# Patient Record
Sex: FEMALE | Race: WHITE | Employment: UNEMPLOYED | URBAN - METROPOLITAN AREA
[De-identification: names, ages, dates, MRNs, and addresses within clinical notes are randomized per-mention and may not be internally consistent; named-entity substitution may affect disease eponyms.]

---

## 2021-05-22 ENCOUNTER — HOSPITAL ENCOUNTER (OUTPATIENT)
Age: 41
Discharge: HOME | End: 2021-05-22
Payer: OTHER GOVERNMENT

## 2021-05-22 ENCOUNTER — APPOINTMENT (OUTPATIENT)
Dept: GENERAL RADIOLOGY | Age: 41
End: 2021-05-22
Attending: NURSE PRACTITIONER
Payer: OTHER GOVERNMENT

## 2021-05-22 ENCOUNTER — APPOINTMENT (OUTPATIENT)
Dept: URGENT CARE | Age: 41
End: 2021-05-22

## 2021-05-22 VITALS
HEIGHT: 62 IN | SYSTOLIC BLOOD PRESSURE: 110 MMHG | OXYGEN SATURATION: 95 % | WEIGHT: 175 LBS | RESPIRATION RATE: 22 BRPM | TEMPERATURE: 98 F | HEART RATE: 98 BPM | BODY MASS INDEX: 32.2 KG/M2 | DIASTOLIC BLOOD PRESSURE: 76 MMHG

## 2021-05-22 DIAGNOSIS — U07.1 COVID-19 VIRUS INFECTION: Primary | ICD-10-CM

## 2021-05-22 DIAGNOSIS — R11.0 NAUSEA: ICD-10-CM

## 2021-05-22 PROCEDURE — 81025 URINE PREGNANCY TEST: CPT | Performed by: NURSE PRACTITIONER

## 2021-05-22 PROCEDURE — 99203 OFFICE O/P NEW LOW 30 MIN: CPT

## 2021-05-22 PROCEDURE — 99453 REM MNTR PHYSIOL PARAM SETUP: CPT

## 2021-05-22 PROCEDURE — 71046 X-RAY EXAM CHEST 2 VIEWS: CPT | Performed by: NURSE PRACTITIONER

## 2021-05-22 RX ORDER — ONDANSETRON 4 MG/1
4 TABLET, ORALLY DISINTEGRATING ORAL ONCE
Status: COMPLETED | OUTPATIENT
Start: 2021-05-22 | End: 2021-05-22

## 2021-05-22 RX ORDER — ACETAMINOPHEN 160 MG/5ML
650 SOLUTION ORAL ONCE
Status: COMPLETED | OUTPATIENT
Start: 2021-05-22 | End: 2021-05-22

## 2021-05-22 RX ORDER — ONDANSETRON 4 MG/1
4 TABLET, ORALLY DISINTEGRATING ORAL EVERY 4 HOURS PRN
Qty: 10 TABLET | Refills: 0 | Status: SHIPPED | OUTPATIENT
Start: 2021-05-22

## 2021-05-22 NOTE — ED INITIAL ASSESSMENT (HPI)
GRICELDA Diaz at the bedside assessing. Patient here with complaints of a cough and head cold x 7-10 days. She states in the last 5 days she has had nausea, intermittent fevers, and some SOB.  She has had vomiting in the morning and states it is bile,

## 2021-05-24 ENCOUNTER — TELEPHONE (OUTPATIENT)
Dept: FAMILY MEDICINE CLINIC | Facility: CLINIC | Age: 41
End: 2021-05-24

## 2021-05-24 NOTE — TELEPHONE ENCOUNTER
Attempted to call [facility] for condition update, unable to leave . Phone rings then a busy signal. Will try later.

## 2021-12-04 NOTE — ED PROVIDER NOTES
Patient Seen in: [facility] [place]      History   Patient presents with:  Cough/URI    Stated Complaint: VOMITTING/SOB X 1 WK    HPI/Subjective: This is a 29-year-old female with no significant past medical history.   Presents to immediate care Presents with complaints of productive cough, fever, chest wall pain and bodt aches ongoing for the last week.   bruise/bleed easily. Positive for stated complaint: VOMITTING/SOB X 1 WK  Other systems are as noted in HPI. Constitutional and vital signs reviewed. All other systems reviewed and negative except as noted above.     Physical Exam     ED Triage tenderness, left CVA tenderness, guarding or rebound. Hernia: No hernia is present. Musculoskeletal:      Cervical back: Neck supple. Skin:     General: Skin is warm and dry. Capillary Refill: Capillary refill takes less than 2 seconds. [id number]  [phone]  In 1 week        The patient has evidence of COVID-19 pneumonia and low oxygen saturation. There is concern for gradual decline at home.  As a result, a pulse oximetry device was given to the patient/caregiver and clear instruction

## 2024-01-09 NOTE — PATIENT INSTRUCTIONS
Schedule your mammogram.    Schedule your pelvic ultrasound for follow up on the ovarian cysts.    Make an appointment with the gynecologist, Dr. Crenshaw or one of her partners, for follow up on the ovarian cysts.    Use the Proctocort foam twice daily to the hemorrhoid until symptoms improve.    Drink 64 ounces of water daily, increase fiber in your diet, use a stool softener if you develop constipation.    Recommendations for exercise are 3-5 times weekly for 30-60 minutes for a minimum of 150-300 minutes.     For premenopausal women and men, 1000 mg of calcium daily is recommended. For postmenopausal women, 1200 mg of calcium daily is recommended.    To help the body absorb and use calcium, vitamin D 2000 international units daily is recommended.    I will contact you with your test results once available.

## 2024-01-09 NOTE — PROGRESS NOTES
The 21st Century Cures Act makes medical notes like these available to patients in the interest of transparency. Please be advised this is a medical document. Medical documents are intended to carry relevant information, facts as evident, and the clinical opinion of the practitioner. The medical note is intended as peer to peer communication and may appear blunt or direct. It is written in medical language and may contain abbreviations or verbiage that are unfamiliar.     Rubia Ladd is a 43 year old female who is here for   Chief Complaint   Patient presents with    Physical       HPI:     This 43-year-old female who is a new patient to my practice presents to the office for her annual wellness exam.    She has several concerns today.    She has had issues with a hemorrhoid for the last 4 years.  She states intermittently it is bleeding and irritated.  She uses Preparation H as needed.  She is denying any constipation, abdominal pain, nausea, vomiting or diarrhea. She has never had a colonoscopy. There is no family history of colon cancer.  She has a history of ovarian cysts. Years ago, she had one rupture which required surgical intervention because she had bleeding from the fallopian tube.  She still has both her ovaries.  She states that the last time she saw a doctor for this, she was about 25 years old.  She had an episode of pelvic pain 2 years ago which she thinks was another ruptured ovarian cyst but did not seek care.  There is no family history for ovarian cancer.  She has not had a pelvic ultrasound in over 20 years.  The patient has not seen a doctor in over 20 years.  She would like to be referred to a gynecologist.  The patient had history of exercise-induced asthma as a child.  The patient states she only needed to use an inhaler before gym or exercise.  This has not been a problem for her as an adult.  The patient is declining her Tdap, flu and COVID booster.  The patient has a form she  needs completed for her insurance for her wellness exam.    Exercise: occasional.  Diet:  trying to lose weight and watches somewhat  Sleep: 8 hours     Depression/Anxiety:   PHQ-2 SCORE: 0, done 2024   If you checked off any problems, how difficult have these problems made it for you to do your work, take care of things at home, or get along with other people?: Not difficult at all  Last [place] Suicide Screening on 2024 was No Risk.       Fall Risk: No falls last 3 months  Gun in home:yes            Gun safe:yes  Glasses/contacts:Glasses             Recent eye doctor visit:2 years ago   Hearing aids:No    Family History for:  Breast ca- No  Ovarian ca- No  Uterine ca- No  Colon ca- No        FDLMP 12/15/2023  monthly, no contraception  Last pap smear: 20 years ago  Last Mammogram: Never    Colonoscopy: Never      Past Medical History:   Diagnosis Date    Asthma     History of urinary reflux     Ruptured ovarian cyst        Past Surgical History:   Procedure Laterality Date    OTHER SURGICAL HISTORY      bladder surgery for reflux    OTHER SURGICAL HISTORY      rupture ovarian cyst with bleeding from fallopian tube-still has both ovaries       History reviewed. No pertinent family history.    Social History     Socioeconomic History    Marital status:    Tobacco Use    Smoking status: Never    Smokeless tobacco: Never   Vaping Use    Vaping Use: Never used   Substance and Sexual Activity    Alcohol use: Not Currently    Drug use: Not Currently    Sexual activity: Yes     Partners: Male   Other Topics Concern     Service No    Blood Transfusions No    Caffeine Concern Yes    Occupational Exposure No    Hobby Hazards No    Sleep Concern No    Stress Concern No    Weight Concern No    Special Diet No    Back Care No    Exercise Yes    Bike Helmet No    Seat Belt Yes    Self-Exams No       Works as a shift lead at a gas station    Medications:    No current outpatient medications on  file prior to visit.     No current facility-administered medications on file prior to visit.       Allergies:    Allergies   Allergen Reactions    Morphine NAUSEA AND VOMITING       REVIEW OF SYSTEMS:     See HPI for relevant ROS  GENERAL HEALTH: no other complaints  NEURO: no other complaints  VISION: no other complaints  RESPIRATORY: no other complaints  CARDIOVASCULAR: no other complaints  GI: no other complaints  : no other complaints  SKIN: no other complaints  PSYCH: no other complaints  EXT: no other complaints        EXAM:   /68   Pulse 110   Temp 97.3 °F (36.3 °C) (Temporal)   Resp 20   Ht 5' 2\" (1.575 m)   Wt 178 lb (80.7 kg)   SpO2 98%   BMI 32.56 kg/m²     Wt Readings from Last 3 Encounters:   01/09/24 178 lb (80.7 kg)   05/22/21 175 lb (79.4 kg)       BP Readings from Last 3 Encounters:   01/09/24 118/68   05/22/21 110/76        Visual Acuity  Right Eye Visual Acuity: Corrected Right Eye Chart Acuity: 20/30   Left Eye Visual Acuity: Corrected Left Eye Chart Acuity: 20/30   Both Eyes Visual Acuity: Corrected Both Eyes Chart Acuity: 20/30   Able To Tolerate Visual Acuity: Yes      General: WH/Obese/WD, in NAD, A and O times 3  HEAD: Normocephalic, atraumatic  EYES: MITESH, EOMI, conjunctiva normal, sclera anicteric.  EARS: Tympanic membranes normal, EAC's normal.  NOSE: Turbninates normal, no bleeding noted.  PHARYNX:  No eythema or exudates, mucous membrane moist, airway patent.  NECK:  No cervical lymphadenopathy or thyromegaly, no bruits noted.  HEART: Regular rate and rhythm, no S3, S4 or murmur noted.  LUNGS: Clear to ausculation. No retractions or tachypnea noted.  BREASTS: deferred-to see gyne  ABDOMEN: Soft, obese, nontender, no guarding, rigidity or rebound, no masses or hepatosplenomegaly, normal bowel sounds in all four quadrants.  : deferred-to see gyne  Anus: Large inflamed external hemorrhoid, no active bleeding, not thrombosed.  BACK: No tenderness over the thoracic or  lumbar spine. No scoliosis noted.  EXTREMITIES: No clubbing, cyanosis, edema noted. Motor strength +5/5 in all 4 extremities. DTR's +2/4 in all 4 extremities. Able to toe and heel walk.  SKIN: Warm and dry. Multiples tattoos noted.  NEURO: Cr. N. II-XII intact, normal gait  PSYCH: Patient is anxious about her hemorrhoid.      ASSESSMENT AND PLAN:     1. Wellness examination  -We discussed the following:  Healthy diet and exercise, cancer screening, self breast exams and calcium and vitamin D supplementation.    2. Laboratory exam ordered as part of routine general medical examination  3. Screening for diabetes mellitus    - Comp Metabolic Panel (14) [E]; Future  - CBC W Differential W Platelet [E]; Future  - TSH W Reflex To Free T4 [E]; Future  - Lipid Panel [E]; Future  - Hemoglobin A1C [E]; Future  - HCV Antibody [E]; Future  - Comp Metabolic Panel (14) [E]  - CBC W Differential W Platelet [E]  - TSH W Reflex To Free T4 [E]  - Lipid Panel [E]  - Hemoglobin A1C [E]  - HCV Antibody [E]      4. Encounter for screening mammogram for malignant neoplasm of breast    - [facility] 2D+3D SCREENING BILAT (CPT=77067/89886); Future    5. History of ovarian cyst    Will check pelvic US as patient last had pelvic pain 2 years ago when she thinks another cyst ruptured. I will refer the patient to Dr. Crenshaw for further evaluation. Patient prefers her pap be done by the gynecologist.    - OBG Referral - In Network  - US PELVIS (TRANSABDOMINAL AND TRANSVAGINAL) (CPT=76856/03238); Future    6. External hemorrhoid    I discussed treatment and prevention of hemorrhoids with the patient. Will Rx Proctocort. If increasing pain or rectal bleeding, patient informed she should contact the office and I will refer her to the rectal surgeon.    - Hydrocortisone Acetate 10 % External Foam; Place 1 applicator (90 mg total) rectally in the morning and 1 applicator (90 mg total) before bedtime.  Dispense: 15 g; Refill: 1    7. Class 1 obesity due  to excess calories without serious comorbidity with body mass index (BMI) of 32.0 to 32.9 in adult    Patient declined referral to dietician. Handouts about Mediterranean diet and weight loss given to the patient.    8. Immunization declined    Patient declined flu, Tdap and COVID vaccines. I encouraged the patient to consider vaccines for the future.         Health Maintenance:    Health Maintenance   Topic Date Due    Annual Physical  1/9/2025    Mammogram  Never done    DTaP,Tdap,and Td Vaccines (1 - Tdap) 1/9/2025    Pap Smear  Never done    Influenza Vaccine (1) 01/09/2025 (Originally 10/1/2023)    COVID-19 Vaccine (1) 01/09/2025 (Originally 9/29/1980)    Annual Depression Screening  Completed    Pneumococcal Vaccine: Birth to 64yrs  Aged Out         Orders This Visit:  Orders Placed This Encounter   Procedures    Comp Metabolic Panel (14) [E]    CBC W Differential W Platelet [E]    TSH W Reflex To Free T4 [E]    Lipid Panel [E]    Hemoglobin A1C [E]    HCV Antibody [E]       Meds This Visit:  Requested Prescriptions     Signed Prescriptions Disp Refills    Hydrocortisone Acetate 10 % External Foam 15 g 1     Sig: Place 1 applicator (90 mg total) rectally in the morning and 1 applicator (90 mg total) before bedtime.       Imaging & Referrals:  OBG - INTERNAL  [facility] 2D+3D SCREENING BILAT (CPT=77067/26678)  US PELVIS (TRANSABDOMINAL AND TRANSVAGINAL) (CPT=76856/38712)       The patient indicates understanding of these issues and agrees to the plan.  The patient is asked to return pending lab results.  Christina Loredo DO    Total time: 60 minutes  including precharting, H&P, plan of care, form completed for insurance.    This dictation was performed with a verbal recognition program (DRAGON) and it was checked for errors. It is possible that there are still dictated errors within this office note. If so, please bring any errors to my attention for an addendum. All efforts were made to ensure that this office  note is accurate

## 2024-03-07 NOTE — ED QUICK NOTES
Finger spint applied as ordered by Dr. Gillette, held in place with coban, cms intact, pt tolerating well

## 2024-03-07 NOTE — ED PROVIDER NOTES
Patient Seen in: [facility] [place]      History     Chief Complaint   Patient presents with    Arm or Hand Injury     Stated Complaint: lt pinky injury    Subjective:   HPI    43-year-old female who presents to the immediate care complaining of having injured her left fifth digit.  The patient says she was reaching for roll of toilet paper but it was going to fall into her toilet.  As she reached out to grab the falling object she banged the left fifth digit against an object.  She noticed that her distal phalanx has been held in a flexed position ever since the injury occurred.  She can no longer extend the distal phalanx.  No numbness or tingling.  No open wounds.  Reviewing her record she was last seen for an office visit for a wellness examination on 1/9/2024.    Objective:   Past Medical History:   Diagnosis Date    Asthma (HCC)     exercise induced as a child-no issues as an adult    History of urinary reflux     Ruptured ovarian cyst               Past Surgical History:   Procedure Laterality Date    OTHER SURGICAL HISTORY      bladder surgery for reflux    OTHER SURGICAL HISTORY      rupture ovarian cyst with bleeding from fallopian tube-still has both ovaries                Social History     Socioeconomic History    Marital status:    Tobacco Use    Smoking status: Never    Smokeless tobacco: Never   Vaping Use    Vaping Use: Never used   Substance and Sexual Activity    Alcohol use: Not Currently    Drug use: Not Currently    Sexual activity: Yes     Partners: Male   Other Topics Concern     Service No    Blood Transfusions No    Caffeine Concern Yes    Occupational Exposure No    Hobby Hazards No    Sleep Concern No    Stress Concern No    Weight Concern No    Special Diet No    Back Care No    Exercise Yes    Bike Helmet No    Seat Belt Yes    Self-Exams No              Review of Systems    Positive for stated complaint: lt pinky injury  Other systems are as noted in  HPI.  Constitutional and vital signs reviewed.      All other systems reviewed and negative except as noted above.    Physical Exam     ED Triage Vitals [03/07/24 1504]   BP (!) 173/105   Pulse 94   Resp 16   Temp 98.1 °F (36.7 °C)   Temp src Temporal   SpO2 99 %   O2 Device None (Room air)       Current:BP (!) 175/75   Pulse 89   Temp 98.1 °F (36.7 °C) (Temporal)   Resp 16   Ht 160 cm (5' 3\")   Wt 81.6 kg   LMP 03/01/2024   SpO2 99%   BMI 31.89 kg/m²         Physical Exam  General: Nontoxic 43-year-old female in no distress.  She is right-hand dominant.  Focused exam of the patient's left upper extremity: Left fifth digit shows that the distal phalanx is held in a flexed position.  She is unable to extend actively at the distal phalanx.  There is no open wounds.  There are some erythema along the middle phalanx and distal phalanx.  Can passively extend the distal phalanx without discomfort.  Capillary refills less than 2 seconds.  She is able to flex and extend fully at the MCP.       ED Course   Labs Reviewed - No data to display  PROCEDURE:  XR FINGER(S) (MIN 2 VIEWS), LEFT 5TH (CPT=73140)     INDICATIONS:  pain/injury     COMPARISON:  None.     TECHNIQUE:  Three views of the finger were obtained.     PATIENT STATED HISTORY: (As transcribed by Technologist)  Patient states she hit her finger on a hard surface this morning. She has pain in her distal fifth digit.         FINDINGS:  There is no fracture, subluxation or dislocation in the left 5th finger.  There is flexion noted at the DIP joint on the lateral view.  Correlate with physical exam.  There is a well corticated ossific density seen in the soft tissues lateral  to the distal epiphysis of the proximal phalanx of the middle finger.  This could reflect an old chip or avulsion fracture or ligamentous calcification.                   Impression  CONCLUSION:  No acute fracture in the left 5th digit.  Please see above.        LOCATION:  [facility]         Dictated by (CST): Kulwant Morejon MD on 3/07/2024 at 4:19 PM      Finalized by (CST): Kulwant Morejon MD on 3/07/2024 at 4:20 PM                   MDM    Differential diagnose includes was not limited to mallet finger, fracture, dislocation.  Clinically the patient appears to have a mallet finger.  X-rays were performed to assess for fracture or dislocation.  She will be placed in a splint with the DIP held in slight extension.  I personally reviewed the radiographs my individual interpretation shows no fracture but there does appear to be mallet finger slight swan-neck deformity.  I also reviewed the official report  No acute fracture left fifth digit.  The patient will be placed in a finger splint.  CMS intact splint position normal.  Follow-up with orthopedics as an outpatient.    Note to Patient  The 21st Century Cures Act makes medical notes like these available to patients in the interest of transparency. However, be advised this is a medical document and is intended as [id number]-ektf communication; it is written in medical language and may appear blunt, direct, or contain abbreviations or verbiage that are unfamiliar. Medical documents are intended to carry relevant information, facts as evident, and the clinical opinion of the practitioner.  Patient was evaluated and a screening exam was performed.   As a treating physician attending to the patient, I determined, within reasonable clinical confidence and prior to discharge, that an emergency medical condition was not or was no longer present.  There was no indication for further evaluation, treatment or admission on an emergency basis.  Comprehensive verbal and written discharge and follow-up instructions were provided to help prevent relapse or worsening.  Patient was instructed to follow-up with their primary care provider for further evaluation and treatment, but to return immediately to the ER for worsening, concerning, new, changing or persisting  symptoms.  I discussed the case with the patient and they had no questions, complaints, or concerns.  Patient felt comfortable going home.  ^^Please note that this report has been produced using speech recognition software and may contain errors related to that system including, but not limited to, errors in grammar, punctuation, and spelling, as well as words and phrases that possibly may have been recognized inappropriately.  If there are any questions or concerns, contact the dictating provider for clarification.                   Medical Decision Making      Disposition and Plan     Clinical Impression:  1. Mallet finger of left hand         Disposition:  Discharge  3/7/2024  4:31 pm    Follow-up:  Christina Loredo DO  [address] [facility] [address]  [facility] [address] [phone]    Schedule an appointment as soon as possible for a visit in 1 week      Gerry Wolf MD  [address] [facility] [address]  [facility] [id number] [phone]    Schedule an appointment as soon as possible for a visit in 1 week            Medications Prescribed:  Discharge Medication List as of 3/7/2024  4:40 PM

## 2024-03-19 NOTE — PATIENT INSTRUCTIONS
Make an appointment with ortho, Dr. Otero, the hand specialist, for follow up on the mallet finger.    Continue to wear the splint 24 /7.    Keep your appointment with Dr. Olvera as scheduled on 4/16/2024.    Make an appointment for your mammogram.    Make an appointment with your gynecologist.

## 2024-03-19 NOTE — PROGRESS NOTES
The  [employer] Act makes medical notes like these available to patients in the interest of transparency. Please be advised this is a medical document. Medical documents are intended to carry relevant information, facts as evident, and the clinical opinion of the practitioner. The medical note is intended as peer to peer communication and may appear blunt or direct. It is written in medical language and may contain abbreviations or verbiage that are unfamiliar.       Rubia Ladd is a 43 year old female.    HPI:     Chief Complaint   Patient presents with    ER F/U       This 23-year-old female who is right-hand dominant presents the office for follow-up and immediate care visit dated 3/7/2024.  The patient had injured her left fifth finger and was diagnosed with a mallet deformity.  She has been wearing her splint since it has been placed.  She is not taking it off.  She has not made a follow-up appointment with orthopedic physician.  The patient states that it was significantly drooping and she was afraid to take the splint off.  She has not had any new symptoms.    HISTORY:  Past Medical History:   Diagnosis Date    Asthma (HCC)     exercise induced as a child-no issues as an adult    History of urinary reflux     Ruptured ovarian cyst       Past Surgical History:   Procedure Laterality Date      8/3/99    OTHER SURGICAL HISTORY      bladder surgery for reflux    OTHER SURGICAL HISTORY      rupture ovarian cyst with bleeding from fallopian tube-still has both ovaries      History reviewed. No pertinent family history.   Social History:   Social History     Socioeconomic History    Marital status:    Tobacco Use    Smoking status: Never    Smokeless tobacco: Never   Vaping Use    Vaping Use: Never used   Substance and Sexual Activity    Alcohol use: Never    Drug use: Never    Sexual activity: Yes     Partners: Male   Other Topics Concern     Service No    Blood Transfusions No     Caffeine Concern No    Occupational Exposure No    Hobby Hazards No    Sleep Concern No    Stress Concern No    Weight Concern Yes    Special Diet No    Back Care No    Exercise No    Bike Helmet No    Seat Belt No    Self-Exams No        Medications (Active prior to today's visit):  No current outpatient medications on file.       Allergies:  Allergies   Allergen Reactions    Morphine NAUSEA AND VOMITING       ROS:     Pertinent positives and pertinent negatives are as listed in HPI.    All other review of symptoms were reviewed and negative.    PHYSICAL EXAM:   /84 (BP Location: Left arm, Patient Position: Sitting, Cuff Size: adult)   Pulse 78   Temp 98 °F (36.7 °C)   Resp 18   Ht 5' 3\" (1.6 m)   Wt 184 lb (83.5 kg)   LMP 03/01/2024 (Exact Date)   SpO2 99%   BMI 32.59 kg/m²     Wt Readings from Last 3 Encounters:   03/19/24 184 lb (83.5 kg)   03/07/24 180 lb (81.6 kg)   01/09/24 178 lb (80.7 kg)       BP Readings from Last 3 Encounters:   03/19/24 128/84   03/07/24 (!) 175/75   01/09/24 118/68       General: Obese, well hydrated. No acute distress. No pallor.   HEENT: Normocephalic, atraumatic.  Sclera clear and non icteric bilaterally.   Extremities: No edema bilaterally.  The left hand is examined.  The left fifth finger is splinted.  When the splint is removed, the patient is noted to have drooping of the distal phalanx of the left fifth finger.  She has some discomfort at the DIP joint.  She also has some stiffness at the PIP joint but was able to fully flex the joint by the end of the visit.  There is no tenderness over the MCP.  CMS is intact to the left hand.  Skin: Warm and dry.  Neuro: Alert and oriented x 3,normal gait.  Psych: Normal mood and affect.     ASSESSMENT/PLAN:   43 year old female with    XR FINGER(S) (MIN 2 VIEWS), LEFT 5TH (CPT=73140)    Result Date: 3/7/2024  PROCEDURE:  XR FINGER(S) (MIN 2 VIEWS), LEFT 5TH (CPT=73140)  INDICATIONS:  pain/injury  COMPARISON:  None.   TECHNIQUE:  Three views of the finger were obtained.  PATIENT STATED HISTORY: (As transcribed by Technologist)  Patient states she hit her finger on a hard surface this morning. She has pain in her distal fifth digit.    FINDINGS:  There is no fracture, subluxation or dislocation in the left 5th finger.  There is flexion noted at the DIP joint on the lateral view.  Correlate with physical exam.  There is a well corticated ossific density seen in the soft tissues lateral to the distal epiphysis of the proximal phalanx of the middle finger.  This could reflect an old chip or avulsion fracture or ligamentous calcification.             CONCLUSION:  No acute fracture in the left 5th digit.  Please see above.   LOCATION:  [facility]   Dictated by (CST): Kulwant Morejon MD on 3/07/2024 at 4:19 PM     Finalized by (CST): Kulwant Morejon MD on 3/07/2024 at 4:20 PM               1. Mallet finger of left hand    The finger was again splinted in extension.  The patient is referred to Dr. Otero, the hand surgeon for further evaluation of her mallet deformity.  The patient was advised that even with splinting, sometimes a mallet deformity does not heal completely.    - Ortho Referral - In Network    2.  External hemorrhoid    The patient has an appointment with the general surgeon, Dr. Olvera 4/16/2024.     3.  Health maintenance    The patient was reminded to schedule her mammogram and her appointment with her gynecologist for her Pap smear.    Health Maintenance:    Health Maintenance   Topic Date Due    Mammogram  Never done    Pap Smear  Never done    Influenza Vaccine (1) 01/09/2025 (Originally 10/1/2023)    DTaP,Tdap,and Td Vaccines (1 - Tdap) 01/09/2025 (Originally 3/29/2001)    COVID-19 Vaccine (1 - 2023-24 season) 01/09/2025 (Originally 9/1/2023)    Annual Physical  01/09/2025    Annual Depression Screening  Completed    Pneumococcal Vaccine: Birth to 64yrs  Aged Out         Meds This Visit:  Requested Prescriptions       No prescriptions requested or ordered in this encounter       Imaging & Referrals:  ORTHOPEDIC - INTERNAL     Patient understands plan and follow-up.  FU with Dr. Otero, hand surgeon.    3/19/2024  Christina Loredo DO    Total time: 20 minutes including precharting, H&P, plan of care    This dictation was performed with a verbal recognition program (DRAGON) and it was checked for errors. It is possible that there are still dictated errors within this office note. If so, please bring any errors to my attention for an addendum. All efforts were made to ensure that this office note is accurate

## 2024-03-20 NOTE — TELEPHONE ENCOUNTER
Do you need new imaging for appt  3/28/24? If so with or without splint.  Please advise.  Thank you!      DOI 3/7/24 Mallet finger of left hand 5th finger    XR 3/7/24  FINDINGS:  There is no fracture, subluxation or dislocation in the left 5th finger.  There is flexion noted at the DIP joint on the lateral view.  Correlate with physical exam.  There is a well corticated ossific density seen in the soft tissues lateral  to the distal epiphysis of the proximal phalanx of the middle finger.  This could reflect an old chip or avulsion fracture or ligamentous calcification.       Impression   CONCLUSION:  No acute fracture in the left 5th digit.  Please see above

## 2024-03-20 NOTE — TELEPHONE ENCOUNTER
Patient called for left hand pinky mallet finger. Xrays in epic. Please advise if additional needed   Future Appointments   Date Time Provider Department Center   3/28/2024  3:30 PM [id number]   4/16/2024  9:00 AM Lars Olvera MD [id number]

## 2024-03-28 NOTE — PROGRESS NOTES
ORTHOSIS EVALUATION:     Diagnosis:      mallet finger LSF   Referring Provider: Chalra  Date of Service/orthosis Evaluation:    3/28/24    Precautions:   per protocol Next MD visit:   TBS  DOI: 3/7/24  Date of Surgery: n/a   Orders:   Order Date:  3/28/2024  Authorizing Provider:   ANNAMARIA DOMINGUEZ     Procedure:  OP REFERRAL TO [facility] [[id number]]         Order #:   [id number]  Qty:  1     Priority:  Routine                   Class:   IHP - RFL     Standing Interval:          Standing Occurrences:          Expires on:            Expected by:    Associated DX:  Mallet deformity of left little finger (M20.012)     Order summary:  IHP - RFL, Routine, 8 visits  Occupational  Therapy Area of Concentration: Orthopedic  Occupational Therapy Ortho: Splint         Comments:  Recommend extension splinting of DIP joint for 6-8 weeks for 24 hours daily, please fashion a custom splint for the patient if possible. If appropriate at about week 6 recommend an occupational therapy protocol for the patient with primary focus on the protection and gradual mobilization of the affected digit. Initially, fabricate or fit a custom dorsal finger splint ensuring the distal interphalangeal joint remains in slight hyperextension while allowing full flexion of the proximal interphalangeal joint. Instruct the patient to wear the splint continuously for 6-8 weeks, even during bathing, to maintain DIP joint stability. Educate the patient on gentle range of motion exercises for the PIP joint and other unaffected fingers to prevent stiffness. After the splinting period, gradually introduce DIP joint flexion and extension exercises, emphasizing controlled, pain-free movements. Incorporate dexterity and fine motor skills activities, like picking up small objects or buttoning, to enhance functional use of the finger. Stress the importance of adhering to the splinting regimen and exercise program, and schedule regular follow-ups  to monitor progress and adjust the treatment plan as needed.    PATIENT SUMMARY   Rubia Ladd is a 43 year old female who presents to therapy today with complaints of LSF tip drooping  HPI: She reaching for roll of toilet paper but it was going to fall into her toilet.  As she reached out to grab the falling object she banged the left fifth digit against an object.  She noticed that her distal phalanx has been held in a flexed position ever since the injury occurred.   DOI: 3/7/24?    Pt describes pain level: current 0/10, best 0/10, worst 0/10.  Current functional limitations include gripping.   Hand dominance: Right.    Rubia describes prior level of function independent.   Past medical history was reviewed with Rubia . Significant findings include none      OBJECTIVE   Observation: mild edema in LSF DIP    Extremity: left    Today's Treatment:   Orthosis Fabricated: 2 FO mallet finger orthoses  *Wearing Schedule/Instructions:   Wearing   --at all times (including night), but off for sink side hygiene.    *Straps should be secure but not tight.  *When removing orthosis, remove one side of each straps.  *Clean with cool water and mild soap on cloth; wipe soap from splint and let it air dry.  *Keep away from heat source: sunlight, oven/stove, grill, fire pit (even hot car in the warm weather).  *Keep away from dogs, as it is like rawhide to them.  *Do not allow children to play with splint.    Patient instructed on Precautions as follows:  *Do not add, subtract, or modify/adjust orthosis  *If you notice any redness or pressure areas when you remove the splint, contact your therapist and he/she will make necessary adjustments.  *If you develop any numbness, discoloration and/or temperature changes in your hand, you may have applied the straps too tight. If adjusting the straps does not help, contact your therapist.   Purpose of Orthosis: Immobilization          ASSESSMENT  Rubia presents to  occupational therapy evaluation with primary c/o LSF. Patient and OT discussed evaluation findings.  She does have hypermobility in the fingers, so will need to monitor for lateral band slippage. Patient reports orthosis fits comfortably, and reports understanding for wearing schedule. Skilled Occupational Therapy is medically necessary to address the above impairments and reach functional goals.        PLAN OF CARE:    Goals: (to be met in 2 visits)  Pt will demonstrate independence with don/doff orthosis.  Pt will verbalize understanding of orthosis precautions and instructions for its use/wear/care.      Frequency / Duration: return prn    Education or treatment limitation: None  Rehab Potential:good    Patient/Family/Caregiver was advised of these findings, precautions, and treatment options and has agreed to actively participate in planning and for this course of care.      Charges: Orthotic Mgnt and Train x 2  Total Timed Treatment: 30 minutes     Total Treatment Time: 30 minutes    Libertad Cantrell OTR/L NITESHS CHT  Physician's certification required: Yes  I certify the need for these services furnished under this plan of treatment and while under my care. (Electronic signature)    X___________________________________________________ Date____________________    Certification From: 3/28/2024  To:6/26/2024

## 2024-04-01 NOTE — H&P
Sports Medicine Clinic Note     Subjective:    Chief Complaint   Patient presents with    Hand Injury     Lt pinky derik finger doi: 03.07  - pt was going to catch a roll of toilet paper and smashed her finger on the toilet seat  - no previous injury  - dominant hand: rt hand      HPI: Rubia Ladd is a 44 year old RHD female who presents with an injury to the left little finger sustained on 3/7/24. Following this, the patient sought care at , where x-rays were performed showing no bony derangement. Since the injury the patient has been unable to extend the distal interphalangeal joint of the finger, and she has noted the joint to fall into a flexed resting posture. This has caused functional deficit. No prior history of similar injuries. Denies numbness or tingling in the finger.    Objective:    General: Well-nourished, no acute distress, conversant and oriented    Left Hand:    Skin: Mild swelling to the affected distal interphalangeal joint. Surrounding skin is normal in appearance and texture, consistent with age, with no other significant lesions or abnormalities.   Vascular: All fingertips are pink with good capillary refill and of normal temperature. No edema or atrophy. No areas of ischemia or ulcers.  Range of Motion: All fingers demonstrated a full, free and uninhibited range of motion with the exception of the little finger, which lacks active extension at the DIP joint despite preserved extension.   Palpatory: There was focal tenderness to the affected distal interphalangeal joint. No additional areas of pain or discomfort identified  Sensory Exam: Light touch was intact in both forearms and hands.  Motor Exam: There has been loss of active extension at the little finger DIP joint. Active flexion at the DIP joint is preserved. All musculoskeletal units were functioning independently at a 5/5 strength.  Other Provocative Tests and Abnormal Findings: None    Imaging:   Radiographs of the  affected finger were obtained. Personally reviewed in the office and showed no evidence of fracture. Normal joint alignment. Soft tissue swelling noted around the DIP joint.    Assessment:    Mallet injury to the left little finger    Plan:    The patient has been counseled that this is an avulsion of the terminal extensor tendon. Based on radiographic and clinical exam this best managed conservatively.    She understands the treatment to involves:    Immobilization: A mallet finger splint to be applied, keeping the DIP joint in slight hyperextension. Patient instructed to wear the splint continuously for 6-8 weeks. With plan to likely transition at that time to intermittent active range of motion 6x per day with resting extension splinting from weeks 6-10, followed by nightly splinting only from weeks 10-14.  Medications: Pain relief with acetaminophen or NSAIDs as needed for pain.  Activity Recommendations: Advise against using the affected finger for gripping or heavy lifting. Gentle ROM exercises for other joints of the finger encouraged.  Patient Education: Provided education on the importance of continuous splint wear and avoiding wetting the splint. Discussed potential complications if not properly treated, including permanent deformity and loss of function.    Follow-Up: Schedule a follow-up visit in 2-4 weeks to assess the response to treatment and splint adjustment if necessary. Further follow-up every 2-4 weeks to monitor healing.    Santi Dunham DO, AVRILM   Primary Care Sports Medicine    Department of Orthopaedic Surgery  [address] [address]   [address] [place] [id number]    t: [phone]  f: [phone]      [email or web address]

## 2024-04-16 NOTE — H&P
New Patient Visit Note       Active Problems      1. Rectal mass    2. Anal condyloma        Chief Complaint   Chief Complaint   Patient presents with    New Patient     NP- External Hemorrhoids- pt denies rectal pain, reports bleeding        History of Present Illness   This is a very nice 44-year-old female who presents to me with concern for a \"hemorrhoid\".  She has noticed a mass of tissue outside of her anus for the last 5 years.  She calls it a \"super huge hemorrhoid\".  She has a small amount of bleeding with bowel movements and cleaning at times.  It does not cause her any pain.  She does see some mucus discharge at times.  She states it is always outside of her anus and pops right back out if she tries to reduce the tissue.  She denies any itching.  She has had 1 vaginal delivery with episiotomy.  She denies any fecal incontinence.  No prior anorectal surgery.  No blood thinners.  No prior colonoscopy.  No family history of colon or rectal cancer.  She has not sought medical care for many years due to lack of health insurance until recently.  No known history of sexually transmitted infections.  No history of chronic constipation and she occasionally takes fiber Gummies.      Allergies  Rubia is allergic to morphine.    Past Medical / Surgical / Social / Family History    The past medical and past surgical history have been reviewed by me today.    Past Medical History:    Asthma (HCC)    exercise induced as a child-no issues as an adult    History of urinary reflux    Ruptured ovarian cyst     Past Surgical History:   Procedure Laterality Date      8/3/99    Other surgical history      bladder surgery for reflux    Other surgical history      rupture ovarian cyst with bleeding from fallopian tube-still has both ovaries       The family history and social history have been reviewed by me today.    History reviewed. No pertinent family history.  Social History     Socioeconomic History    Marital  status:    Tobacco Use    Smoking status: Never    Smokeless tobacco: Never   Vaping Use    Vaping status: Never Used   Substance and Sexual Activity    Alcohol use: Never    Drug use: Never    Sexual activity: Yes     Partners: Male   Other Topics Concern     Service No    Blood Transfusions No    Caffeine Concern No    Occupational Exposure No    Hobby Hazards No    Sleep Concern No    Stress Concern No    Weight Concern Yes    Special Diet No    Back Care No    Exercise No    Bike Helmet No    Seat Belt No    Self-Exams No        Current Outpatient Medications:     diclofenac 75 MG Oral Tab EC, Take 1 tablet (75 mg total) by mouth 2 (two) times daily., Disp: 28 tablet, Rfl: 1      Review of Systems  A 10 point review of systems was performed and negative unless otherwise documented per HPI.    Physical Findings   Pulse 81   Temp 97.1 °F (36.2 °C) (Temporal)   LMP 03/01/2024 (Exact Date)   Physical Exam  Vitals and nursing note reviewed. Exam conducted with a chaperone present.   Constitutional:       General: She is not in acute distress.  HENT:      Head: Normocephalic and atraumatic.      Mouth/Throat:      Mouth: Mucous membranes are moist.   Cardiovascular:      Rate and Rhythm: Normal rate and regular rhythm.   Pulmonary:      Effort: Pulmonary effort is normal.   Abdominal:      General: There is no distension.      Palpations: Abdomen is soft.      Tenderness: There is no abdominal tenderness.   Genitourinary:     Comments: Patient examined in the prone jackknife position with female medical assistant chaperone present.  External exam of the anus reveals a 1-2 cm prolapsing condylomatous mass in the right anterior anal canal.  This mass is semipedunculated, friable friable and bleeds on contact.  There is a small approximately 5 mm condylomatous lesion just outside the posterior vagina and the perineum.  Digital rectal examination reveals fair tone without any more proximal palpable masses  or lesions.  Anoscopy reveals the presence of the semipedunculated condylomatous lesion in the distal anal canal without any other obvious more proximal masses or lesions.  Musculoskeletal:         General: No deformity.   Skin:     General: Skin is warm and dry.   Neurological:      General: No focal deficit present.      Mental Status: She is alert.   Psychiatric:         Mood and Affect: Mood normal.             Assessment   1. Rectal mass    2. Anal condyloma        This is a very nice 44-year-old female who presents to me with concern for a \"hemorrhoid\".  She has noticed a mass of tissue outside of her anus for the last 5 years.  She calls it a \"super huge hemorrhoid\".  She has a small amount of bleeding with bowel movements and cleaning at times.  It does not cause her any pain.  She does see some mucus discharge at times.  She states it is always outside of her anus and pops right back out if she tries to reduce the tissue.  She denies any itching.  She has had 1 vaginal delivery with episiotomy.  She denies any fecal incontinence.  No prior anorectal surgery.  No blood thinners.  No prior colonoscopy.  No family history of colon or rectal cancer.  She has not sought medical care for many years due to lack of health insurance until recently.  No known history of sexually transmitted infections.  No history of chronic constipation and she occasionally takes fiber Gummies.    External exam of the anus reveals a 1-2 cm prolapsing condylomatous mass in the right anterior anal canal.  This mass is semipedunculated, friable friable and bleeds on contact.  There is a small approximately 5 mm condylomatous lesion just outside the posterior vagina and the perineum.  Digital rectal examination reveals fair tone without any more proximal palpable masses or lesions.  Anoscopy reveals the presence of the semipedunculated condylomatous lesion in the distal anal canal without any other obvious more proximal masses or  lesions.    Plan  I counseled patient that I suspect she has a large, prolapsing anal condyloma and at least 1 additional small perineal condyloma.  It is also possible she could have underlying anal dysplasia or even cancer though I think this is less likely.  I do recommend plan to go to the operating room for anal exam under anesthesia with anoscopy and excision of the rectal mass in addition to excision and fulguration of any additional perianal condyloma.  The details of this procedure were discussed including the expected recovery time, risks, benefits and alternatives.  Specifically, I counseled patient that condyloma is caused by human papilloma virus (HPV).  Unfortunately, surgery does not cure HPV.  Therefore, she is at risk for recurrent condyloma or even anal dysplasia.  She may require further surveillance procedures or work-up depending on pathology of the rectal mass.  I do recommend she establishes care with a gynecologist for vaginal and cervical examination.  I also advised her to use barrier protection with intercourse.      Patient expressed understanding and was agreeable to schedule surgery with me.  This has been scheduled for 5/24/2024.    After the visit, I discussed her case with her PCP, Dr. Loredo.  I do think it would be prudent to rule out HIV.  I discussed my recommendation with the patient.  She was agreeable to HIV testing.  I placed the order and asked her to go to any [facility] lab facility for this testing.      No orders of the defined types were placed in this encounter.      Imaging & Referrals   None    Follow Up  No follow-ups on file.    Lars Olvera MD

## 2024-04-25 NOTE — PROGRESS NOTES
Sports Medicine Clinic Note     Subjective:    Chief Complaint   Patient presents with    Follow - Up     Lt 5th finger pain   - no new or worsening symptoms  - pain has improved when bending but still hurts if she bends it all the way      Interval History: uRbia Ladd is a 44 year old RHD female who presents for follow up mallet injury to the left little finger sustained on 3/7/24. Patient reports compliance with her custom made splint.  She is hyperflexible and has not been changing the tape daily, notices that if she waits too long to change the tape that her DIP joint will slide into flexion slightly but otherwise she is happy with her progress. She is not in any pain but reports swelling mildly persists but is less prominent than performed. Denies numbness or tingling in the finger.    Objective:    General: Well-nourished, no acute distress, conversant and oriented    Left Hand:    Skin: Improved swelling to the affected distal interphalangeal joint. Surrounding skin is normal in appearance and texture, consistent with age, with no other significant lesions or abnormalities.   Vascular: All fingertips are pink with good capillary refill and of normal temperature. No areas of ischemia or ulcers.  Range of Motion: Deferred due to nature of injury.  Sensory Exam: Light touch intact in both hands.    Imaging:     No new imaging.    Previous radiographs of the affected finger showed no evidence of fracture. Normal joint alignment. Soft tissue swelling noted around the DIP joint.    Assessment:    Mallet injury to the left little finger    Plan:    Immobilization: A mallet finger splint to be continued keeping the DIP joint in slight hyperextension. Patient instructed to wear the splint continuously for another 2-4 weeks. With plan to likely transition at that time to intermittent active range of motion 6x per day with resting extension splinting from weeks 6-10, followed by nightly splinting only from  weeks 10-14.  Medications: Pain relief with acetaminophen or NSAIDs as needed for pain.  Activity Recommendations: Advise against using the affected finger for gripping or heavy lifting. Gentle ROM exercises for other joints of the finger encouraged.  Patient Education: Provided education on the importance of continuous splint wear and avoiding wetting the splint. Discussed potential complications if not properly treated, including permanent deformity and loss of function.    Follow-Up: Schedule a follow-up visit in 2-4 weeks to assess the response to treatment and splint adjustment if necessary. Further follow-up every 2-4 weeks to monitor healing.    Santi Dunham DO, AVRILM   Primary Care Sports Medicine    Department of Orthopaedic Surgery  [address] [address]   [address] [place] [address]    t: [phone]  f: [phone]      [facility]

## 2024-05-03 NOTE — IMAGING NOTE
This Breast Care RN assisted Dr. Morejon with recommendation for a bilateral breast (1 site right 1 site left) ultrasound guided biopsy for masses. Procedure reviewed and all questions answered. Emotional and educational support given.   On the day of the biopsy, pt instructed to take Tylenol 1000mg PO, eat a light meal & bring or wear a sports bra. Post biopsy care also reviewed with pt to include NO lifting more than 5lbs, no exercising or housework (limit upper body movement) for 24-48 hrs post biopsy. Patient denies blood thinners, bleeding disorders, liver disease and chemo. Pt verbalized understanding. Our breast center schedulers will be calling to schedule an apt that is convenient for pt.

## 2024-05-08 NOTE — TELEPHONE ENCOUNTER
DWIGHT MONTERO Patient  Member ID  [id number]    Date of Birth  1980-03-29    Gender  Female    Relationship to Subscriber  Spouse    Subscriber Name  VON MONTERO    Transaction Type  Outpatient Authorization    Organization  [facility]    Payer  [facility] logo     Certificate Information  Reference Number  [id number]    Status  NO ACTION REQUIRED    Message  Requested Service does not require preauthorization. We would strongly encourage you to check benefits for this service.    Member Information  Patient Name  DWIGHT MONTERO    Patient Date of Birth  1980-03-29    Patient Gender  Female    Member ID  [id number]    Relationship to Subscriber  Spouse    Subscriber Name  VON MONTERO    Requesting Provider     Name  IRENE CAMACHO    NPI  [id number]    Tax Id  [id number]    Specialty  [id number]  Provider Role  Provider    Address  [address] [place] [address]    Phone  [phone]  Fax  [phone]    Contact Name  GARY RAMSAY    Service Information  Service Type  2 - Surgical    Place of Service  22 - On [facility]    Service From - To Date  2024-05-24 - 2024-08-28    Level of Service  Elective    Diagnosis Code 1   - Other specified diseases of anus and rectum    Diagnosis Code 2  A630 - Anogenital (venereal) warts    Procedure Code 1 (CPT/HCPCS)  67680 - SURG DX EXAM ANORECTAL    Quantity  1 Units    Status  NO ACTION REQUIRED    Procedure Code 2 (CPT/HCPCS)  36110 - EXC RECT MARIA ISABEL TRANSANAL PART    Quantity  1 Units    Status  NO ACTION REQUIRED    Procedure Code 3 (CPT/HCPCS)  06649 - EXC RECT MARIA ISABEL TRANSANAL FULL    Quantity  1 Units    Status  NO ACTION REQUIRED    Procedure Code 4 (CPT/HCPCS)  48870 - EXCISION OF ANAL LESION(S)    Quantity  1 Units    Status  NO ACTION REQUIRED

## 2024-05-24 NOTE — H&P
New Patient Visit Note       Active Problems      No diagnosis found.      Chief Complaint   No chief complaint on file.      History of Present Illness   This is a very nice 44-year-old female who presents to me with concern for a \"hemorrhoid\".  She has noticed a mass of tissue outside of her anus for the last 5 years.  She calls it a \"super huge hemorrhoid\".  She has a small amount of bleeding with bowel movements and cleaning at times.  It does not cause her any pain.  She does see some mucus discharge at times.  She states it is always outside of her anus and pops right back out if she tries to reduce the tissue.  She denies any itching.  She has had 1 vaginal delivery with episiotomy.  She denies any fecal incontinence.  No prior anorectal surgery.  No blood thinners.  No prior colonoscopy.  No family history of colon or rectal cancer.  She has not sought medical care for many years due to lack of health insurance until recently.  No known history of sexually transmitted infections.  No history of chronic constipation and she occasionally takes fiber Gummies.      Allergies  Rubia is allergic to morphine.    Past Medical / Surgical / Social / Family History    The past medical and past surgical history have been reviewed by me today.    Past Medical History:    Asthma (HCC)    exercise induced as a child-no issues as an adult    History of urinary reflux    Problems with swallowing    can't swallow pills    Ruptured ovarian cyst    Visual impairment     Past Surgical History:   Procedure Laterality Date      8/3/99    Other surgical history      bladder surgery for reflux    Other surgical history      rupture ovarian cyst with bleeding from fallopian tube-still has both ovaries       The family history and social history have been reviewed by me today.    History reviewed. No pertinent family history.  Social History     Socioeconomic History    Marital status:    Tobacco Use    Smoking status:  Never    Smokeless tobacco: Never   Vaping Use    Vaping status: Never Used   Substance and Sexual Activity    Alcohol use: Never    Drug use: Never    Sexual activity: Yes     Partners: Male   Other Topics Concern     Service No    Blood Transfusions No    Caffeine Concern No    Occupational Exposure No    Hobby Hazards No    Sleep Concern No    Stress Concern No    Weight Concern Yes    Special Diet No    Back Care No    Exercise No    Bike Helmet No    Seat Belt No    Self-Exams No      No current outpatient medications on file.      Review of Systems  A 10 point review of systems was performed and negative unless otherwise documented per HPI.    Physical Findings   BP (!) 171/93 (BP Location: Right arm)   Pulse 86   Temp 98.4 °F (36.9 °C) (Temporal)   Resp 18   Ht 63\"   Wt 190 lb 1.6 oz (86.2 kg)   LMP 04/20/2024 (Exact Date)   SpO2 99%   BMI 33.67 kg/m²   Physical Exam  Vitals and nursing note reviewed. Exam conducted with a chaperone present.   Constitutional:       General: She is not in acute distress.  HENT:      Head: Normocephalic and atraumatic.      Mouth/Throat:      Mouth: Mucous membranes are moist.   Cardiovascular:      Rate and Rhythm: Normal rate and regular rhythm.   Pulmonary:      Effort: Pulmonary effort is normal.   Abdominal:      General: There is no distension.      Palpations: Abdomen is soft.      Tenderness: There is no abdominal tenderness.   Genitourinary:     Comments: Patient examined in the prone jackknife position with female medical assistant chaperone present.  External exam of the anus reveals a 1-2 cm prolapsing condylomatous mass in the right anterior anal canal.  This mass is semipedunculated, friable friable and bleeds on contact.  There is a small approximately 5 mm condylomatous lesion just outside the posterior vagina and the perineum.  Digital rectal examination reveals fair tone without any more proximal palpable masses or lesions.  Anoscopy reveals  the presence of the semipedunculated condylomatous lesion in the distal anal canal without any other obvious more proximal masses or lesions.  Musculoskeletal:         General: No deformity.   Skin:     General: Skin is warm and dry.   Neurological:      General: No focal deficit present.      Mental Status: She is alert.   Psychiatric:         Mood and Affect: Mood normal.             Assessment   No diagnosis found.      This is a very nice 44-year-old female who presents to me with concern for a \"hemorrhoid\".  She has noticed a mass of tissue outside of her anus for the last 5 years.  She calls it a \"super huge hemorrhoid\".  She has a small amount of bleeding with bowel movements and cleaning at times.  It does not cause her any pain.  She does see some mucus discharge at times.  She states it is always outside of her anus and pops right back out if she tries to reduce the tissue.  She denies any itching.  She has had 1 vaginal delivery with episiotomy.  She denies any fecal incontinence.  No prior anorectal surgery.  No blood thinners.  No prior colonoscopy.  No family history of colon or rectal cancer.  She has not sought medical care for many years due to lack of health insurance until recently.  No known history of sexually transmitted infections.  No history of chronic constipation and she occasionally takes fiber Gummies.    External exam of the anus reveals a 1-2 cm prolapsing condylomatous mass in the right anterior anal canal.  This mass is semipedunculated, friable friable and bleeds on contact.  There is a small approximately 5 mm condylomatous lesion just outside the posterior vagina and the perineum.  Digital rectal examination reveals fair tone without any more proximal palpable masses or lesions.  Anoscopy reveals the presence of the semipedunculated condylomatous lesion in the distal anal canal without any other obvious more proximal masses or lesions.    Plan  I counseled patient that I suspect  she has a large, prolapsing anal condyloma and at least 1 additional small perineal condyloma.  It is also possible she could have underlying anal dysplasia or even cancer though I think this is less likely.  I do recommend plan to go to the operating room for anal exam under anesthesia with anoscopy and excision of the rectal mass in addition to excision and fulguration of any additional perianal condyloma.  The details of this procedure were discussed including the expected recovery time, risks, benefits and alternatives.  Specifically, I counseled patient that condyloma is caused by human papilloma virus (HPV).  Unfortunately, surgery does not cure HPV.  Therefore, she is at risk for recurrent condyloma or even anal dysplasia.  She may require further surveillance procedures or work-up depending on pathology of the rectal mass.  I do recommend she establishes care with a gynecologist for vaginal and cervical examination.  I also advised her to use barrier protection with intercourse.      Patient expressed understanding and was agreeable to schedule surgery with me.  This has been scheduled for 5/24/2024.    After the visit, I discussed her case with her PCP, Dr. Loredo.  I do think it would be prudent to rule out HIV.  I discussed my recommendation with the patient.  She was agreeable to HIV testing.  I placed the order and asked her to go to any [facility] lab facility for this testing.      No orders of the defined types were placed in this encounter.      Imaging & Referrals   VITAL SIGNS  NURSING COMMUNICATION  POCT PREGNANCY URINE  PLACE PIV  ACTIVITY AS TOLERATED  HEIGHT AND WEIGHT  INITIATE ADULT PREOP PROPHYLACTIC ABX PROTOCOL  VERIFY INFORMED CONSENT  NPO  VITAL SIGNS - NOTIFY PHYSICIAN    Follow Up  No follow-ups on file.    Lars Olvera MD

## 2024-05-24 NOTE — ANESTHESIA PREPROCEDURE EVALUATION
PRE-OP EVALUATION    Patient Name: Rubia Ladd    Admit Diagnosis: Rectal mass [K62.89]  Anal condyloma [A63.0]    Pre-op Diagnosis: Rectal mass [K62.89]  Anal condyloma [A63.0]    ANAL EXAMINATION  UNDER ANESTHESIA, EXCISION OF RECTAL MASS, EXCISION AND FULGURATION OF PERIANAL CONDYLOMA    Anesthesia Procedure: ANAL EXAMINATION  UNDER ANESTHESIA, EXCISION OF RECTAL MASS, EXCISION AND FULGURATION OF PERIANAL CONDYLOMA (Anus)    Surgeons and Role:     * Lars Olvera MD - Primary    Pre-op vitals reviewed.  Temp: 98.4 °F (36.9 °C)  Pulse: 86  Resp: 18  BP: 171/93  SpO2: 99 %  Body mass index is 33.67 kg/m².    Current medications reviewed.  Hospital Medications:  • acetaminophen (Tylenol Extra Strength) tab 1,000 mg  1,000 mg Oral Once   • scopolamine (Transderm-Scop) 1 MG/3DAYS patch 1 patch  1 patch Transdermal Once   • lactated ringers infusion   Intravenous Continuous   • [COMPLETED] heparin (Porcine) 5000 UNIT/ML injection 5,000 Units  5,000 Units Subcutaneous Once   • ceFAZolin (Ancef) 2g in 10mL IV syringe premix  2 g Intravenous Once    And   • metRONIDAZOLE in sodium chloride 0.79% (Flagyl) 5 mg/mL IVPB premix 500 mg  500 mg Intravenous Once   • metRONIDAZOLE in sodium chloride 0.79% (Flagyl) 5 mg/mL IVPB premix       • ceFAZolin (Ancef) 2 g/10mL IV syringe premix           Outpatient Medications:     No medications prior to admission.       Allergies: Morphine      Anesthesia Evaluation        Anesthetic Complications           GI/Hepatic/Renal                                 Cardiovascular        Exercise tolerance: good     MET: >4                                           Endo/Other                                  Pulmonary      (+) asthma                     Neuro/Psych                                    Past Surgical History:   Procedure Laterality Date   •   8/3/99   • Other surgical history      bladder surgery for reflux   • Other surgical history      rupture ovarian cyst with  bleeding from fallopian tube-still has both ovaries     Social History     Socioeconomic History   • Marital status:    Tobacco Use   • Smoking status: Never   • Smokeless tobacco: Never   Vaping Use   • Vaping status: Never Used   Substance and Sexual Activity   • Alcohol use: Never   • Drug use: Never   • Sexual activity: Yes     Partners: Male   Other Topics Concern   •  Service No   • Blood Transfusions No   • Caffeine Concern No   • Occupational Exposure No   • Hobby Hazards No   • Sleep Concern No   • Stress Concern No   • Weight Concern Yes   • Special Diet No   • Back Care No   • Exercise No   • Bike Helmet No   • Seat Belt No   • Self-Exams No     History   Drug Use Unknown     Available pre-op labs reviewed.               Airway      Mallampati: II  Mouth opening: 3 FB  TM distance: 4 - 6 cm  Neck ROM: full Cardiovascular    Cardiovascular exam normal.         Dental    Dentition appears grossly intact         Pulmonary    Pulmonary exam normal.                 Other findings          ASA: 2   Plan: spinal and MAC  NPO status verified and           Plan/risks discussed with: patient              Present on Admission:  **None**

## 2024-05-24 NOTE — ANESTHESIA PROCEDURE NOTES
Spinal Block    Performed by: April Waite MD  Authorized by: April Waite MD      General Information and Staff    Start Time:   Anesthesiologist:  April Waite MD  Performed by:  Anesthesiologist  Patient Location:  OR  Site identification: surface landmarks    Reason for Block: at surgeon's request and surgical anesthesia    Preanesthetic Checklist: patient identified, IV checked, risks and benefits discussed, monitors and equipment checked, pre-op evaluation, timeout performed, anesthesia consent and sterile technique used      Procedure Details    Patient Position:  Sitting  Prep: ChloraPrep    Monitoring:  Cardiac monitor, heart rate and continuous pulse ox  Approach:  Midline  Location:  L4-5  Injection Technique:  Single-shot    Needle    Needle Type:  Sprotte  Needle Gauge:  24 G  Needle Length:  3.5 in    Assessment    Sensory Level:  T12  Events: clear CSF, CSF aspirated, well tolerated and blood negative      Additional Comments     First attempt successful.  No resistance, pain or parasthesias on injection. Aspiration of clear CSF both at start of injection and at end.  Saddle block obtained

## 2024-05-24 NOTE — ANESTHESIA POSTPROCEDURE EVALUATION
[facility]    Rubia Ladd Patient Status:  Hospital Outpatient Surgery   Age/Gender 44 year old female MRN [id number]   Location [facility] POST ANESTHESIA CARE UNIT Attending Lars Olvera MD   Hosp Day # 0 PCP Crhistina Loredo DO       Anesthesia Post-op Note    ANAL EXAMINATION  UNDER ANESTHESIA, TRANSEXCISION OF RECTAL MASS    Procedure Summary       Date: 05/24/24 Room / Location:  MAIN OR  /  MAIN OR    Anesthesia Start: 0809 Anesthesia Stop: 0920    Procedure: ANAL EXAMINATION  UNDER ANESTHESIA, TRANSEXCISION OF RECTAL MASS (Anus) Diagnosis:       Rectal mass      Anal condyloma      (Rectal mass [K62.89]Anal condyloma [A63.0])    Surgeons: Lars Olvera MD Anesthesiologist: April Waite MD    Anesthesia Type: spinal ASA Status: 2            Anesthesia Type: spinal    Vitals Value Taken Time   /92 05/24/24 0924   Temp 98.1 °F (36.7 °C) 05/24/24 0908   Pulse 78 05/24/24 0929   Resp 18 05/24/24 0929   SpO2 98 % 05/24/24 0929   Vitals shown include unfiled device data.    Patient Location: PACU    Anesthesia Type: spinal    Airway Patency: patent    Postop Pain Control: adequate    Mental Status: preanesthetic baseline    Nausea/Vomiting: none    Cardiopulmonary/Hydration status: stable euvolemic    Complications: no apparent anesthesia related complications    Postop vital signs: stable    Dental Exam: Unchanged from Preop    Patient to be discharged from PACU when criteria met.

## 2024-05-24 NOTE — OPERATIVE REPORT
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  She has noticed a mass of tissue outside of her anus for the last 5 years.  She calls it a \"super huge hemorrhoid\".  She has a small amount of bleeding with bowel movements and cleaning at times.  It does not cause her any pain.  She does see some mucus discharge at times.  She states it is always outside of her anus and pops right back out if she tries to reduce the tissue.  She denies any itching.  She has had 1 vaginal delivery with episiotomy.  She denies any fecal incontinence.  No prior anorectal surgery.  No blood thinners.  No prior colonoscopy.  No family history of colon or rectal cancer.  She has not sought medical care for many years due to lack of health insurance until recently.  No known history of sexually transmitted infections.  No history of chronic constipation and she occasionally takes fiber Gummies.     External exam of the anus reveals a 1-2 cm prolapsing condylomatous mass in the right anterior anal canal.  This mass is semipedunculated, friable friable and bleeds on contact.  There is a small approximately 5  mm condylomatous lesion just outside the posterior vagina and the perineum.  Digital rectal examination reveals fair tone without any more proximal palpable masses or lesions.  Anoscopy reveals the presence of the semipedunculated condylomatous lesion in the distal anal canal without any other obvious more proximal masses or lesions.     I counseled patient that I suspect she has a large, prolapsing anal condyloma and at least 1 additional small perineal condyloma.  It is also possible she could have underlying anal dysplasia or even cancer though I think this is less likely.  I do recommend plan to go to the operating room for anal exam under anesthesia with anoscopy and excision of the rectal mass in addition to excision and fulguration of any additional perianal condyloma.  The details of this procedure were discussed including the expected recovery time, risks, benefits and alternatives.  Specifically, I counseled patient that condyloma is caused by human papilloma virus (HPV).  Unfortunately, surgery does not cure HPV.  Therefore, she is at risk for recurrent condyloma or even anal dysplasia.  She may require further surveillance procedures or work-up depending on pathology of the rectal mass.  I do recommend she establishes care with a gynecologist for vaginal and cervical examination.  I also advised her to use barrier protection with intercourse.       Patient expressed understanding and was agreeable to schedule surgery with me.  Patient presents for elective surgery today.  Consent was signed.  All questions answered.    Surgical Findings:   Prolapsing, condylomatous-like, pedunculated left anterior rectal mass.  No evidence of internal or external condyloma.    Description of Procedure:   Patient was brought to the operating room on the transport cart.  Bilateral sequential compression devices were placed. Preoperative antibiotics were given.  Patient was induced under spinal anesthesia. Patient was then  carefully flipped prone onto the OR table with all pressure points well-padded.  Patient was positioned in prone jackknife with the buttocks retracted apart with silk tape.  The anus and perianal skin were prepped and draped in the usual sterile fashion.  A timeout was performed.    I began with external exam of the anus, digital rectal exam and anoscopy using a Hill-Torres anoscope.  I encountered the findings as described above.  I decided to proceed with transanal excision of the rectal lesion.  The rectal lesion was excised in a partial-thickness fashion using electrocautery.  The rectal lesion specimen was passed off the field to be sent to pathology.  The wound base was fulgurated.  Hemostasis was achieved using electrocautery and a running, locking 2-0 Vicryl suture.  Additional 2-0 Vicryl suture was used to achieve complete hemostasis.     The anal canal was irrigated with saline solution and suction.  The anal canal was carefully inspected for any additional masses or lesions.  None were noted by direct visualization or repeat digital rectal exam.  The anal canal remained hemostatic.  30 cc of 0.5% Marcaine with epinephrine was circumferentially injected around the anus for local anesthesia.  Skin was cleaned and dried and a dressing of 4 x 4 gauze, ABD pad, paper tape and underwear was applied.    Patient was awakened from anesthesia, flipped back supine onto the transport cart and transferred to the postanesthesia care unit in stable condition.  All sponge, needle instrument counts were correct at the end of the case.  I was present for the entire case.      Lars Olvera MD  5/24/2024  9:25 AM

## 2024-05-24 NOTE — DISCHARGE INSTRUCTIONS
Anal Surgery  Post-Surgical Instructions     Lars Olvera MD         MEDICATIONS  You will be given a prescription for pain.  Take 1 tablet every 6 hours as needed.  Pain medications will ease your pain, but you should expect some incisional pain for about 7-10 days.  You may take acetaminophen (Tylenol) up to 650 mg every 6 hours as needed for mild-moderate pain. You may take ibuprofen (Motrin) up to 600 mg every 6 hours as needed for mild-moderate pain. Take narcotic pain medication as needed for severe pain per your prescription. Do not drive while taking narcotic pain medication. Many patients take a stool softener as narcotics are known to cause constipation. You should walk often, cough and take deep breaths.       DIET  You will begin a high fiber diet.  The easiest way to a high fiber diet is to take a fiber supplement.  An excellent supplement is plain, unflavored Metamucil.  You should take one tablespoon in 8 oz of water twice a day.  Ideally, you should take the supplement before breakfast and dinner.  You may experience some gas bloating for the first 2 weeks. This is normal and will go away as long as you keep taking the supplement.  Other supplements that can be taken include Per Melissa, Benefiber, Konsyl, or Citrucel. Continue to take the fiber supplement for 1 month.     In addition, it is a good idea go to the drugstore and a stool softener, such as docusate, and a gentle laxative such as MiraLAX.  Taking MiraLAX daily and stool softener 1-2 times a day may prevent you from getting overly constipated while on the narcotic drug.  Once you stop taking the prescription pain medication, you may stop taking the stool softener and laxative.        WOUND CARE   You will perform sitz baths two-three times a day for 3-4 weeks.  Sitz baths simply mean soaking your anus in a tub of warm-hot water for about 15 minutes.  Sitz baths will clean the anal wound as well as relax the anal sphincter muscles, which  will help minimize your pain.  Be careful around the anal wound, especially if you have stitches on the outside.  Gently pat your anus dry (never rub) or simply dry your anus with a blow-dryer set to cool/warm.   Do not soak your anus for more than 15 minutes.        ACTIVITY  After surgery, your driving reflexes will be slower, especially if you are taking pain medications.  Therefore, you are restricted from driving until after your follow-up visit and after you have stopped taking your prescription pain meds.  You may walk about the house, go shopping, or eat at a restaurant.  You may also climb stairs and excerise but no weight lifting.  You can sit on your wound, but keep in mind that the less you sit, the less pain you will have.     APPOINTMENT  Please call our office for an appointment in 2-4 weeks after surgery, unless otherwise instructed.  This will allow ample time for the swelling and soreness to resolve before your wound is examined.  There may be some bleeding from your wound.  This is normal.  If you begin bleeding heavily, have fevers, chills, or if you are concerned about your wound, please call us immediately at [phone].     Thank you for entrusting us with your care.

## 2024-05-29 NOTE — PROGRESS NOTES
Sports Medicine Clinic Note     Subjective:    Chief Complaint   Patient presents with    Arm or Hand Injury     LT 5TH FINGER MALLET INJURY;  DOI: 03.07.24;  No new or worsening symptoms;   Pain Score: occasional quick sharp 5      Date of Injury: 3/7/24    Interval History: Rubia Ladd is a 44-year-old right-hand dominant female who presents for a follow-up on her mallet injury to the left little finger sustained on 3/7/24. The patient reports significant improvement since the last visit. She has been compliant with wearing her custom-made splint and changing the tape more frequently. The DIP joint no longer slides into flexion. She reports minimal pain with occasional sharp sensations. Swelling has continued to decrease and she denies any numbness or tingling in the finger.    Objective:    Left Hand Examination:    Inspection:  Improved swelling at the distal interphalangeal joint.  Surrounding skin appears normal with no significant lesions or abnormalities.    Palpation:  No tenderness over the DIP joint.  No palpable defects noted.    Range of Motion:  Deferred due to the nature of the injury but patient reports improved mobility without splint.    Neurovascular:  Fingertips are pink with good capillary refill and normal temperature.  No areas of ischemia or ulcers.    Imaging:    Follow-up ultrasound imaging of the left fifth finger was personally reviewed. The ultrasound shows improved soft tissue appearance with evidence of pseudotendon formation without rupture. There is mild residual swelling but overall alignment and structure are satisfactory.    Assessment:    Mallet injury to the left little finger with routine healing.    Plan:    Additional Imaging/Workup: No further imaging required at this time. Consider further evaluation only if significant changes or setbacks occur.    Therapy: Continue with the occupational therapy protocol focusing on protection and gradual mobilization of the  affected digit. Intermittent active range of motion exercises 6 times per day with resting extension splinting for another 3-4 weeks, followed by nightly splinting for an additional 3-4 weeks. Educate the patient on gentle range of motion exercises for the PIP joint and other unaffected fingers to prevent stiffness. After the splinting period, gradually introduce DIP joint flexion and extension exercises, emphasizing controlled, pain-free movements. Incorporate dexterity and fine motor skills activities, such as picking up small objects, to enhance functional use of the finger.    Medications: Continue to use acetaminophen and/or NSAIDs as needed for pain management.    Bracing/Casting: Continue with the current splinting protocol, ensuring proper wear and frequent tape changes.    Activity Recommendations: Advise against using the affected finger for gripping or heavy lifting. Encourage gentle range of motion exercises for the other joints of the finger.    Follow-Up: Schedule a follow-up visit in 3-4 weeks to assess further progress and determine the next steps in the treatment plan. Instruct the patient to return earlier if symptoms worsen or new symptoms develop.        Santi Dunham DO, CAQSM   Primary Care Sports Medicine    Department of Orthopaedic Surgery  [facility]    [address] [place] [id number]  [address] [place] [id number]    t: [phone]  f: [phone]      [facility]

## 2024-05-30 NOTE — TELEPHONE ENCOUNTER
Spoke with patient after reviewing with REBEKAH Cbaezas PA-C and Dr Olvera.  Advised patient that the symptoms need to be evaluated by her gynecologist or if she continues to be light headed or develops shortness of breath or requires frequent pad changes to go to the ER for evaluation.  Verbalized understanding.

## 2024-05-30 NOTE — TELEPHONE ENCOUNTER
Patient was told to call if she has any issues, had a procedure on Friday 5/324. She is having no issues with her butt or the surgery itself. She's been bleeding a lot from her vagina, and she doesn't have her period since it finished two days before her surgery. She says that at first she thought it was something from the surgery. She went back to work for the first time yesterday, the bleeding had tapered off a lot. When she pees maybe once or twice a day she feels a blood clot. And yesterday around 9 she felt the little rush that one gets after standing up of blood leaving her. She passed a big blood clot. And later on she felt a big gush and felt three blood clots come out by the time she got to the bathroom. By the end of the day she feels fine, the bleeding subsides. When she sits down and stands up and walks around she feels the bleeding more. She is getting nervous about the blood clots. She left work early yesterday and today. She is passing more blood than she thinks she should. The surgery was for a hemorrhoid, but she's getting the bleeding from her vagina. Says no pain or cramps or any other issues, just a lot of blood. Says her lips have paled, and she tends to push her self but she doesn't want to do that.    Please advise  Best callback number is [phone]

## 2024-05-31 NOTE — ED INITIAL ASSESSMENT (HPI)
PT TO THE ED WITH C/O HEAVY VAGINAL BLEEDING AND LARGE CLOTS X6 DAYS. PT STATES HER MENSTRUAL CYCLE ENDED ON 5/20/24 AND SHE HAD HEMORRHOIDECTOMY ON 5/24. PT WENT TO THE IC THIS AFTERNOON AND CONFIRMED IT WAS VAGINAL BLEEDING WITH AN EXAM. PT STATES SHE IS GOING THROUGH LARGE PADS EVERY 2 HOURS. PTS HGB IS 6.3. HX OF FIBROIDS

## 2024-05-31 NOTE — ED QUICK NOTES
Pt ambulated from lobby to d4 - reports heavy vaginal bleeding. Iv in place from IC at left hand 22g.

## 2024-05-31 NOTE — ED PROVIDER NOTES
Patient Seen in: [facility] Emergency Department      History     Chief Complaint   Patient presents with    Abnormal Labs     Stated Complaint: irregular vaginal bleeding, sent from IC due to hemoglobin 6.3    Subjective:   HPI    This is a 44year-old female that was sent from urgent care for vaginal bleeding and hemoglobin of 6.3.  Patient had a negative pregnancy test at the facility  Patient states she had a normal menstrual cycle from  through .  On  she underwent hemorrhoidectomy as an outpatient.  When she returned home on the  she started having vaginal bleeding.  She states normally her cycles are regular.  Since then she has been bleeding daily and passing large clots intermittently.  She is not on birth control.  G1, P1.  It did sound transiently but today was heavy again.  She went to urgent care.  She did speculum exam and they confirmed it was vaginal bleeding.  She has some cramping.  She is in no acute distress.  Reports to me that she had ultrasound at the end of April.  I did review it in the computer.  It showed stable uterine fibroids.  The checked her hemoglobin and told her to come here for further evaluation.      Objective:   Past Medical History:    Asthma (HCC)    exercise induced as a child-no issues as an adult    History of urinary reflux    Problems with swallowing    can't swallow pills    Ruptured ovarian cyst    Visual impairment              Past Surgical History:   Procedure Laterality Date    Hemorrhoidectomy N/A 2024      8/3/99    Other surgical history      bladder surgery for reflux    Other surgical history      rupture ovarian cyst with bleeding from fallopian tube-still has both ovaries                Social History     Socioeconomic History    Marital status:    Tobacco Use    Smoking status: Never     Passive exposure: Never    Smokeless tobacco: Never   Vaping Use    Vaping status: Never Used   Substance and Sexual Activity     Alcohol use: Never    Drug use: Never    Sexual activity: Yes     Partners: Male   Other Topics Concern     Service No    Blood Transfusions No    Caffeine Concern No    Occupational Exposure No    Hobby Hazards No    Sleep Concern No    Stress Concern No    Weight Concern Yes    Special Diet No    Back Care No    Exercise No    Bike Helmet No    Seat Belt No    Self-Exams No              Review of Systems    Positive for stated complaint: irregular vaginal bleeding, sent from IC due to hemoglobin 6.3  Other systems are as noted in HPI.  Constitutional and vital signs reviewed.      All other systems reviewed and negative except as noted above.    Physical Exam     ED Triage Vitals [05/31/24 1734]   BP (!) 163/87   Pulse 120   Resp 20   Temp 98.3 °F (36.8 °C)   Temp src Temporal   SpO2 98 %   O2 Device None (Room air)       Current Vitals:   Vital Signs  BP: 147/79  Pulse: 102  Resp: 22  Temp: 98.6 °F (37 °C)  Temp src: Temporal  MAP (mmHg): 97    Oxygen Therapy  SpO2: 100 %  O2 Device: None (Room air)            Physical Exam    GENERAL: Awake, alert oriented x3, nontoxic appearing.   SKIN: Normal, warm, and dry.  HEENT:  Pupils equally round and reactive to light. Conjuctiva clear.  Oropharynx is clear and moist.   Lungs: Clear to auscultation bilaterally with no rales, no retractions, and no wheezing.  HEART:  Regular rate and rhythm. S1 and S2. No murmurs, no rubs or gallops.   ABDOMEN: Soft, nontender and nondistended. Normoactive bowel sounds. No rebound. No guarding.   EXTREMITIES: Warm with brisk capillary refill.         ED Course     Labs Reviewed   COMP METABOLIC PANEL (14) - Abnormal; Notable for the following components:       Result Value    Glucose 108 (*)     Calcium, Total 8.0 (*)     Albumin 3.2 (*)     All other components within normal limits   CBC W/ DIFFERENTIAL - Abnormal; Notable for the following components:    RBC 2.54 (*)     HGB 6.0 (*)     HCT 19.8 (*)     MCV 78.0 (*)     MCH  23.6 (*)     MCHC 30.3 (*)     All other components within normal limits   CBC WITH DIFFERENTIAL WITH PLATELET    Narrative:     The following orders were created for panel order CBC With Differential With Platelet.  Procedure                               Abnormality         Status                     ---------                               -----------         ------                     CBC W/ DIFFERENTIAL[[id number]]          Abnormal            Final result                 Please view results for these tests on the individual orders.   TYPE AND SCREEN    Narrative:     The following orders were created for panel order Type and screen.  Procedure                               Abnormality         Status                     ---------                               -----------         ------                     ABORH (Blood Type)[[id number]]                               Final result               Antibody Screen[[id number]]                                  Final result                 Please view results for these tests on the individual orders.   ABORH (BLOOD TYPE)   ANTIBODY SCREEN   ABORH CONFIRMATION   PREPARE RBC   RAINBOW DRAW LAVENDER   RAINBOW DRAW LIGHT GREEN   RAINBOW DRAW BLUE                      MDM        This is a 44year-old female that was sent from urgent care for vaginal bleeding and hemoglobin of 6.3.  Patient had a negative pregnancy test at the facility.  Differential includes dysfunctional uterine bleeding, uterine fibroids.    IV line was established of normal saline.  Basic labs were obtained.  CBC: CMP: BUN 13.  Creatinine 0.9.  Glucose 108.  Bicarb 23.  CMP: BUN 13.  Creatinine 0.9.  Glucose 108.  Bicarb 23.      I discussed case with Dr. Zee from OB.  I informed her that patient just had an ultrasound and does not want a repeat 1.  She was given 1 g of IV TXA.  She is very averse to swallowing pills.  We crushed up the Provera and she took it in applesauce.  She was transfused 1 unit of  packed RBCs.      Plan to DC on Provera with close outpatient follow-up this week.  Return if worse.  Patient's bleeding did slow she underwent through 1 pad in the ER.    Discharged home in good condition.              Previous pelvic ultrasound as below    Impression   CONCLUSION:  Stable uterine fibroids, sizable, the largest of which measures up to 7.3 cm.  Measurements given above.  Right ovarian cyst appears simple by ultrasound 5.4 cm maximum dimension.  No pelvic free fluid.        LOCATION:  [facility]        Dictated by (CST): Edwin Ferguson MD on 4/21/2024 at 10:25 AM      Finalized by (CST): Edwin Ferguson MD on 4/21/2024 at 10:27 AM       Disposition and Plan     Clinical Impression:  1. Dysfunctional uterine bleeding         Disposition:  Discharge  6/1/2024  3:51 pm    Follow-up:  Tiny Zee DO  [address] [place] [address]  [phone]    Follow up on 6/3/2024            Medications Prescribed:  Discharge Medication List as of 6/1/2024 12:05 AM        START taking these medications    Details   medroxyPROGESTERone Acetate (PROVERA) 10 MG Oral Tab Take 1 tablet (10 mg total) by mouth in the morning, at noon, and at bedtime for 7 days., Normal, Disp-21 tablet, R-0

## 2024-05-31 NOTE — ED PROVIDER NOTES
Patient Seen in: [facility]      History     Chief Complaint   Patient presents with    Eval-G     Stated Complaint: vaginal bleeding    Subjective:   HPI  45 yo with asthma, ovarian cyst, and knee on May 24 presents complaining of vaginal bleeding with large clots that started on May 24.  Patient had a pelvic ultrasound on 2024 which showed multiple uterine fibroids and a right ovarian cyst.  She has a follow-up appointment with Dr. Hess in July for these results.  She reports shortness of breath with exertion.    Objective:   Past Medical History:    Asthma (HCC)    exercise induced as a child-no issues as an adult    History of urinary reflux    Problems with swallowing    can't swallow pills    Ruptured ovarian cyst    Visual impairment              Past Surgical History:   Procedure Laterality Date    Hemorrhoidectomy N/A 2024      8/3/99    Other surgical history      bladder surgery for reflux    Other surgical history      rupture ovarian cyst with bleeding from fallopian tube-still has both ovaries                Social History     Socioeconomic History    Marital status:    Tobacco Use    Smoking status: Never     Passive exposure: Never    Smokeless tobacco: Never   Vaping Use    Vaping status: Never Used   Substance and Sexual Activity    Alcohol use: Never    Drug use: Never    Sexual activity: Yes     Partners: Male   Other Topics Concern     Service No    Blood Transfusions No    Caffeine Concern No    Occupational Exposure No    Hobby Hazards No    Sleep Concern No    Stress Concern No    Weight Concern Yes    Special Diet No    Back Care No    Exercise No    Bike Helmet No    Seat Belt No    Self-Exams No              Review of Systems   All other systems reviewed and are negative.      Positive for stated complaint: vaginal bleeding  Other systems are as noted in HPI.  Constitutional and vital signs reviewed.      All other systems reviewed and  negative except as noted above.    Physical Exam     ED Triage Vitals [05/31/24 1503]   /71   Pulse 109   Resp (!) 30   Temp 98.5 °F (36.9 °C)   Temp src Temporal   SpO2 98 %   O2 Device        Current Vitals:   Vital Signs  BP: 129/71  Pulse: 109  Resp: (!) 30  Temp: 98.5 °F (36.9 °C)  Temp src: Temporal    Oxygen Therapy  SpO2: 98 %            Physical Exam  Vitals and nursing note reviewed. Exam conducted with a chaperone present (Vivian COATS).   Constitutional:       Appearance: She is well-developed.   Cardiovascular:      Rate and Rhythm: Regular rhythm. Tachycardia present.      Heart sounds: Normal heart sounds.   Pulmonary:      Effort: Pulmonary effort is normal.      Breath sounds: Normal breath sounds.   Genitourinary:     Comments: Moderate to large amount of vaginal bleeding  Skin:     General: Skin is warm and dry.      Coloration: Skin is pale.   Neurological:      Mental Status: She is alert and oriented to person, place, and time.               ED Course     Labs Reviewed   [facility] POCT URINALYSIS DIPSTICK - Abnormal; Notable for the following components:       Result Value    Urine Clarity Cloudy (*)     Protein urine 100 (*)     Ketone, Urine Trace (*)     Bilirubin, Urine Small (*)     Blood, Urine Large (*)     Leukocyte esterase urine Small (*)     All other components within normal limits   POCT ISTAT CHEM8 CARTRIDGE - Abnormal; Notable for the following components:    ISTAT Hematocrit 19 (*)     ISTAT Glucose 117 (*)     All other components within normal limits   POCT PREGNANCY URINE - Normal   POCT CBC                      MDM     Medical Decision Making  43 yo with asthma, ovarian cyst, and knee on May 24 presents complaining of vaginal bleeding with large clots that started on May 24.  Patient had a pelvic ultrasound on April 20, 2024 which showed multiple uterine fibroids and a right ovarian cyst.  She has a follow-up appointment with Dr. Hess in July for these results.  She reports  shortness of breath with exertion.    Hemoglobin 6.2.  Patient with tachycardia.  On exam patient with vaginal bleeding with no rectal bleeding.  Previous ultrasound report reviewed that shows multiple uterine fibroids.  Patient will be sent to the emergency room due to anemia.  Patient declined ambulance transfer and will sign out AGAINST MEDICAL ADVICE to ambulance transfer.        Disposition and Plan     Clinical Impression:  1. Anemia, unspecified type    2. Vaginal bleeding    3. History of uterine leiomyoma         Disposition:  Ic to ed  5/31/2024  4:37 pm    Follow-up:  No follow-up provider specified.        Medications Prescribed:  Current Discharge Medication List

## 2024-05-31 NOTE — ED INITIAL ASSESSMENT (HPI)
Last Friday had a hemorrhoid removed. Friday night, pt started bleeding from her vagina. Saturday bleeding with large clots was worse and was changing  pads every 2 hours. Bleeding improved but still passing large clots. Yesterday and today feels tired and mild sob. History of ovarian cysts/fibroids. Difficult to stand up straight.

## 2024-06-01 NOTE — DISCHARGE INSTRUCTIONS
Provera as prescribed.  Please crush up and put in applesauce and drink some water after taking it  Return if worse  Follow-up with OB on Monday  Pelvic rest: No sex, tampons, douching or heavy lifting

## 2024-06-01 NOTE — ED QUICK NOTES
Pt states she is unable to swallow pills. Per pt she has tried alternatives such as dissolving or crushing medicine. States it taste bad this way and she ends up throwing up the medicine. Pt refuses Provera and any other  PO medication due to this.

## 2024-06-01 NOTE — ED QUICK NOTES
Patient sitting upright in bed, blood infusing RAC iv. Denies any fever. Pt a&ox4, easy nl breathing. No distress.

## 2024-06-04 NOTE — TELEPHONE ENCOUNTER
Patient saw Dr. Zee 06/04/2024, states she was supposed to receive another dose of Provera for a week, please advise and call patient when able.

## 2024-06-04 NOTE — TELEPHONE ENCOUNTER
Spoke with patient. She saw Dr. Zee yesterday for abnormal uterine bleeding. She was given a prescription for Provera in the ER. To take 1 tablet TID for 7 days. Discussed continuing Provera BID x 7 days then daily x 7 days. No prescription at the pharmacy. Aware I will get a message to Dr. Zee and call with recommendations. Provera order pended. Verbalized understanding.

## 2024-06-04 NOTE — TELEPHONE ENCOUNTER
Patient saw Dr. Zee 06/04/2024 and discussed surgery options, calling office back to schedule Hysterectomy. Please call patient when able

## 2024-06-06 NOTE — TELEPHONE ENCOUNTER
Spoke with Dr. Zee about patients doctors note request. She recommends we order a CBC for the patient to help determine the plan of care.    I called and spoke to the patient. Instructed her of Dr. Zee's recommendation of ordering a CBC. Understanding verbalized.     Will route order to Dr. Zee to sign.

## 2024-06-06 NOTE — TELEPHONE ENCOUNTER
From: Rubia Ladd  To: Tiny Zee  Sent: 6/6/2024 12:11 PM CDT  Subject: dr note for work    I need a dr note to go back to work but at this point I still can't stand or walk for more than 10 mins without being out of breath so I really don't feel like I can go back personally...im not sure if they will require a note stating I cannot come back because of restrictions in order for me to not get fired and just take a leave of absence or what...kathie never had to deal with an extended amt of time off of work...also im having surgery on July 5th which will keep me out for another 6 to 8 weeks so I'd rather just use this time before surgery to rest and try and feel better from all this blood loss I had....so if theres a way to get a note saying I'm not able to come back because of restrictions that would be great...if not I will just let them fire me or whatever they have to do....Thank you

## 2024-06-12 NOTE — TELEPHONE ENCOUNTER
Patient calling to check on the blood work order and states she no longer needs a note for work because she got her [place] paperwork.   Please call once order for CBC is signed.

## 2024-06-14 NOTE — PROGRESS NOTES
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 Patient notes that she was issued digit extension splint and has been wearing it consistently over the past 10-11 weeks. Complications with other unexpected medical conditions have delayed beginning therapy over the past few months. OT services received physician's orders for evaluation and treatment as indicated.  Pt describes pain level current 0/10, at best 0/10, at worst 5/10. Patient notes dull aching pain with movement of LUE small finger.  Current functional limitations include functional gripping, lifting, carrying, and overall functional use of LUE.    Rubia describes prior level of function as functionally (I) w/ all ADL/IADLs.   Employment: Temporary leave; [employer]  Hand Dominance: right  Living Situation: Family  Imaging/Tests:   3/28/2024: XR FINGER(S) (MIN 2 VIEWS), LEFT 5TH (CPT=73140)   CONCLUSION:  No acute visible fracture.  See above description.    Pt goals include improving function in LUE overall.  Past medical history was reviewed with Rubia. Significant findings include mallet deformity of left little finger.    ASSESSMENT  Rubia presents to occupational therapy evaluation with primary c/o LUE small finger joint stiffness, pain, and swelling s/p recent mallet deformity of left little finger in March 2024. Patient notes that she was issued digit extension splint and has been wearing it consistently over the past 10-11 weeks. Complications with other unexpected medical conditions have  delayed beginning therapy over the past few months. OT services received physician's orders for evaluation and treatment as indicated. The results of the objective tests and measures show decreased ROM, swelling, and function to L small finger. Functional deficits include but are not limited to functional gripping, lifting, carrying, and overall functional use of LUE. Signs and symptoms are consistent with diagnosis of mallet deformity of left small finger. Pt and OT discussed evaluation findings, pathology, POC and HEP.  Pt voiced understanding and performs HEP correctly without reported pain. Skilled Occupational Therapy is medically necessary to address the above impairments and reach functional goals.     OBJECTIVE    OBSERVATION: Patient noted with full digit extension of L small finger. Stiffness noted specifically to DIP joint and PIP joint respectively most likely due to prolonged immobilization.    ORTHOTICS: Patient DIP extension splint instructed to be worn only at nighttime at this time. Patient may wear splint when active to avoid painful activities but encouraged to complete ROM exercises for LUE routinely each day.    SCAR: none    SENSORY: Patient denies any numbness/tingling.    CIRCUMFERENTIAL EDEMA (cm):  D5 (Small Finger): DIP: R=4.1cm; L=3.9cm    AROM (degrees): Patient BUE AROM for wrist and digits WFL except noted below    LEFT HAND:    Thumb IF MF RF SF   MP 0-50 0-85 0-85 0-85 0-75   PIP IP=0-60 0-90 0-90 0-90 0-65   DIP  0-65 0-65 0-65 0-5   RAMON 110 240 240 240 145       STRENGTH: MMT, , and pinch measurements deferred at this visit. To be assessed further with future treatment sessions.    Today’s Treatment and Response:   Pt education was provided on exam findings, treatment diagnosis, treatment plan, expectations, and prognosis. Pt was also provided recommendations for use of heat/cold, contrast bath, bracing, and HEP exercises.   Patient was instructed in and issued a HEP for:    -Tendon Gliding Exercises  -Flexion Joint Blocking Exercises DIP/PIP Exercises  -Digit Extension Exercises    Charges: OT Eval: Low Complexity, TEx1      Total Timed Treatment: 45 min     Total Treatment Time: 45 min     Based on clinical rationale and outcome measures, this evaluation involved Low Complexity decision making due to 1-2 personal factors/comorbidities, 3 body structures involved/activity limitations, and evolving symptoms including changing pain levels.  PLAN OF CARE   Goals: (to be met in 8 visits)   1. Patient will demonstrate full AROM RAMON for L small finger indicating increased ability to manage functional ADL/IADLs.  2. Patient will demonstrate independence with swelling management techniques indicating increased ability to manage functional ADL/IADLs.  3. Patient will demonstrate at least 30.0 lbs or greater functional LUE  strength indicating increased ability to manage functional ADL./IADLs.  4. Patient will be independent and compliant with comprehensive HEP to maintain progress achieved in OT.    Frequency / Duration: Patient will be seen for 2x/week or a total of 8 visits over a 90 day period.  Treatment will include: Manual Therapy, Neuromuscular Re-education, Self-Care Home Management, Therapeutic Activities, Therapeutic Exercise, and Home Exercise Program instruction, Splinting, Modalities PRN    Education or treatment limitation: None  Rehab Potential:good    QuickDASH Outcome Score  Score: 4.55 % (6/7/2024  9:04 AM)      Patient/Family/Caregiver was advised of these findings, precautions, and treatment options and has agreed to actively participate in planning and for this course of care.    Thank you for your referral. Please co-sign or sign and return this letter via fax as soon as possible to [phone]. If you have any questions, please contact me at Dept: [phone]    Sincerely,  Electronically signed by therapist: Brad Bella OT  Physician's certification required:  Yes  I certify the need for these services furnished under this plan of treatment and while under my care.    X___________________________________________________ Date____________________    Certification From: 6/14/2024  To:9/12/2024

## 2024-06-17 NOTE — PROGRESS NOTES
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 Some improvement in DIP flexion although some increased swelling to dorsal L small finger DIP joint causing slight extension lag that improves with ROM stretching. Patient notes dull, aching pain with maximal movement about 5/10 at most rated.      Objective:     CIRCUMFERENTIAL EDEMA (cm):  D5 (Small Finger): DIP: R=4.1cm; L=3.9cm     AROM (degrees): Patient BUE AROM for wrist and digits WFL except noted below     LEFT HAND:     Thumb IF MF RF SF   MP 0-50 0-85 0-85 0-85 0-85   PIP IP=0-60 0-90 0-90 0-90 0-75   DIP   0-65 0-65 0-65 10-35   RAMON 110 240 240 240 185      Assessment: Patient demonstrating improvements in L small finger DIP and PIP flexion comparative to a few days ago on evaluation. Increased swelling/edema to DIP joint is causing slight extension lag that improves after stretching. Patient provided with treatment including pulsed ultrasound, AROM/AAROM/PROM stretching, kinesiotape support today. Patient encouraged to continue with consistent ROM exercise completion and protection of DIP joint during activity until stiffness improves.      Goals:  (to be met in 8 visits)   1. Patient will demonstrate full AROM RAMON for L small finger indicating increased ability to manage functional ADL/IADLs.  2. Patient will demonstrate independence with swelling management techniques indicating increased ability to manage functional ADL/IADLs.  3. Patient will demonstrate at least 30.0 lbs or greater functional LUE  strength indicating increased ability to manage functional ADL./IADLs.  4. Patient will be independent and  compliant with comprehensive HEP to maintain progress achieved in OT.     Plan: Patient will continue to be seen for 2x/week or a total of 8 visits over a 90 day period.  Treatment will include: Manual Therapy, Neuromuscular Re-education, Self-Care Home Management, Therapeutic Activities, Therapeutic Exercise, and Home Exercise Program instruction, Splinting, Modalities PRN    Date: 6/17/2024  TX#: 2/8 Date:                 TX#: 3/8 Date:                 TX#: 4/8 Date:                 TX#: 5/8   -5 min hot pack to L hand  -Ultrasound treatment to dorsal L small finger DIP area w/ settings 3.3 MHz, 1.2 W/cm2, 50% duty for 8 min  -AROM/AAROM/PROM  stretching for L small finger DIP and PIP flexion and extension  -Composite hook fist and full fist stretching  -I-Strip Kinesiotape Support to L small finger EDP 50% stretch  -I-Strip Kinesiotape Support to L small finger FDP 50% stretch  -Review of HEP        HEP:   -Tendon Gliding Exercises  -Flexion Joint Blocking Exercises DIP/PIP Exercises  -Digit Extension Exercises    Charges: USx1, TEx2       Total Timed Treatment: 45 min  Total Treatment Time: 45 min

## 2024-06-18 NOTE — TELEPHONE ENCOUNTER
Telephoned Rubia Ladd and name,  verified with patient. Notified Rubia Ladd of left breast 1 site and right breast 1 site negative for malignancy biopsy result. Concordance pending. Rubia Ladd reports biopsy site is healing well. Radiologist recommends 6 month follow up mammogram. Pt verbalized understanding and had no further questions at this time.

## 2024-06-18 NOTE — PROGRESS NOTES
Patient aware of results & recommendations for IV iron infusions prior to surgery. Will call her once the order has been faxed to the cancer center to give her the scheduling number. Verbalized understanding.    On hold x 30 minutes with [employer]. Will try again later.

## 2024-06-20 NOTE — TELEPHONE ENCOUNTER
Received fax from [facility] for alternate tx for Venofer shortage: POORNIMA Perdomo recommends - Ferrlecit 125mg IV x4 doses every other day order faxed to [facility].

## 2024-06-20 NOTE — PROGRESS NOTES
Spoke with Rod CORDON From [facility]. No PA required for CPT codes 49625 & . Ref #[id number]. Will fax order to the [facility] once signed.     Patient aware no PA required. Phone number given to call on Monday if she hasn't heard from the cancer center. Verbalized understanding.

## 2024-06-20 NOTE — PROGRESS NOTES
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 5-6/10 aching pain with movement of L small finger DIP joint.      Objective:     CIRCUMFERENTIAL EDEMA (cm):  D5 (Small Finger): DIP: R=4.1cm; L=3.9cm     AROM (degrees): Patient BUE AROM for wrist and digits WFL except noted below     LEFT HAND:     Thumb IF MF RF SF   MP 0-50 0-85 0-85 0-85 0-85   PIP IP=0-60 0-90 0-90 0-90 0-75   DIP   0-65 0-65 0-65 10-40   RAMON 110 240 240 240 190      Assessment: Patient demonstrating improvements in L small finger DIP and PIP flexion comparative to a few days ago on evaluation. Increased swelling/edema to DIP joint is causing slight extension lag that improves after stretching. Patient provided with treatment including pulsed ultrasound, AROM/AAROM/PROM stretching, kinesiotape support today. Patient encouraged to continue with consistent ROM exercise completion and protection of DIP joint during activity until stiffness improves.      Goals:  (to be met in 8 visits)   1. Patient will demonstrate full AROM RAMON for L small finger indicating increased ability to manage functional ADL/IADLs.  2. Patient will demonstrate independence with swelling management techniques indicating increased ability to manage functional ADL/IADLs.  3. Patient will demonstrate at least 30.0 lbs or greater functional LUE  strength indicating increased ability to manage functional ADL./IADLs.  4. Patient will be independent and compliant with comprehensive HEP to maintain progress achieved in OT.     Plan: Patient will continue to be seen for 2x/week or a total of 8 visits over a 90 day period.  Treatment will  include: Manual Therapy, Neuromuscular Re-education, Self-Care Home Management, Therapeutic Activities, Therapeutic Exercise, and Home Exercise Program instruction, Splinting, Modalities PRN    Date: 6/17/2024  TX#: 2/8 Date: 6/20/2024            TX#: 3/8 Date:                 TX#: 4/8 Date:                 TX#: 5/8   -5 min hot pack to L hand  -Ultrasound treatment to dorsal L small finger DIP area w/ settings 3.3 MHz, 1.2 W/cm2, 50% duty for 8 min  -AROM/AAROM/PROM  stretching for L small finger DIP and PIP flexion and extension  -Composite hook fist and full fist stretching  -I-Strip Kinesiotape Support to L small finger EDP 50% stretch  -I-Strip Kinesiotape Support to L small finger FDP 50% stretch  -Review of HEP -5 min hot pack to L hand  -Ultrasound treatment to dorsal L small finger DIP area w/ settings 3.3 MHz, 1.2 W/cm2, 50% duty for 8 min  -AROM/AAROM/PROM  stretching for L small finger DIP and PIP flexion and extension  -Composite hook fist and full fist stretching  -I-Strip Kinesiotape Support to L small finger EDP 50% stretch  -I-Strip Kinesiotape Support to L small finger FDP 50% stretch       HEP:   -Tendon Gliding Exercises  -Flexion Joint Blocking Exercises DIP/PIP Exercises  -Digit Extension Exercises    Charges: USx1, TEx2       Total Timed Treatment: 45 min  Total Treatment Time: 45 min

## 2024-06-24 NOTE — PROGRESS NOTES
Education Record    Learner:  Patient    Disease / Diagnosis: pt here for ferrlecit    Barriers / Limitations:  None    Method:  Brief focused, printed material and  reinforcement    General Topics:  Plan of care reviewed    Outcome: pt tolerated infusion with no c/co

## 2024-06-24 NOTE — PROGRESS NOTES
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 Small finger still achy painful with movement but less so than last week rated 4/10 today.      Objective:     CIRCUMFERENTIAL EDEMA (cm):  D5 (Small Finger): DIP: R=4.1cm; L=3.9cm     AROM (degrees): Patient BUE AROM for wrist and digits WFL except noted below     LEFT HAND:     Thumb IF MF RF SF   MP 0-50 0-85 0-85 0-85 0-85   PIP IP=0-60 0-90 0-90 0-90 0-75   DIP   0-65 0-65 0-65 10-50   RAMON 110 240 240 240 200      Assessment: Patient demonstrating improvements in L small finger DIP and PIP flexion comparative to a few days ago on evaluation. Increased swelling/edema to DIP joint is causing slight extension lag that improves after stretching. Patient provided with treatment including pulsed ultrasound, AROM/AAROM/PROM stretching. Patient encouraged to continue with consistent ROM exercise completion and protection of DIP joint during activity until stiffness improves.      Goals:  (to be met in 8 visits)   1. Patient will demonstrate full AROM RAMON for L small finger indicating increased ability to manage functional ADL/IADLs.  2. Patient will demonstrate independence with swelling management techniques indicating increased ability to manage functional ADL/IADLs.  3. Patient will demonstrate at least 30.0 lbs or greater functional LUE  strength indicating increased ability to manage functional ADL./IADLs.  4. Patient will be independent and compliant with comprehensive HEP to maintain progress achieved in OT.     Plan: Patient will continue to be seen for 2x/week or a total of 8 visits over a 90 day period.  Treatment will include:  Manual Therapy, Neuromuscular Re-education, Self-Care Home Management, Therapeutic Activities, Therapeutic Exercise, and Home Exercise Program instruction, Splinting, Modalities PRN    Date: 6/17/2024  TX#: 2/8 Date: 6/20/2024            TX#: 3/8 Date: 6/24/2024            TX#: 4/8 Date:                 TX#: 5/8   -5 min hot pack to L hand  -Ultrasound treatment to dorsal L small finger DIP area w/ settings 3.3 MHz, 1.2 W/cm2, 50% duty for 8 min  -AROM/AAROM/PROM  stretching for L small finger DIP and PIP flexion and extension  -Composite hook fist and full fist stretching  -I-Strip Kinesiotape Support to L small finger EDP 50% stretch  -I-Strip Kinesiotape Support to L small finger FDP 50% stretch  -Review of HEP -5 min hot pack to L hand  -Ultrasound treatment to dorsal L small finger DIP area w/ settings 3.3 MHz, 1.2 W/cm2, 50% duty for 8 min  -AROM/AAROM/PROM  stretching for L small finger DIP and PIP flexion and extension  -Composite hook fist and full fist stretching  -I-Strip Kinesiotape Support to L small finger EDP 50% stretch  -I-Strip Kinesiotape Support to L small finger FDP 50% stretch -5 min hot pack to L hand  -Ultrasound treatment to dorsal L small finger DIP area w/ settings 3.3 MHz, 1.2 W/cm2, 50% duty for 8 min  -AROM/AAROM/PROM  stretching for L small finger DIP and PIP flexion and extension  -Composite hook fist and full fist stretching  -Yellow Theraputty Exercises  -Pink Sponge Squeeze Exercises      HEP:   -Tendon Gliding Exercises  -Flexion Joint Blocking Exercises DIP/PIP Exercises  -Digit Extension Exercises  -Yellow Theraputty Exercises    Charges: USx1, TEx2       Total Timed Treatment: 45 min  Total Treatment Time: 45 min

## 2024-06-24 NOTE — PROGRESS NOTES
Rubia Ladd is a 44 year old female  Patient's last menstrual period was 2024 (exact date).   Chief Complaint   Patient presents with    Gyn Problem     ER f/u from 24 was given Provera and blood transfusion was done.  - Pt had sx on 2024: ANAL EXAM UNDER ANESTHESIA, EXCISION OF RECTAL MASS, EXCISION AND FULGURATION OF PERIANAL CONDYLOMA  - when she got home she noticed she was having vaginal bleeding along with blood clots  since  after taking provera bleeding has gotten better. But still spotting  -Ultrasound done on 2024   .  Patient has h/o long heavy menses. She had her period and then started heavy bleeding again 1 week later. Patient went to ED 24 and her Hb was 6.3, she received 1 unit of PRBCs and started on provera, bleeding is lighter   OBSTETRICS HISTORY:  OB History    Para Term  AB Living   1 1 1     1   SAB IAB Ectopic Multiple Live Births           1      # Outcome Date GA Lbr Shyam/2nd Weight Sex Type Anes PTL Lv   1 Term 99 40w0d  7 lb 3 oz (3.26 kg) F NORMAL SPONT   BEHZAD       GYNE HISTORY:  Periods irregular heavy    History   Sexual Activity    Sexual activity: Yes    Partners: Male                 MEDICAL HISTORY:  Past Medical History:    Anal condyloma    nausea post operatively    Asthma (HCC)    exercise induced as a child-no issues as an adult    H/O pelvic ultrasound    CONCLUSION:  Stable uterine fibroids, sizable, the largest of which measures up to 7.3 cm.  Measurements given above.  Right ovarian cyst appears simple by ultrasound 5.4 cm maximum dimension.  No pelvic free fluid.    History of urinary reflux    PONV (postoperative nausea and vomiting)    Problems with swallowing    can't swallow pills    Ruptured ovarian cyst    Visual impairment       SURGICAL HISTORY:  Past Surgical History:   Procedure Laterality Date      8/3/99    Other surgical history      bladder surgery for reflux    Other surgical history       rupture ovarian cyst with bleeding from fallopian tube-still has both ovaries    Other surgical history  05/24/2024    ANAL EXAM UNDER ANESTHESIA, EXCISION OF RECTAL MASS, EXCISION AND FULGURATION OF PERIANAL CONDYLOMA       SOCIAL HISTORY:  Social History     Socioeconomic History    Marital status:      Spouse name: Not on file    Number of children: Not on file    Years of education: Not on file    Highest education level: Not on file   Occupational History    Not on file   Tobacco Use    Smoking status: Never     Passive exposure: Never    Smokeless tobacco: Never   Vaping Use    Vaping status: Never Used   Substance and Sexual Activity    Alcohol use: Never    Drug use: Never    Sexual activity: Yes     Partners: Male   Other Topics Concern     Service No    Blood Transfusions No    Caffeine Concern No    Occupational Exposure No    Hobby Hazards No    Sleep Concern No    Stress Concern No    Weight Concern No    Special Diet No    Back Care No    Exercise No    Bike Helmet No    Seat Belt No    Self-Exams No   Social History Narrative    Not on file     Social Determinants of Health     Financial Resource Strain: Not on file   Food Insecurity: Not on file   Transportation Needs: Not on file   Physical Activity: Not on file   Stress: Not on file   Social Connections: Not on file   Housing Stability: Not on file       FAMILY HISTORY:  Family History   Problem Relation Age of Onset    No Known Problems Father     Other (ovarian cysts) Mother     Alcohol abuse Mother     No Known Problems Daughter     Hypertension Maternal Grandmother     No Known Problems Maternal Grandfather     No Known Problems Paternal Grandmother     No Known Problems Paternal Grandfather     Uterine Cancer Sister         dx age 30s    Pancreatic Cancer Neg     Colon Cancer Neg     Prostate Cancer Neg     Ovarian Cancer Neg     Breast Cancer Neg     Cancer Neg     Endometriosis Neg     Infertility Neg         MEDICATIONS:    Current Outpatient Medications:     medroxyPROGESTERone Acetate (PROVERA) 10 MG Oral Tab, Take 1 tablet (10 mg total) by mouth As Directed. Take 1 tablet by mouth in the morning and at bedtime for 7 days then take 1 tablet daily for 7 days., Disp: 21 tablet, Rfl: 0    acetaminophen-codeine 120-12 MG/5ML Oral Solution, Take 5 mL by mouth every 6 (six) hours as needed for Pain. (Patient not taking: Reported on 5/29/2024), Disp: 90 mL, Rfl: 0    ondansetron 4 MG Oral Tablet Dispersible, Take 1 tablet (4 mg total) by mouth every 4 (four) hours as needed for Nausea., Disp: 10 tablet, Rfl: 0    ALLERGIES:    Allergies   Allergen Reactions    Morphine NAUSEA AND VOMITING         Review of Systems:  Constitutional:  Denies fatigue, night sweats, hot flashes  Eyes:  denies blurred or double vision  Cardiovascular:  denies chest pain or palpitations  Respiratory:  denies shortness of breath  Gastrointestinal:  denies heartburn, abdominal pain, diarrhea or constipation  Genitourinary:  denies dysuria, incontinence, abnormal vaginal discharge, vaginal itching  Musculoskeletal:  denies back pain.  Skin/Breast:  Denies any breast pain, lumps, or discharge.   Neurological:  denies headaches, extremity weakness or numbness.  Psychiatric: denies depression or anxiety.  Endocrine:   denies excessive thirst or urination.  Heme/Lymph:  denies history of anemia, easy bruising or bleeding.      PHYSICAL EXAM:   Constitutional: well developed, well nourished  Head/Face: normocephalic  Abdomen:  soft, nontender, nondistended, no masses  Skin/Hair: no unusual rashes or bruises  Extremities: no edema, no cyanosis  Psychiatric:  Oriented to time, place, person and situation. Appropriate mood and affect    Pelvic Exam:  External Genitalia: normal appearance, hair distribution, and no lesions  Urethral Meatus:  normal in size, location, without lesions and prolapse  Bladder:  No fullness, masses or tenderness  Vagina:   Normal appearance without lesions, no abnormal discharge  Cervix:  Normal without tenderness on motion  Uterus: enlarged in size, irregular contour,   Adnexa: normal without masses or tenderness  Perineum: normal  Anus: no hemorroids       4/20/24        FINDINGS:                UTERUS:  11.09 cm x 5.48 cm x 12.04 cm    Endometrium Thickness: 1.30 cm    Multiple uterine fibroids.  Right fundal fibroid 7.3 x 6.2 cm.  Left fundal fibroid 5.5 x 4.3 cm.  Left body fibroid 4.7 x 4.3 cm.  Right body fibroid 3.0 x 3.0 cm.  Fibroid in the cervical region 4.7 x 5.6 cm.  RIGHT OVARY:  5.45 cm x 2.59 cm x 3.08 cm    5.4 x 1.8 x 2.6 cm cyst has simple appearance by ultrasound  LEFT OVARY:  Not visualized because of bowel gas  CUL-DE-SAC:  Normal.  No fluid or mass.    OTHER:  Negative.                        Impression   CONCLUSION:  Stable uterine fibroids, sizable, the largest of which measures up to 7.3 cm.  Measurements given above.  Right ovarian cyst appears simple by ultrasound 5.4 cm maximum dimension.  No pelvic free fluid.      Discussed medications options to control bleeding, patient desires definitive treatment, no seeking fertility, will schedule CHANTEL, BS. Risk of bleeding, injury to internal organs, infection    Assessment & Plan:  Diagnoses and all orders for this visit:    Abnormal uterine bleeding (AUB)    Uterine leiomyoma, unspecified location    - will schedule CHANTEL BS

## 2024-06-26 NOTE — PROGRESS NOTES
Pt here for iron infusion.  Arrives Ambulating independently         Modifications in dose or schedule: No     Frequency of blood return and site check throughout administration: Prior to administration   Discharged to Home, Ambulating independently in stable condition.     Outpatient Oncology Care Plan  Problem list:  anemia  Problems related to:    disease/disease progression  Interventions:  administered iron  Expected outcomes:  optimal lab values  Progress towards outcome:  making progress    Education Record    Learner:  Patient  Barriers / Limitations:  None  Method:  Brief focused  Outcome:  Shows understanding  Comments: Pt tolerated infusion. No c/o. VSS. See flowsheet

## 2024-06-26 NOTE — PROGRESS NOTES
Sports Medicine Clinic Note     Subjective:    Chief Complaint: Follow-up left 5th finger mallet deformity.    Date of Injury: 3/7/2024    Interval History: Rubia Ladd is a 44-year-old right-hand dominant female who presents for a follow-up on her mallet injury to the left little finger sustained on 3/7/24. The patient reports continued improvement since the last visit. She has been compliant with her occupational therapy and home exercise program. She notes less pain. She reports completing her exercises consistently and protecting the DIP joint during activities.    Objective:    Left Hand Examination:    Inspection:  - Improved swelling at the distal interphalangeal joint.  - Surrounding skin appears normal with no significant lesions or abnormalities.    Palpation:  - No tenderness over the DIP joint.  - No palpable defects noted.    Range of Motion:  - Active range of motion (AROM) for the left small finger:  - Metacarpophalangeal (MP) joint: 0-85 degrees  - Proximal interphalangeal (PIP) joint: 0-75 degrees  - Distal interphalangeal (DIP) joint: 10-50 degrees    Neurovascular:  - Fingertips are pink with good capillary refill and normal temperature.  - No areas of ischemia or ulcers.    Diagnostic Tests:    No new imaging.    Assessment:    Mallet deformity of the left little finger with continued healing and improvement in range of motion and pain.    Plan:    Additional Imaging/Workup: No further imaging required at this time unless significant changes or setbacks occur.    Therapy: Continue with occupational therapy, focusing on protection and gradual mobilization of the affected digit. Maintain the home exercise program with AROM/AAROM/PROM stretching for the DIP and PIP joints. Use pulsed ultrasound and manual therapy as needed.     Medications: Continue acetaminophen and/or NSAIDs as needed for pain management.    Bracing/Casting: Continue with the current splinting protocol, wearing the  extension splint at night for another 3-4 weeks.    Activity Recommendations: Advise against using the affected finger for gripping or heavy lifting. Encourage gentle range of motion exercises for other joints of the finger. Avoid activities that exacerbate pain.    Follow-Up: Schedule a follow-up visit in 3-4 weeks to assess further progress and adjust the treatment plan as necessary. Instruct the patient to return earlier if symptoms worsen or new symptoms develop.       Santi Dunham DO, CAQSM   Primary Care Sports Medicine    Department of Orthopaedic Surgery  [facility]    [address] [place] [id number]  [address] [place] [id number]    t: [phone]  f: [phone]      [facility]

## 2024-06-27 NOTE — PROGRESS NOTES
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 Some achiness after stretching but 4/10 rated achiness at times. Saw physician yesterday and completing infusions this week.      Objective:     CIRCUMFERENTIAL EDEMA (cm):  D5 (Small Finger): DIP: R=4.1cm; L=3.9cm     AROM (degrees): Patient BUE AROM for wrist and digits WFL except noted below     LEFT HAND:     Thumb IF MF RF SF   MP 0-50 0-85 0-85 0-85 0-85   PIP IP=0-60 0-90 0-90 0-90 0-90   DIP   0-65 0-65 0-65 10-50   RAMON 110 240 240 240 200      Assessment: Patient demonstrating improvements in L small finger DIP and PIP flexion comparative to a few days ago on evaluation. Increased swelling/edema to DIP joint is causing slight extension lag that improves after stretching. Patient provided with treatment including pulsed ultrasound, AROM/AAROM/PROM stretching. Patient encouraged to continue with consistent ROM exercise completion and protection of DIP joint during activity until stiffness improves.      Goals:  (to be met in 8 visits)   1. Patient will demonstrate full AROM RAMON for L small finger indicating increased ability to manage functional ADL/IADLs.  2. Patient will demonstrate independence with swelling management techniques indicating increased ability to manage functional ADL/IADLs.  3. Patient will demonstrate at least 30.0 lbs or greater functional LUE  strength indicating increased ability to manage functional ADL./IADLs.  4. Patient will be independent and compliant with comprehensive HEP to maintain progress achieved in OT.     Plan: Patient will continue to be seen for 2x/week or a total of 8 visits over a 90 day  period.  Treatment will include: Manual Therapy, Neuromuscular Re-education, Self-Care Home Management, Therapeutic Activities, Therapeutic Exercise, and Home Exercise Program instruction, Splinting, Modalities PRN    Date: 6/17/2024  TX#: 2/8 Date: 6/20/2024            TX#: 3/8 Date: 6/24/2024            TX#: 4/8 Date: 6/27/2024             TX#: 5/8   -5 min hot pack to L hand  -Ultrasound treatment to dorsal L small finger DIP area w/ settings 3.3 MHz, 1.2 W/cm2, 50% duty for 8 min  -AROM/AAROM/PROM  stretching for L small finger DIP and PIP flexion and extension  -Composite hook fist and full fist stretching  -I-Strip Kinesiotape Support to L small finger EDP 50% stretch  -I-Strip Kinesiotape Support to L small finger FDP 50% stretch  -Review of HEP -5 min hot pack to L hand  -Ultrasound treatment to dorsal L small finger DIP area w/ settings 3.3 MHz, 1.2 W/cm2, 50% duty for 8 min  -AROM/AAROM/PROM  stretching for L small finger DIP and PIP flexion and extension  -Composite hook fist and full fist stretching  -I-Strip Kinesiotape Support to L small finger EDP 50% stretch  -I-Strip Kinesiotape Support to L small finger FDP 50% stretch -5 min hot pack to L hand  -Ultrasound treatment to dorsal L small finger DIP area w/ settings 3.3 MHz, 1.2 W/cm2, 50% duty for 8 min  -AROM/AAROM/PROM  stretching for L small finger DIP and PIP flexion and extension  -Composite hook fist and full fist stretching  -Yellow Theraputty Exercises  -Pink Sponge Squeeze Exercises -5 min hot pack to L hand  -Ultrasound treatment to dorsal L small finger DIP area w/ settings 3.3 MHz, 1.2 W/cm2, 50% duty for 8 min  -AROM/AAROM/PROM  stretching for L small finger DIP and PIP flexion and extension  -Composite hook fist and full fist stretching  -Manhajaa Enrrique In-Hand Manipulation sorting and D5 opposition exercise     HEP:   -Tendon Gliding Exercises  -Flexion Joint Blocking Exercises DIP/PIP Exercises  -Digit Extension Exercises  -Yellow  Theraputty Exercises    Charges: USx1, TEx2       Total Timed Treatment: 45 min  Total Treatment Time: 45 min

## 2024-06-28 NOTE — PROGRESS NOTES
Education Record    Learner:  Patient    Disease / Diagnosis:pt here for ferrlecit    Barriers / Limitations:  None    Method:  Brief focused, printed material and  reinforcement    General Topics:  Plan of care reviewed    Outcome:pt tolerated infusion with no c/o

## 2024-07-01 NOTE — PROGRESS NOTES
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 Some improvements in L small finger overall. Tried anti-inflammatory cream to varying success to L small finger. Pain only with stretching rated 5/10 at times.      Objective:     CIRCUMFERENTIAL EDEMA (cm):  D5 (Small Finger): DIP: R=4.1cm; L=3.9cm     AROM (degrees): Patient BUE AROM for wrist and digits WFL except noted below     LEFT HAND:     Thumb IF MF RF SF   MP 0-50 0-85 0-85 0-85 0-85   PIP IP=0-60 0-90 0-90 0-90 0-90   DIP   0-65 0-65 0-65 10-55   RAMON 110 240 240 240 220       Strength: R=62.4 lbs; L=39.8 lbs    Assessment: Patient demonstrating improvements in L small finger DIP and PIP flexion comparative to a few days ago on evaluation. Increased swelling/edema to DIP joint is causing slight extension lag that improves after stretching. Patient provided with treatment including pulsed ultrasound, AROM/AAROM/PROM stretching. Patient encouraged to continue with consistent ROM exercise completion and protection of DIP joint during activity until stiffness improves.      Goals:  (to be met in 8 visits)   1. Patient will demonstrate full AROM RAMON for L small finger indicating increased ability to manage functional ADL/IADLs.  2. Patient will demonstrate independence with swelling management techniques indicating increased ability to manage functional ADL/IADLs.  3. Patient will demonstrate at least 30.0 lbs or greater functional LUE  strength indicating increased ability to manage functional ADL./IADLs.  4. Patient will be independent and compliant with comprehensive HEP to maintain progress achieved in OT.     Plan:  Patient will continue to be seen for 2x/week or a total of 8 visits over a 90 day period.  Treatment will include: Manual Therapy, Neuromuscular Re-education, Self-Care Home Management, Therapeutic Activities, Therapeutic Exercise, and Home Exercise Program instruction, Splinting, Modalities PRN    Date: 6/17/2024  TX#: 2/8 Date: 6/20/2024            TX#: 3/8 Date: 6/24/2024            TX#: 4/8 Date: 6/27/2024             TX#: 5/8 Date: 7/1/2024  TX#: 6/8   -5 min hot pack to L hand  -Ultrasound treatment to dorsal L small finger DIP area w/ settings 3.3 MHz, 1.2 W/cm2, 50% duty for 8 min  -AROM/AAROM/PROM  stretching for L small finger DIP and PIP flexion and extension  -Composite hook fist and full fist stretching  -I-Strip Kinesiotape Support to L small finger EDP 50% stretch  -I-Strip Kinesiotape Support to L small finger FDP 50% stretch  -Review of HEP -5 min hot pack to L hand  -Ultrasound treatment to dorsal L small finger DIP area w/ settings 3.3 MHz, 1.2 W/cm2, 50% duty for 8 min  -AROM/AAROM/PROM  stretching for L small finger DIP and PIP flexion and extension  -Composite hook fist and full fist stretching  -I-Strip Kinesiotape Support to L small finger EDP 50% stretch  -I-Strip Kinesiotape Support to L small finger FDP 50% stretch -5 min hot pack to L hand  -Ultrasound treatment to dorsal L small finger DIP area w/ settings 3.3 MHz, 1.2 W/cm2, 50% duty for 8 min  -AROM/AAROM/PROM  stretching for L small finger DIP and PIP flexion and extension  -Composite hook fist and full fist stretching  -Yellow Theraputty Exercises  -Pink Sponge Squeeze Exercises -5 min hot pack to L hand  -Ultrasound treatment to dorsal L small finger DIP area w/ settings 3.3 MHz, 1.2 W/cm2, 50% duty for 8 min  -AROM/AAROM/PROM  stretching for L small finger DIP and PIP flexion and extension  -Composite hook fist and full fist stretching  -Manjose maria Osuna In-Hand Manipulation sorting and D5 opposition exercise -5 min hot pack to L  hand  -Ultrasound treatment to dorsal L small finger DIP area w/ settings 3.3 MHz, 1.2 W/cm2, 50% duty for 8 min  -AROM/AAROM/PROM  stretching for L small finger DIP and PIP flexion and extension  -Composite hook fist and full fist stretching  -Re-Assessment of Objective Measures  -I-Strip Kinesiotape Support to L small finger EDP 50% stretch  -I-Strip Kinesiotape Support to L small finger FDP 50% stretch     HEP:   -Tendon Gliding Exercises  -Flexion Joint Blocking Exercises DIP/PIP Exercises  -Digit Extension Exercises  -Yellow Theraputty Exercises    Charges: USx1, TEx2       Total Timed Treatment: 45 min  Total Treatment Time: 45 min

## 2024-07-01 NOTE — PROGRESS NOTES
Education Record    Learner:  Patient    Disease / Diagnosis: patient  here for ferrlecit infusion 4 of 4    Barriers / Limitations:  None    Method:  Brief focused, printed material and  reinforcement    General Topics:  Plan of care reviewed    Outcome:  Shows understanding   Patient  tolerated infusion, no c/o. Dc'd home in stable condition.

## 2024-07-05 NOTE — ANESTHESIA POSTPROCEDURE EVALUATION
[facility]    Rubia Ladd Patient Status:  Outpatient in a Bed   Age/Gender 44 year old female MRN [id number]   Location [facility] POST ANESTHESIA CARE UNIT Attending Tiny Zee DO   Hosp Day # 0 PCP Christina Loredo DO       Anesthesia Post-op Note    TOTAL ABDOMINAL HYSTERECTOMY, BILATERAL SALPINGECTOMY, INTRAOPERATIVE SURGICAL CONSULT, SIGMOIDOSCOPY    Procedure Summary       Date: 07/05/24 Room / Location:  MAIN OR 10 / [facility] MAIN OR    Anesthesia Start: 1319 Anesthesia Stop: 1652    Procedure: TOTAL ABDOMINAL HYSTERECTOMY, BILATERAL SALPINGECTOMY, INTRAOPERATIVE SURGICAL CONSULT, SIGMOIDOSCOPY (Bilateral: Abdomen) Diagnosis:       Abnormal uterine bleeding (AUB)      Uterine leiomyoma, unspecified location      (Abnormal uterine bleeding (AUB) [N93.9]Uterine leiomyoma, unspecified location [D25.9])    Surgeons: Tiny Zee DO Anesthesiologist: Tammy Garcia MD    Anesthesia Type: general ASA Status: 2            Anesthesia Type: general    Vitals Value Taken Time   /81 07/05/24 1652   Temp 98.7 07/05/24 1654   Pulse 82 07/05/24 1654   Resp 22 07/05/24 1654   SpO2 100 % 07/05/24 1654   Vitals shown include unfiled device data.    Patient Location: PACU    Anesthesia Type: general    Airway Patency: patent    Postop Pain Control: adequate    Mental Status: preanesthetic baseline    Nausea/Vomiting: none    Cardiopulmonary/Hydration status: stable euvolemic    Complications: no apparent anesthesia related complications    Postop vital signs: stable    Dental Exam: Unchanged from Preop    Patient to be discharged from PACU when criteria met.

## 2024-07-05 NOTE — ANESTHESIA PROCEDURE NOTES
Regional Block    Date/Time: 7/5/2024 4:35 PM    Performed by: Tammy Garcia MD  Authorized by: Tammy Garcia MD      General Information and Staff    Start Time:  7/5/2024 4:35 PM  End Time:  7/5/2024 4:40 PM  Anesthesiologist:  Tammy Garcia MD  Performed by:  Anesthesiologist  Patient Location:  OR      Site Identification: real time ultrasound guided and image stored and retrievable    Block site/laterality marked before start: site marked  Reason for Block: at surgeon's request and post-op pain management    Preanesthetic Checklist: 2 patient identifers, IV checked, risks and benefits discussed, monitors and equipment checked, pre-op evaluation, timeout performed, anesthesia consent, sterile technique used, no prohibitive neurological deficits and no local skin infection at insertion site      Procedure Details    Patient Position:  Supine  Prep: ChloraPrep    Monitoring:  Cardiac monitor, continuous pulse ox and blood pressure cuff  Block Type:  TAP  Laterality:  Bilateral  Injection Technique:  Single-shot    Needle    Needle Type:  Short-bevel and echogenic  Needle Localization:  Ultrasound guidance  Reason for Ultrasound Use: appropriate spread of the medication was noted in real time and no ultrasound evidence of intravascular and/or intraneural injection            Assessment    Injection Assessment:  Good spread noted, negative resistance, negative aspiration for heme, incremental injection and low pressure  Heart Rate Change: No    - Patient tolerated block procedure well without evidence of immediate block related complications.     Medications  7/5/2024 4:35 PM      Additional Comments    Medication:  Bupivacaine 0.25% 30mL each side

## 2024-07-05 NOTE — BRIEF OP NOTE
Pre-Operative Diagnosis: Abnormal uterine bleeding (AUB) [N93.9]  Uterine leiomyoma, unspecified location [D25.9]     Post-Operative Diagnosis: Abnormal uterine bleeding (AUB) [N93.9]Uterine leiomyoma, unspecified location [D25.9]      Procedure Performed:   TOTAL ABDOMINAL HYSTERECTOMY, BILATERAL SALPINGECTOMY, INTRAOPERATIVE SURGICAL CONSULT, SIGMOIDOSCOPY    Surgeons and Role:     * Tiny Zee DO - Primary     * Emerald Avilez MD - Assisting Surgeon     * Alden Abraham MD - Assisting Surgeon     * Lars Olvera MD - Assisting Surgeon    Assistant(s):        Surgical Findings: enlarged fibroid uterus, right hydrosalpinx, left adnexa adhesion to rectum     Specimen: uterus,cervix, bilateral      Estimated Blood Loss: Blood Output: 500 mL (7/5/2024  4:41 PM)      Dictation Number:      Tiny Zee DO  7/5/2024  4:48 PM

## 2024-07-05 NOTE — ANESTHESIA PROCEDURE NOTES
Airway  Date/Time: 7/5/2024 1:25 PM  Urgency: elective      General Information and Staff    Patient location during procedure: OR  Anesthesiologist: Tammy Garcia MD  Performed: anesthesiologist   Performed by: Tammy Garcia MD  Authorized by: Tammy Garcia MD      Indications and Patient Condition  Indications for airway management: anesthesia  Sedation level: deep  Preoxygenated: yes  Patient position: sniffing  Mask difficulty assessment: 1 - vent by mask    Final Airway Details  Final airway type: endotracheal airway      Successful airway: ETT  Cuffed: yes   Successful intubation technique: direct laryngoscopy  Endotracheal tube insertion site: oral  Blade: Cheryl  Blade size: #3  ETT size (mm): 7.0    Cormack-Lehane Classification: grade I - full view of glottis  Placement verified by: capnometry   Measured from: lips  ETT to lips (cm): 21  Number of attempts at approach: 1

## 2024-07-05 NOTE — H&P
Rubia Ladd is a 44 year old female  Patient's last menstrual period was 2024 (exact date). No chief complaint on file.  .  Patient has h/o long heavy menses. She had her period and then started heavy bleeding again 1 week later. Patient went to ED 24 and her Hb was 6.3, she received 1 unit of PRBCs and started on provera,  OBSTETRICS HISTORY:  OB History    Para Term  AB Living   1 1 1     1   SAB IAB Ectopic Multiple Live Births           1      # Outcome Date GA Lbr Shyam/2nd Weight Sex Type Anes PTL Lv   1 Term 99 40w0d  7 lb 3 oz (3.26 kg) F NORMAL SPONT   BEHZAD       GYNE HISTORY:  Periods irregular heavy    History   Sexual Activity    Sexual activity: Yes    Partners: Male                 MEDICAL HISTORY:  Past Medical History:    Anal condyloma    nausea post operatively    Asthma (HCC)    exercise induced as a child-no issues as an adult    H/O pelvic ultrasound    CONCLUSION:  Stable uterine fibroids, sizable, the largest of which measures up to 7.3 cm.  Measurements given above.  Right ovarian cyst appears simple by ultrasound 5.4 cm maximum dimension.  No pelvic free fluid.    History of urinary reflux    PONV (postoperative nausea and vomiting)    Problems with swallowing    can't swallow pills    Ruptured ovarian cyst    Visual impairment       SURGICAL HISTORY:  Past Surgical History:   Procedure Laterality Date      8/3/99    Other surgical history      bladder surgery for reflux    Other surgical history      rupture ovarian cyst with bleeding from fallopian tube-still has both ovaries    Other surgical history  2024    ANAL EXAM UNDER ANESTHESIA, EXCISION OF RECTAL MASS, EXCISION AND FULGURATION OF PERIANAL CONDYLOMA       SOCIAL HISTORY:  Social History     Socioeconomic History    Marital status:      Spouse name: Not on file    Number of children: Not on file    Years of education: Not on file    Highest education level: Not on file    Occupational History    Not on file   Tobacco Use    Smoking status: Never     Passive exposure: Never    Smokeless tobacco: Never   Vaping Use    Vaping status: Never Used   Substance and Sexual Activity    Alcohol use: Never    Drug use: Never    Sexual activity: Yes     Partners: Male   Other Topics Concern     Service No    Blood Transfusions No    Caffeine Concern No    Occupational Exposure No    Hobby Hazards No    Sleep Concern No    Stress Concern No    Weight Concern No    Special Diet No    Back Care No    Exercise No    Bike Helmet No    Seat Belt No    Self-Exams No   Social History Narrative    Not on file     Social Determinants of Health     Financial Resource Strain: Not on file   Food Insecurity: Not on file   Transportation Needs: Not on file   Physical Activity: Not on file   Stress: Not on file   Social Connections: Not on file   Housing Stability: Not on file       FAMILY HISTORY:  Family History   Problem Relation Age of Onset    No Known Problems Father     Other (ovarian cysts) Mother     Alcohol abuse Mother     No Known Problems Daughter     Hypertension Maternal Grandmother     No Known Problems Maternal Grandfather     No Known Problems Paternal Grandmother     No Known Problems Paternal Grandfather     Uterine Cancer Sister         dx age 30s    Pancreatic Cancer Neg     Colon Cancer Neg     Prostate Cancer Neg     Ovarian Cancer Neg     Breast Cancer Neg     Cancer Neg     Endometriosis Neg     Infertility Neg        MEDICATIONS:  No current outpatient medications on file.    ALLERGIES:    Allergies   Allergen Reactions    Morphine NAUSEA AND VOMITING         Review of Systems:  Constitutional:  Denies fatigue, night sweats, hot flashes  Eyes:  denies blurred or double vision  Cardiovascular:  denies chest pain or palpitations  Respiratory:  denies shortness of breath  Gastrointestinal:  denies heartburn, abdominal pain, diarrhea or constipation  Genitourinary:  denies  dysuria, incontinence, abnormal vaginal discharge, vaginal itching  Musculoskeletal:  denies back pain.  Skin/Breast:  Denies any breast pain, lumps, or discharge.   Neurological:  denies headaches, extremity weakness or numbness.  Psychiatric: denies depression or anxiety.  Endocrine:   denies excessive thirst or urination.  Heme/Lymph:  denies history of anemia, easy bruising or bleeding.      PHYSICAL EXAM:   Constitutional: well developed, well nourished  Head/Face: normocephalic  Neck/Thyroid: thyroid symmetric, no thyromegaly, no nodules, no adenopathy  Lymphatic:no abnormal supraclavicular or axillary adenopathy is noted  Breast: normal without palpable masses, tenderness, asymmetry, nipple discharge, nipple retraction or skin changes  Abdomen:  soft, nontender, nondistended, no masses  Skin/Hair: no unusual rashes or bruises  Extremities: no edema, no cyanosis  Psychiatric:  Oriented to time, place, person and situation. Appropriate mood and affect    Pelvic Exam:  External Genitalia: normal appearance, hair distribution, and no lesions  Urethral Meatus:  normal in size, location, without lesions and prolapse  Bladder:  No fullness, masses or tenderness  Vagina:  Normal appearance without lesions, no abnormal discharge  Cervix:  Normal without tenderness on motion  Uterus: enlarged   Adnexa: normal without masses or tenderness  Perineum: normal  Anus: no hemorroids     FINDINGS:                UTERUS:  11.09 cm x 5.48 cm x 12.04 cm    Endometrium Thickness: 1.30 cm    Multiple uterine fibroids.  Right fundal fibroid 7.3 x 6.2 cm.  Left fundal fibroid 5.5 x 4.3 cm.  Left body fibroid 4.7 x 4.3 cm.  Right body fibroid 3.0 x 3.0 cm.  Fibroid in the cervical region 4.7 x 5.6 cm.  RIGHT OVARY:  5.45 cm x 2.59 cm x 3.08 cm    5.4 x 1.8 x 2.6 cm cyst has simple appearance by ultrasound  LEFT OVARY:  Not visualized because of bowel gas  CUL-DE-SAC:  Normal.  No fluid or mass.    OTHER:  Negative.                         Impression   CONCLUSION:  Stable uterine fibroids, sizable, the largest of which measures up to 7.3 cm.  Measurements given above.  Right ovarian cyst appears simple by ultrasound 5.4 cm maximum dimension.  No pelvic free fluid.       Assessment & Plan:  Uterine fibroids   Anemia  - CHANTEL BS

## 2024-07-05 NOTE — OPERATIVE REPORT
[facility]  Operative Note    Rubia Ladd Location: OR   CSN [id number] MRN [id number]    3/29/1980 Age 44 year old   Admission Date 2024 Operation Date 2024   Attending Physician Tiny Zee DO Operating Physician Lars Olvera MD   PCP Christina Loredo DO          Patient Name: Rubia Ladd    Preoperative Diagnosis: Rule out rectal injury    Postoperative Diagnosis: No evidence of any significant rectal injury    Primary Surgeon: Lars Olvera MD    Assistant: Alden Abraham MD, Tiny Zee DO    Anesthesia: General    Procedures: Exploratory laparotomy, rectal mobilization and control of pelvic bleeding    Implants: None    Specimen: None    Drains: None    Estimated Blood Loss: 10 cc for my portion of the procedure    Complications: None immediate    Condition: Stable    Indications for Surgery:   Rubia Ladd is a 44 year old female who is undergoing elective total abdominal hysterectomy for uterine fibroids, abnormal vaginal bleeding and anemia by Dr. Zee this afternoon.  After completion of the hysterectomy, Dr. Zee had concern for a possible rectal injury and requested general surgery consultation.  Patient was positioned supine, in Trendelenburg and under general endotracheal anesthesia upon my arrival to the operating room.  She had a lower midline incision with retractor in place.  I was unable to obtain consent for my portion of the procedure as this was an emergent intraoperative consultation.    Surgical Findings:   No evidence of any significant or full-thickness rectal injury.    Description of Procedure:   I began by exploring the distal sigmoid colon and rectum.  There was a tear of the soft tissue along the left anterior mid-rectum.  It was somewhat difficult to assess if this was peritoneum with pericolonic fat or a true full-thickness rectal injury.  I began by mobilizing the rectum along the avascular TME plane along the right side  of the pelvic peritoneum using electrocautery while a flexible sigmoidoscope was being set up.  The patient was positioned frog-leg.  My partner, Dr. Abraham, then performed a digital rectal exam and flexible sigmoidoscopy.  Please see his operative report for details of the flexible sigmoidoscopy.    I clamped the rectosigmoid colon with my fingers while Dr. Abraham performed the flexible sigmoidoscopy.  There was no evidence of mucosal injury or bleeding on flexible sigmoidoscopy.  The rectum appropriately insufflated.  I filled the pelvis with warm saline solution and confirmed there was no leak or bubbling of air.  It appeared that the area of possible injury was the peritoneum and pericolonic fat adjacent to the left anterior rectum.  The rectum itself appeared pink and healthy without any evidence of significant injury.  I was able to control any small vessel bleeding within the pelvis using electrocautery.  The pelvis was irrigated with warm saline solution and appeared hemostatic aside from some minor oozing from the vaginal cuff suture line.  The operation was then turned back over to Dr. Zee.  Please see her operative report for any details of the surgery before or after my involvement.  This concluded my portion of the procedure.    Lars Olvera MD  7/5/2024  4:24 PM

## 2024-07-05 NOTE — ANESTHESIA PREPROCEDURE EVALUATION
PRE-OP EVALUATION    Patient Name: Rubia Ladd    Admit Diagnosis: Abnormal uterine bleeding (AUB) [N93.9]  Uterine leiomyoma, unspecified location [D25.9]    Pre-op Diagnosis: Abnormal uterine bleeding (AUB) [N93.9]  Uterine leiomyoma, unspecified location [D25.9]    TOTAL ABDOMINAL HYSTERECTOMY, BILATERAL SALPINGECTOMY    Anesthesia Procedure: TOTAL ABDOMINAL HYSTERECTOMY, BILATERAL SALPINGECTOMY (Bilateral)    Surgeons and Role:     * Tiny Zee DO - Primary     * Emerald Avilez MD - Assisting Surgeon    Pre-op vitals reviewed.  Temp: 98.5 °F (36.9 °C)  Pulse: 95  Resp: 16  BP: 164/96  SpO2: 99 %  Body mass index is 35.12 kg/m².    Current medications reviewed.  Hospital Medications:   [Transfer Hold] acetaminophen (Tylenol Extra Strength) tab 1,000 mg  1,000 mg Oral Once    lactated ringers infusion   Intravenous Continuous    [COMPLETED] heparin (Porcine) 5000 UNIT/ML injection 5,000 Units  5,000 Units Subcutaneous Once    ceFAZolin (Ancef) 2g in 10mL IV syringe premix  2 g Intravenous Once       Outpatient Medications:     Medications Prior to Admission   Medication Sig Dispense Refill Last Dose    medroxyPROGESTERone Acetate (PROVERA) 10 MG Oral Tab Take 1 tablet (10 mg total) by mouth As Directed. Take 1 tablet by mouth in the morning and at bedtime for 7 days then take 1 tablet daily for 7 days. 21 tablet 0     ondansetron 4 MG Oral Tablet Dispersible Take 1 tablet (4 mg total) by mouth every 4 (four) hours as needed for Nausea. 10 tablet 0 Past Month    diclofenac (VOLTAREN) 1 % External Gel Apply 4 g topically every 6 (six) hours as needed. 1 each 2     acetaminophen-codeine 120-12 MG/5ML Oral Solution Take 5 mL by mouth every 6 (six) hours as needed for Pain. (Patient not taking: Reported on 5/29/2024) 90 mL 0        Allergies: Morphine      Anesthesia Evaluation    Patient summary reviewed.    Anesthetic Complications  (+) history of anesthetic complications  History of: PONV        GI/Hepatic/Renal    Negative GI/hepatic/renal ROS.                             Cardiovascular            MET: >4    (+) obesity                         (-) angina     (-) TRIANA         Endo/Other    Negative endo/other ROS.                              Pulmonary      (+) asthma                     Neuro/Psych    Negative neuro/psych ROS.                                  Past Surgical History:   Procedure Laterality Date      8/3/99    Other surgical history      bladder surgery for reflux    Other surgical history      rupture ovarian cyst with bleeding from fallopian tube-still has both ovaries    Other surgical history  2024    ANAL EXAM UNDER ANESTHESIA, EXCISION OF RECTAL MASS, EXCISION AND FULGURATION OF PERIANAL CONDYLOMA     Social History     Socioeconomic History    Marital status:    Tobacco Use    Smoking status: Never     Passive exposure: Never    Smokeless tobacco: Never   Vaping Use    Vaping status: Never Used   Substance and Sexual Activity    Alcohol use: Never    Drug use: Never    Sexual activity: Yes     Partners: Male   Other Topics Concern     Service No    Blood Transfusions No    Caffeine Concern No    Occupational Exposure No    Hobby Hazards No    Sleep Concern No    Stress Concern No    Weight Concern No    Special Diet No    Back Care No    Exercise No    Bike Helmet No    Seat Belt No    Self-Exams No     History   Drug Use Unknown     Available pre-op labs reviewed.  Lab Results   Component Value Date    WBC 5.6 2024    RBC 3.17 (L) 2024    HGB 9.3 (L) 2024    HCT 24.2 (L) 2024    MCV 76.3 (L) 2024    MCH 23.0 (L) 2024    MCHC 30.2 (L) 2024    RDW 15.3 (H) 2024    .0 2024     Lab Results   Component Value Date     2024    K 4.0 2024     2024    CO2 23.0 2024    BUN 13 2024    CREATSERUM 0.90 2024     (H) 2024    CA 8.0 (L) 2024             Airway      Mallampati: II  Mouth opening: >3 FB  TM distance: > 6 cm  Neck ROM: full Cardiovascular    Cardiovascular exam normal.         Dental    Dentition appears grossly intact         Pulmonary    Pulmonary exam normal.                 Other findings              ASA: 2   Plan: general  NPO status verified and patient meets guidelines.    Post-procedure pain management plan discussed with surgeon and patient.    Comment: Discussed risks including PONV, sore throat, dental damage, cardiac and respiratory complications. Discussed TAP blocks. All questions answered.  Plan/risks discussed with: patient  Use of blood product(s) discussed with: patient              Present on Admission:  **None**

## 2024-07-06 NOTE — CONSULTS
[facility] HOSPITALIST  CONSULT     Rubia Ladd Patient Status:  Outpatient in a Bed    3/29/1980 MRN [id number]   Location [facility] 3NW-A Attending Tiny Zee,    Hosp Day # 0 PCP Christina Loredo DO     Reason for consult: Medical management    Requested by: Dr. Zee     Subjective:   History of Present Illness:     Rubia Ladd is a 44 year old female with a PMH of asthma, abnormal uterine bleeding who presents for hysterectomy.  We are consulted for medical management of elevated BP.  Patient doing well today, pain is controlled, denies n/v.  No history of HTN.  She has averse reactions to pills and does not want to try any oral medications.  She denies any other acute complaints .     History/Other:    Past Medical History:  Past Medical History:    Anal condyloma    nausea post operatively    Asthma (HCC)    exercise induced as a child-no issues as an adult    H/O pelvic ultrasound    CONCLUSION:  Stable uterine fibroids, sizable, the largest of which measures up to 7.3 cm.  Measurements given above.  Right ovarian cyst appears simple by ultrasound 5.4 cm maximum dimension.  No pelvic free fluid.    History of urinary reflux    PONV (postoperative nausea and vomiting)    Problems with swallowing    can't swallow pills    Ruptured ovarian cyst    Visual impairment     Past Surgical History:   Past Surgical History:   Procedure Laterality Date      8/3/99    Other surgical history      bladder surgery for reflux    Other surgical history      rupture ovarian cyst with bleeding from fallopian tube-still has both ovaries    Other surgical history  2024    ANAL EXAM UNDER ANESTHESIA, EXCISION OF RECTAL MASS, EXCISION AND FULGURATION OF PERIANAL CONDYLOMA      Family History:   Family History   Problem Relation Age of Onset    No Known Problems Father     Other (ovarian cysts) Mother     Alcohol abuse Mother     No Known Problems Daughter     Hypertension Maternal  Grandmother     No Known Problems Maternal Grandfather     No Known Problems Paternal Grandmother     No Known Problems Paternal Grandfather     Uterine Cancer Sister         dx age 30s    Pancreatic Cancer Neg     Colon Cancer Neg     Prostate Cancer Neg     Ovarian Cancer Neg     Breast Cancer Neg     Cancer Neg     Endometriosis Neg     Infertility Neg      Social History:    reports that she has never smoked. She has never been exposed to tobacco smoke. She has never used smokeless tobacco. She reports that she does not drink alcohol and does not use drugs.     Allergies:   Allergies   Allergen Reactions    Morphine NAUSEA AND VOMITING       Medications:    Current Facility-Administered Medications on File Prior to Encounter   Medication Dose Route Frequency Provider Last Rate Last Admin    [COMPLETED] sodium chloride 0.9% infusion 1,000 mL  1,000 mL Intravenous Once Kavitha Guerra APRN 1,000 mL/hr at 05/31/24 1621 1,000 mL at 05/31/24 1621    [COMPLETED] medroxyPROGESTERone Acetate (Provera) tab 20 mg  20 mg Oral Once Arlene Aragon DO   20 mg at 05/31/24 2121    [COMPLETED] tranexamic acid in sodium chloride 0.7% (Cyklokapron) 1000 mg/100mL infusion premix 1,000 mg  1,000 mg Intravenous Once Arlene Aragon DO   Stopped at 05/31/24 2013    [COMPLETED] heparin (Porcine) 5000 UNIT/ML injection 5,000 Units  5,000 Units Subcutaneous Once Lars Olvera MD   5,000 Units at 05/24/24 0607    [COMPLETED] ceFAZolin (Ancef) 2g in 10mL IV syringe premix  2 g Intravenous Once Lars Olvera MD   2 g at 05/24/24 0826    And    [COMPLETED] metRONIDAZOLE in sodium chloride 0.79% (Flagyl) 5 mg/mL IVPB premix 500 mg  500 mg Intravenous Once Lars Olvera MD   500 mg at 05/24/24 0826     Current Outpatient Medications on File Prior to Encounter   Medication Sig Dispense Refill    medroxyPROGESTERone Acetate (PROVERA) 10 MG Oral Tab Take 1 tablet (10 mg total) by mouth As Directed. Take 1 tablet by mouth in the morning  and at bedtime for 7 days then take 1 tablet daily for 7 days. 21 tablet 0    ondansetron 4 MG Oral Tablet Dispersible Take 1 tablet (4 mg total) by mouth every 4 (four) hours as needed for Nausea. 10 tablet 0    acetaminophen-codeine 120-12 MG/5ML Oral Solution Take 5 mL by mouth every 6 (six) hours as needed for Pain. (Patient not taking: Reported on 5/29/2024) 90 mL 0       Review of Systems:   A comprehensive review of systems was completed.    Pertinent positives and negatives noted in the HPI.    Objective:   Physical Exam:    /85 (BP Location: Left arm)   Pulse 98   Temp 98.8 °F (37.1 °C) (Oral)   Resp 18   Ht 5' 2\" (1.575 m)   Wt 192 lb (87.1 kg)   LMP 05/16/2024 (Exact Date)   SpO2 100%   BMI 35.12 kg/m²   General: No acute distress, Alert  Respiratory: No rhonchi, no wheezes  Cardiovascular: S1, S2. Regular rate and rhythm  Abdomen: Soft, NT/ND, +BS  Neuro: No new focal deficits  Extremities: No edema    Results:    Labs:      Labs Last 24 Hours:  Recent Labs   Lab 07/05/24  1208 07/06/24  1012   WBC  --  14.3*   HGB 9.3* 7.4*   MCV  --  78.2*   PLT  --  273.0       No results for input(s): \"GLU\", \"BUN\", \"CREATSERUM\", \"GFRAA\", \"GFRNAA\", \"CA\", \"ALB\", \"NA\", \"K\", \"CL\", \"CO2\", \"ALKPHO\", \"AST\", \"ALT\", \"BILT\", \"TP\" in the last 168 hours.    No results for input(s): \"PTP\", \"INR\" in the last 168 hours.    No results for input(s): \"TROP\", \"CK\" in the last 168 hours.      Imaging: Imaging data reviewed in Epic.    Assessment & Plan:      #Abnormal uterine bleeding  #Fibroid Uterus   Post op care per OB/Gyn  Pain control, anti-emetics, CLD, IVF's    #Anemia - iron def  Likely 2/2 above  Hg 7.4, transfuse if < 7    #Rectal injury ruled out  Gen surgery s/p ex lap with flexible sigmoidoscopy    #Post-op HTN  Likely 2/2 fluids, pain, stress from hospital setting  Patient does not want to take pills  Her BP is normal usually per her, follows regularly with PCP  IV hydralazine 5mg q6hrs prn, goal systolic <  160    #Leukocytosis  Afebrile, no cough/dysuria  Likely reactive, monitor     #Obesity  BMI 35.12        Plan of care discussed with patient, family, nurse     Randy Gómez MD  7/6/2024    The 21st Century Cures Act makes medical notes like these available to patients in the interest of transparency. Please be advised this is a medical document. Medical documents are intended to carry relevant information, facts as evident, and the clinical opinion of the practitioner. The medical note is intended as peer to peer communication and may appear blunt or direct. It is written in medical language and may contain abbreviations or verbiage that are unfamiliar.

## 2024-07-06 NOTE — PLAN OF CARE
Pt A&Ox4, resting in bed with  at bedside.  Resp: RA  GI/: now voiding normally after Lau removed this morning  Activity/Safety: up ad chantel  Skin: midline incision to abd with ABDs & tape  Pain: simethicone given for gas pain  Pt updated on and agreeable to POC; gas pain mgmt, monitor BP (hydralazine SBP>160), advance diet.

## 2024-07-06 NOTE — PROGRESS NOTES
POD#1  Patient c/o b/l upper abdomen discomfort, otherwise pain is well controlled, has orange juice this morning.    BP (!) 166/91 (BP Location: Left arm)   Pulse 97   Temp 99 °F (37.2 °C) (Oral)   Resp 18   Ht 62\"   Wt 192 lb (87.1 kg)   LMP 05/16/2024 (Exact Date)   SpO2 96%   BMI 35.12 kg/m²     Recent Labs   Lab 07/05/24  1208   HGB 9.3*       Constitutional: well developed, well nourished  Head/Face: normocephalic  Abdomen:  soft, nontender, mildly distended  Incision: clean dressing in place   Skin/Hair: no unusual rashes or bruises  Extremities: no edema, no cyanosis  Psychiatric:  Oriented to time, place, person and situation. Appropriate mood and affect     A/P: s/p CHANTEL BS  - doing well  - ambulate, remove guadarrama  - advance diet  - discontinue PCA

## 2024-07-07 NOTE — PLAN OF CARE
Written and verbal discharge instructions given to patient ans she verbalize understanding.   No prescription given.  Patient left floor in stable condition via wc, accompanied by staff.

## 2024-07-07 NOTE — PLAN OF CARE
Received pt at 1930. Pt is A&O x 4; follows commands, s/p Hysterectomy, now off PCA pump and voiding since Lau removed earlier today.. Pt is on RA, VSS, low fiber/soft diet. Pt is continent of bowel and bladder. Pt's pain is controlled and ambulates independently. IV access is patent. Shift assessment performed, HS meds given. NOC safety plan in place; bed in low position, call light and personal items within reach, pt encouraged to call staff for assistance.

## 2024-07-07 NOTE — DISCHARGE INSTRUCTIONS
Home Care Instructions Following Your Hysterectomy      Urbia-  We hope you were pleased with your care at [facility].  We wish you the best outcome and overall experience with your operation.  These instructions will help to minimize pain, limit the risk for infection, and improve the likelihood of a successful result.    What to Expect:  Expect some slight vaginal bleeding for 1-2 weeks after your procedure.  Use pads only, No tampons  You may have mild abdominal pain at the incision site for a couple of weeks    Bandages (Dressing)  Keep dressings clean and dry for 2 days  Do not remove your bandages until the third day after your procedure  Do not get your bandages wet for 2 days    Bathing/Showers  You may resume showers in 1 day  No baths, swimming, or hot tubs for 2 weeks    Wound Care  The following instructions will promote proper healing and help to prevent infection  Leave your Steri-Strips (butterfly tapes) in place as long as possible  Do not remove the Steri-Strips  Do not replace the Steri-Strips, if they come off.  If the tapes come off, leave them off and keep incisions clean.  Cover incisions with a clean Bandaid  Itching, bruising, a pulling sensation and or numbness around the incision are all normal    Pain Medication if Prescribed  Norco: Take one or two tablets every four hours as needed for pain if prescribed by your provider  Do not exceed 8 (eight) tablets of Norco each day  Do not take Norco if you do not have pain    Over-The-Counter Medication  Non-prescription anti-inflammatory medications can also help to ease the pain.  You may take Aleve, Tylenol or Ibuprofen   Take as directed on the bottle  Drink a full glass of water with the medication    Home Medication  Resume your home medications as instructed    Diet  Resume your normal diet    Activity  No strenuous activity or heavy lifting for 6 weeks  You may go up and down the stairs as tolerated  No physical exercise, sports,  or gym workouts for 6 weeks  No sexual activity for 6 weeks    Return to Work or School  You may not return to work or school until you are released by your gynecologist  You may return to work or school in 6 weeks if you are released by your gynecologist  No gym class or sports for 6 weeks  Contact your gynecologist's office if you need a medical note    Driving  Do not drive if you are taking prescribed pain medication      Follow-up Appointment with Your Gynecologist  Call your gynecologist's office as soon as possible to schedule a follow up appointment in two weeks    Verify your appointment date, day, time, and location  At your postoperative appointment, your wounds will be evaluated, healing assessed, and any additional concerns and instructions will be discussed    Questions or Concerns  Call the office if you experience severe pain not controlled by pain medication, swelling, heavy bleeding, foul smelling vaginal discharge, shortness of breath, chest pain, leg pain, fever (100.4 or greater), or other concerns  The number is: [phone]  If your call is made after office hours, the physician on call will be available to help you.  There is always a doctor from the group covering our gynecology patients, when your provider is unavailable      Rubia  Thank you for coming to [facility] for your operation.  The nurses and the anesthesiologist try very hard to make sure you receive the best care possible.  Your trust in them as well as us is greatly appreciated.    Thanks so much,  [facility]

## 2024-07-07 NOTE — PROGRESS NOTES
[facility]   part of [facility]     Hospitalist Progress Note     Rubia Ladd Patient Status:  Outpatient in a Bed    3/29/1980 MRN [id number]   Location [facility] 3NW-A Attending Tiny Zee DO   Hosp Day # 0 PCP Christina Loredo DO     Chief Complaint: Pain    Subjective:     Patient is feeling well today.  Denies fever, chills, pain better, had BM, no n/v.      Objective:    Review of Systems:   A comprehensive review of systems was completed; pertinent positive and negatives stated in subjective.    Vital signs:  Temp:  [98.3 °F (36.8 °C)-100.4 °F (38 °C)] 98.3 °F (36.8 °C)  Pulse:  [] 95  Resp:  [16-18] 18  BP: (130-168)/(68-85) 134/81  SpO2:  [99 %-100 %] 100 %    Physical Exam:    General: No acute distress, Alert  Respiratory: No rhonchi, no wheezes  Cardiovascular: S1, S2. Regular rate and rhythm  Abdomen: Soft, NT/ND, +BS  Neuro: No new focal deficits  Extremities: No edema    Diagnostic Data:    Labs:  Recent Labs   Lab 24  1208 24  1012 24  0811   WBC  --  14.3* 11.4*   HGB 9.3* 7.4* 7.1*   MCV  --  78.2* 80.3   PLT  --  273.0 269.0       No results for input(s): \"GLU\", \"BUN\", \"CREATSERUM\", \"GFRAA\", \"GFRNAA\", \"CA\", \"ALB\", \"NA\", \"K\", \"CL\", \"CO2\", \"ALKPHO\", \"AST\", \"ALT\", \"BILT\", \"TP\" in the last 168 hours.    CrCl cannot be calculated (Patient's most recent lab result is older than the maximum 7 days allowed.).    No results for input(s): \"TROP\", \"TROPHS\", \"CK\" in the last 168 hours.    No results for input(s): \"PTP\", \"INR\" in the last 168 hours.               Microbiology    No results found for this visit on 24.      Imaging: Reviewed in Epic.    Medications:     Assessment & Plan:      #Abnormal uterine bleeding  #Fibroid Uterus   Post op care per OB/Gyn  Pain control, anti-emetics, CLD, IVF's     #Anemia - iron def  Likely 2/2 above  Hg 7.4 -> 7.1, transfuse if < 7     #Rectal injury ruled out  Gen surgery s/p ex lap with flexible  sigmoidoscopy     #Post-op HTN  Discussed f/u with PCP  No HTN meds due to no hx, likely due to pain/fluids  She will see her PCP in 2 weeks for BP check      #Leukocytosis  Afebrile, no cough/dysuria  Likely reactive, monitor   Improved, 11.4     #Obesity  BMI 35.12      Randy Gómez MD    Supplementary Documentation:     Quality:  DVT Mechanical Prophylaxis:   SCDs, Early ambuation  DVT Pharmacologic Prophylaxis   Medication   None                Code Status: Not on file  Lau: No urinary catheter in place  Lau Duration (in days):   Central line:    AUGUST: 7/7/2024    Discharge is dependent on: pain  At this point Ms. Ladd is expected to be discharge to: Home    The 21st Century Cures Act makes medical notes like these available to patients in the interest of transparency. Please be advised this is a medical document. Medical documents are intended to carry relevant information, facts as evident, and the clinical opinion of the practitioner. The medical note is intended as peer to peer communication and may appear blunt or direct. It is written in medical language and may contain abbreviations or verbiage that are unfamiliar.

## 2024-07-07 NOTE — DISCHARGE SUMMARY
[facility]   part of [facility]    Discharge Summary    Rubia Ladd Patient Status:  Outpatient in a Bed    3/29/1980 MRN [id number]   Location [facility] 3NW-A Attending Tiny Zee DO   Hosp Day # 0 PCP Christina Loredo DO     Admit Date: 2024    Discharge Date : 2024    Attending Physician:  * Tiny Zee DO - Primary     * Emerald Avilez MD - Assisting Surgeon     * Alden Abraham MD - Assisting Surgeon     * Lars Olvera MD - Assisting Surgeon    Reason for Admission:  Scheduled procedure    Procedures:  TOTAL ABDOMINAL HYSTERECTOMY, BILATERAL SALPINGECTOMY, INTRAOPERATIVE SURGICAL CONSULT, SIGMOIDOSCOPY     Hospital Course: Patient was admitted for planned surgical procedure. Please see op notes for details. Postoperatively, she did well. Due to elevated blood pressures, IM consult was obtained with plans for blood pressure follow up outpatient. On POD#2, pain was well controlled and she was ambulating, voiding, tolerating a general diet and passing gas. She was discharged to home with instructions to follow up for suture removal and postop check.    Vitals:    24 0915   BP: 153/75   Pulse: 104   Resp: 17   Temp: 98.7 °F (37.1 °C)         Constitutional: well developed, well nourished  Abdomen:  soft, nontender, mildly distended  Incision: incision c/d/I with sutures  Extremities: no edema, no cyanosis  Psychiatric:  Oriented to time, place, person and situation. Appropriate mood and affect    Discharge Diagnoses: Diagnoses of Abnormal uterine bleeding (AUB) and Uterine leiomyoma, unspecified location were pertinent to this visit.    Discharged Condition: good    Disposition: home      Patient Instructions:  Follow-up appointment with Dr. Saadia Armendariz MD  2024  1:29 PM

## 2024-07-12 NOTE — PROGRESS NOTES
Rubia Ladd is a 44 year old female  Patient's last menstrual period was 2024 (exact date).   Chief Complaint   Patient presents with    Post-Op     OTAL ABDOMINAL HYSTERECTOMY, BILATERAL SALPINGECTOMY, INTRAOPERATIVE SURGICAL CONSULT, SIGMOIDOSCOPY.     Other     Stables removal.   .  Patient has no compliants, pain is controlled, drove today herself  OBSTETRICS HISTORY:  OB History    Para Term  AB Living   1 1 1     1   SAB IAB Ectopic Multiple Live Births           1      # Outcome Date GA Lbr Shyam/2nd Weight Sex Type Anes PTL Lv   1 Term 99 40w0d  7 lb 3 oz (3.26 kg) F NORMAL SPONT   BEHZAD       GYNE HISTORY:  Periods none due to hysterectomy    History   Sexual Activity    Sexual activity: Not Currently    Partners: Male    Birth control/ protection: Hysterectomy        Pap Result Notes: no pap smear on file        MEDICAL HISTORY:  Past Medical History:    Anal condyloma    nausea post operatively    Asthma (HCC)    exercise induced as a child-no issues as an adult    H/O pelvic ultrasound    CONCLUSION:  Stable uterine fibroids, sizable, the largest of which measures up to 7.3 cm.  Measurements given above.  Right ovarian cyst appears simple by ultrasound 5.4 cm maximum dimension.  No pelvic free fluid.    History of urinary reflux    PONV (postoperative nausea and vomiting)    Problems with swallowing    can't swallow pills    Ruptured ovarian cyst    Visual impairment       SURGICAL HISTORY:  Past Surgical History:   Procedure Laterality Date      8/3/99    Other surgical history      bladder surgery for reflux    Other surgical history      rupture ovarian cyst with bleeding from fallopian tube-still has both ovaries    Other surgical history  2024    ANAL EXAM UNDER ANESTHESIA, EXCISION OF RECTAL MASS, EXCISION AND FULGURATION OF PERIANAL CONDYLOMA       SOCIAL HISTORY:  Social History     Socioeconomic History    Marital status:      Spouse name:  Not on file    Number of children: Not on file    Years of education: Not on file    Highest education level: Not on file   Occupational History    Not on file   Tobacco Use    Smoking status: Never     Passive exposure: Never    Smokeless tobacco: Never   Vaping Use    Vaping status: Never Used   Substance and Sexual Activity    Alcohol use: Never    Drug use: Never    Sexual activity: Not Currently     Partners: Male     Birth control/protection: Hysterectomy   Other Topics Concern     Service No    Blood Transfusions No    Caffeine Concern No    Occupational Exposure No    Hobby Hazards No    Sleep Concern No    Stress Concern No    Weight Concern No    Special Diet No    Back Care No    Exercise No    Bike Helmet No    Seat Belt No    Self-Exams No   Social History Narrative    Not on file     Social Determinants of Health     Financial Resource Strain: Not on file   Food Insecurity: No Food Insecurity (7/5/2024)    Food Insecurity     Food Insecurity: Never true   Transportation Needs: No Transportation Needs (7/5/2024)    Transportation Needs     Lack of Transportation: No     Car Seat: Not on file   Physical Activity: Not on file   Stress: Not on file   Social Connections: Not on file   Housing Stability: Low Risk  (7/5/2024)    Housing Stability     Housing Instability: No     Housing Instability Emergency: Not on file     Crib or Bassinette: Not on file       FAMILY HISTORY:  Family History   Problem Relation Age of Onset    No Known Problems Father     Other (ovarian cysts) Mother     Alcohol abuse Mother     No Known Problems Daughter     Hypertension Maternal Grandmother     No Known Problems Maternal Grandfather     No Known Problems Paternal Grandmother     No Known Problems Paternal Grandfather     Uterine Cancer Sister         dx age 30s    Pancreatic Cancer Neg     Colon Cancer Neg     Prostate Cancer Neg     Ovarian Cancer Neg     Breast Cancer Neg     Cancer Neg     Endometriosis Neg      Infertility Neg        MEDICATIONS:    Current Outpatient Medications:     ibuprofen 100 MG/5ML Oral Suspension, Take 20 mL (400 mg total) by mouth every 4 (four) hours as needed for Pain. (Patient not taking: Reported on 7/12/2024), Disp: , Rfl:     diclofenac (VOLTAREN) 1 % External Gel, Apply 4 g topically every 6 (six) hours as needed. (Patient not taking: Reported on 7/12/2024), Disp: 1 each, Rfl: 2    ondansetron 4 MG Oral Tablet Dispersible, Take 1 tablet (4 mg total) by mouth every 4 (four) hours as needed for Nausea. (Patient not taking: Reported on 7/12/2024), Disp: 10 tablet, Rfl: 0    ALLERGIES:    Allergies   Allergen Reactions    Morphine NAUSEA AND VOMITING         PHYSICAL EXAM:     Abdomen: soft, not tender, not distended  Incision: healing, staples removed, except 2 left at the bottom end of incision - remove in 1 week, steri strips placed     Assessment & Plan:  1. Postop check  - doing well  - return 7/20/24

## 2024-07-17 NOTE — OPERATIVE REPORT
[facility]  Operative Note    Rubia Ladd Location: OR   CSN [id number] MRN [id number]    3/29/1980 Age 44 year old   Admission Date 2024 Operation Date 2024   Attending Physician No att. providers found Operating Physician Alden Abraham MD   PCP Christina Loredo DO          Patient Name: Rubia Ladd    Preoperative Diagnosis: Rectal injury     Postoperative Diagnosis: Normal rectum     Primary Surgeon: Alden Abraham MD    Anesthesia: General    Anesthesiologist: Anesthesiologist.: Tammy Garcia MD    Procedures: Flexible sigmoidoscopy     Specimen: none     Estimated Blood Loss: None     Complications: None immediate    Condition: Good    Indications for Surgery:   Rubia Ladd is a 44 year old female who I am consulted on intraoperatively by dr. Zee for concern of rectal injury during total abdominal hysterectomy. I performed the flexible sigmoidoscopy portion of the procedure to evaluate for any transmural rectal injury.     Surgical Findings:   The quality of the bowel prep was fair.  No signs of rectal injury on endoscopic exam     Description of Procedure:   The Patient was taken to the endoscopy suite and positioned in the left lateral decubitus position with knees flexed. Monitored anesthesia care was administered. A time-out was performed.    The perineum and perianal skin were examined. A digital rectal examination was performed. No masses or injuries noted in the distal rectum. A well-lubricated adult colonoscope was then inserted and carefully navigated to the proximal sigmoid colon. The scope was then withdrawn as the mucosa was circumferentially examined. No signs of sigmoid colon or rectal injury. No blood or perforation.  The scope was straightened and excess gas was suctioned from the colon and the scope removed, terminating the procedure. Please see. Dr. Zee and dr. Oconnell operative reports for details on the other portions of the procedure.            Alden Abraham MD

## 2024-07-17 NOTE — OPERATIVE REPORT
[facility]    PATIENT'S NAME: DWIGHT MONTERO   ATTENDING PHYSICIAN: Tiny Zee D.O.   OPERATING PHYSICIAN: Tiny Zee D.O.   PATIENT ACCOUNT#:   [id number]    LOCATION:  [address] [place] [address]  MEDICAL RECORD #:   [id number]       YOB: 1980  ADMISSION DATE:       07/05/2024      OPERATION DATE:  07/05/2024    OPERATIVE REPORT    PREOPERATIVE DIAGNOSIS:  Abnormal uterine bleeding, uterine fibroids, anemia.  POSTOPERATIVE DIAGNOSIS:  Abnormal uterine bleeding, uterine fibroids, anemia.  PROCEDURE:  Total abdominal hysterectomy, bilateral salpingectomy, intraoperative surgical consult, and sigmoidoscopy.    ASSISTANT SURGEON:  Emerald Avilez MD.    CONSULTING SURGEONS:  Lars Olvera MD and Alden Abraham MD.    SPECIMENS:  Uterus, cervix, and bilateral fallopian tubes.    ESTIMATED BLOOD LOSS:  500 mL.    FINDINGS:  Enlarged fibroid uterus, right hydrosalpinx, dense adhesions of the left adnexa to rectum.    OPERATIVE TECHNIQUE:  Patient was taken to the operating room where general anesthesia was obtained without difficulty.  She was then prepared and draped in normal sterile fashion in dorsal supine position.  A midline skin incision was then made from symphysis pubis up to the umbilicus and incision extended down to the fascia.  Fascia incised in the midline, and incision extended superiorly and inferiorly.  Rectus muscles were then  in the midline.  The peritoneum identified and entered.  The peritoneal incision was then extended superiorly and inferiorly with good visualization of the bladder.  O'Tremayne-O'Wolf retractor placed intra-abdominally, and the bowel packed away with moist laparotomy sponges.  The uterus was then brought out through the incision and noted to be having multiple fibroids.  A single-tooth tenaculum applied to the uterine fundus to provide the means to manipulate the uterus.  The round ligaments were then doubly clamped, transected, and  suture ligated with 0 Vicryl with good hemostasis.  The bladder flap was then created using Metzenbaum, DeBakey, and a sponge stick, and bladder was dissected off the lower uterine segment.  The right fallopian tube was then clamped with a Marie clamps and transected with good hemostasis, and the pedicle suture ligated with 0 Vicryl.  The utero-ovarian ligament was then clamped with a Marie clamps, transected, and suture ligated with great hemostasis.  The left ovary noted to be adhesed to the posterior cul-de-sac and was peeled off bluntly from the cul-de-sac walls and rectum.  The utero-ovarian ligament was then identified, doubly clamped with a Marie clamps, transected and suture ligated with good hemostasis.  The left fallopian tube was then excised with the pedicle suture ligated again with good hemostasis.  Uterine arteries were then skeletonized bilaterally, doubly clamped, transected, and suture ligated.  Uterosacral and cardinal ligaments were doubly clamped bilaterally, transected, and suture ligated with good hemostasis.  The uterus and cervix were then amputated using Rob scissors.  Vaginal cuff closed with a 1-0 Vicryl in a running fashion with good hemostasis.  General surgery consult was then requested intraoperatively to evaluate the distal colon and rectum where the left adnexa was dissected from and will be dictated separately.  The pelvis was then irrigated copiously, and Surgicel placed on the vaginal cuff and bilateral ovaries.  Good hemostasis assured.  All instruments were then removed from the abdomen, and the peritoneum closed with 2-0 Vicryl in a running fashion, plain gut used to close subcuticular tissue and staples used to close the skin.  Patient tolerated the procedure well, was taken to the recovery room in stable condition.    Dictated By Tiny Zee D.O.  d: 07/16/2024 13:45:46  t: 07/16/2024 20:09:38  Job [id number]  EP/

## 2024-07-19 NOTE — CONSULTS
[facility]        Patient Name:  Rubia Ladd   YOB: 1980   Gender:  Female   Appt Date:  7/22/2024   Provider:  Gibran Headley MD     PATIENT PROVIDERS  Referring Provider: Tiny Zee MD    Primary Care Provider:Christina Loredo DO   Address: [address] [address]   Phone #: [phone]       CHIEF COMPLAINT  New Consult: SCC of Cervix     PROBLEMS  Reviewed   Patient Active Problem List   Diagnosis    History of ovarian cyst    External hemorrhoid    Class 1 obesity due to excess calories without serious comorbidity with body mass index (BMI) of 32.0 to 32.9 in adult    Prediabetes    Elevated LDL cholesterol level    Iron deficiency anemia secondary to blood loss (chronic)    Fibroid, uterine    Uterine bleeding    Iron deficiency anemia due to chronic blood loss    Squamous cell carcinoma of cervix (HCC)        History of Present Illness:  Patient is a 44 year old female who is currently being referred for consideration of surgical oncology management of recently diagnosed SCC of cervix. History of uterine fibroids, hemroidectomy 05/24, anemia. Patient has history of sister with uterine cancer.     05/24/2024:Trans excision of rectal mass- Dr. Olvera suspected this as condyloma. Transanal excision, rectal \"mass\"--> No evidence of dysplasia or malignancy.     05/30/2024: Patient had mammography- BIRADS category 4a, breast biopsy performed and resulted as fibroadenoma.     05/31/2024: ED visit patient has long standing history of heavy menses, Hgb was 6.3, she received 1 unit of PRBCs and started on provera.     06/03/2024: Established care with Dr. Zee post ED visit.     07/05/2024: Patient underwent a TAB with bilateral salpingectomy.Dr. Olvera and Dr. Abraham consulted intraoperatively for possible rectal injury- no signs of rectal injury.   Uterus, cervix and bilateral fallopian tubes sent for pathology --> Invasive moderately  differentiated squamous cell carcinoma of the cervix.    Patient had noticed an increase of vaginal bleeding post hemorrhoidectomy on 05/24. She was passing large clots and presented to the ED on 05/31. She has no history of pap smears, she has not been receiving routine care for the past 20 years due to no insurance issues.      Vital Signs:  /87 (BP Location: Left arm, Patient Position: Sitting, Cuff Size: adult)   Pulse 108   Temp 99.2 °F (37.3 °C) (Temporal)   Resp 20   Ht 1.6 m (5' 3\")   Wt 86.4 kg (190 lb 6.4 oz)   LMP 06/26/2024 (Exact Date)   BMI 33.73 kg/m²      Medications Reviewed:    Current Outpatient Medications:     metroNIDAZOLE 0.75 % Vaginal Gel, Place 1 Applicatorful vaginally nightly., Disp: 70 g, Rfl: 0    diclofenac (VOLTAREN) 1 % External Gel, Apply 4 g topically every 6 (six) hours as needed., Disp: 1 each, Rfl: 2    ondansetron 4 MG Oral Tablet Dispersible, Take 1 tablet (4 mg total) by mouth every 4 (four) hours as needed for Nausea., Disp: 10 tablet, Rfl: 0    ibuprofen 100 MG/5ML Oral Suspension, Take 20 mL (400 mg total) by mouth every 4 (four) hours as needed for Pain. (Patient not taking: Reported on 7/12/2024), Disp: , Rfl:      Allergies Reviewed:  Allergies   Allergen Reactions    Morphine NAUSEA AND VOMITING        History:  Reviewed:  Past Medical History:    Anal condyloma    nausea post operatively    Asthma (HCC)    exercise induced as a child-no issues as an adult    H/O pelvic ultrasound    CONCLUSION:  Stable uterine fibroids, sizable, the largest of which measures up to 7.3 cm.  Measurements given above.  Right ovarian cyst appears simple by ultrasound 5.4 cm maximum dimension.  No pelvic free fluid.    History of urinary reflux    PONV (postoperative nausea and vomiting)    Problems with swallowing    can't swallow pills    Ruptured ovarian cyst    Visual impairment      Reviewed:  Past Surgical History:   Procedure Laterality Date    Hysterectomy  07/05/2024     TOTAL ABDOMINAL HYSTERECTOMY, BILATERAL SALPINGECTOMY, INTRAOPERATIVE SURGICAL CONSULT, SIGMOIDOSCOPY      8/3/99    Other surgical history      bladder surgery for reflux    Other surgical history      rupture ovarian cyst with bleeding from fallopian tube-still has both ovaries    Other surgical history  2024    ANAL EXAM UNDER ANESTHESIA, EXCISION OF RECTAL MASS, EXCISION AND FULGURATION OF PERIANAL CONDYLOMA      Reviewed Social History:  Social History     Socioeconomic History    Marital status:    Tobacco Use    Smoking status: Never     Passive exposure: Never    Smokeless tobacco: Never   Vaping Use    Vaping status: Never Used   Substance and Sexual Activity    Alcohol use: Never    Drug use: Never    Sexual activity: Not Currently     Partners: Male     Birth control/protection: Hysterectomy   Other Topics Concern     Service No    Blood Transfusions No    Caffeine Concern No    Occupational Exposure No    Hobby Hazards No    Sleep Concern No    Stress Concern No    Weight Concern No    Special Diet No    Back Care No    Exercise No    Bike Helmet No    Seat Belt No    Self-Exams No     Social Determinants of Health     Food Insecurity: No Food Insecurity (2024)    Food Insecurity     Food Insecurity: Never true   Transportation Needs: No Transportation Needs (2024)    Transportation Needs     Lack of Transportation: No   Housing Stability: Low Risk  (2024)    Housing Stability     Housing Instability: No      Reviewed:  Family History   Problem Relation Age of Onset    No Known Problems Father     Other (ovarian cysts) Mother     Alcohol abuse Mother     No Known Problems Daughter     Hypertension Maternal Grandmother     No Known Problems Maternal Grandfather     No Known Problems Paternal Grandmother     No Known Problems Paternal Grandfather     Uterine Cancer Sister         dx age 30s    Pancreatic Cancer Neg     Colon Cancer Neg     Prostate Cancer Neg      Ovarian Cancer Neg     Breast Cancer Neg     Cancer Neg     Endometriosis Neg     Infertility Neg       Review of Systems:  GENERAL HEALTH: feels well, no fatigue.   RESPIRATORY: denies shortness of breath,  CARDIOVASCULAR: denies chest pain  GI: denies nausea, vomiting, constipation, diarrhea; no rectal bleeding  GENITAL/: no blood in urine  MUSCULOSKELETAL: no joint complaints, no back pain  NEURO: no tingling, numbness, weakness  ENDOCRINE: denies weight loss/gain  PSYCH: no mood changes       Physical Examination:  Constitutional: NAD.   Eyes: Sclera: non-icteric.   Lymph Nodes: Lymph Nodes no cervical LAD, supraclavicular LAD, axillary LAD, or inguinal LAD.   Lungs: Auscultation: breath sounds normal.   Cardiovascular: Heart Auscultation: RRR.   Abdomen: soft, no masses.  Incision healing well without drainage or erythema.  Musculoskeletal: Extremities: no edema.   Skin: Inspection and palpation: no jaundice.      Document Review:  04/20/2024: US Pelvis (Transabdominal and Transvaginal)   UTERUS:  11.09 cm x 5.48 cm x 12.04 cm     Endometrium Thickness: 1.30 cm     Multiple uterine fibroids.  Right fundal fibroid 7.3 x 6.2 cm.  Left fundal fibroid 5.5 x 4.3 cm.  Left body fibroid 4.7 x 4.3 cm.  Right body fibroid 3.0 x 3.0 cm.  Fibroid in the cervical region 4.7 x 5.6 cm.   RIGHT OVARY:  5.45 cm x 2.59 cm x 3.08 cm     5.4 x 1.8 x 2.6 cm cyst has simple appearance by ultrasound   LEFT OVARY:  Not visualized because of bowel gas   CUL-DE-SAC:  Normal.  No fluid or mass.    SPECIMEN   Procedure  Simple hysterectomy     Bilateral salpingectomy   TUMOR   Tumor Site  Left superior (anterior) quadrant (12 to 3 o’clock)     Left inferior (posterior) quadrant (3 to 6 o’clock)     Right inferior (posterior) quadrant (6 to 9 o’clock)     Right superior (anterior) quadrant (9 to 12 o’clock)   Tumor Size  Greatest Dimension (Centimeters): 5 cm   Histologic Type  Squamous cell carcinoma, NOS   Histologic Grade  G2,  moderately differentiated        Depth of Stromal Invasion  16 mm   Extent of Depth of Stromal Invasion  Deep one-third   Horizontal Extent of Stromal Invasion  20 mm   Other Tissue / Organ Involvement  Parametrium   Lymphatic and / or Vascular Invasion  Present   MARGINS   Margin Status for Invasive Carcinoma  All margins negative for invasive carcinoma   Margin Status for HSIL or AIS  Not applicable   REGIONAL LYMPH NODES     Not applicable (no regional lymph nodes submitted or found)   pTNM CLASSIFICATION (AJCC 9th Version)   Reporting of pT, pN, and (when applicable) pM categories is based on information available to the pathologist at the time the report is issued. As per the AJCC (Chapter 1, 8th Ed.) it is the managing physician’s responsibility to establish the final pathologic stage based upon all pertinent information, including but potentially not limited to this pathology report.   pT Category  pT2b     05/28/2024: Rectal pathology  Transanal excision, rectal \"mass\":  -Polypoid portions of colonic mucosa with changes most consistent with mucosal prolapse/mucosal prolapse polyp(s).  -No evidence of dysplasia or malignancy.      07/05/2024: Pathology   A.  Uterus, cervix and bilateral fallopian tubes:  -Invasive moderately differentiated squamous cell carcinoma of the cervix.  -Tumor invades the deep one-third of the cervical stroma.  -Tumor involves the right parametrial tissue.  -Focal lymphatic/vascular invasion.  -Benign endometrium.  -Intramural leiomyomata.  -Bilateral fallopian tubes with paratubal cysts.     Procedure(s):  None     Assessment / Plan:  Squamous cell carcinoma of cervix  Findings were discussed with patient.  Staging of squamous cell carcinoma discussed.  Pathology report includes AJCC staging.  Will also assess FIGO staging with our pathology colleagues.  Will present at our upcoming multidisciplinary tumor board.  Question: Lymphadenectomy versus EBRT?  Thus, final recommendations  pending.  Patient agreed and understood.  She had ample time to ask questions.    Obesity  -BMI 33.7        Follow Up:  Will call patient       Electronically Signed by: Gibran Headley MD

## 2024-07-20 NOTE — PROGRESS NOTES
Rubia Ladd is a 44 year old female  Patient's last menstrual period was 2024 (exact date).   Chief Complaint   Patient presents with    Post-Op     TOTAL ABDOMINAL HYSTERECTOMY, BILATERAL SALPINGECTOMY, INTRAOPERATIVE SURGICAL CONSULT, SIGMOIDOSCOPY, 24, remaining staples remover?    .Patient noticed vaginal discharge with odor, has appointment with  , she had CHANTEL for menometrorrhagia after she came in to ED with severe anemia , pathology report showed invasive cervical cancer , patent does not remember when she had last pap smear      OBSTETRICS HISTORY:  OB History    Para Term  AB Living   1 1 1     1   SAB IAB Ectopic Multiple Live Births           1      # Outcome Date GA Lbr Shyam/2nd Weight Sex Type Anes PTL Lv   1 Term 99 40w0d  7 lb 3 oz (3.26 kg) F NORMAL SPONT   BEHZAD       GYNE HISTORY:  Periods irregular heavy    History   Sexual Activity    Sexual activity: Not Currently    Partners: Male    Birth control/ protection: Hysterectomy                 MEDICAL HISTORY:  Past Medical History:    Anal condyloma    nausea post operatively    Asthma (HCC)    exercise induced as a child-no issues as an adult    H/O pelvic ultrasound    CONCLUSION:  Stable uterine fibroids, sizable, the largest of which measures up to 7.3 cm.  Measurements given above.  Right ovarian cyst appears simple by ultrasound 5.4 cm maximum dimension.  No pelvic free fluid.    History of urinary reflux    PONV (postoperative nausea and vomiting)    Problems with swallowing    can't swallow pills    Ruptured ovarian cyst    Visual impairment       SURGICAL HISTORY:  Past Surgical History:   Procedure Laterality Date    Hysterectomy  2024    TOTAL ABDOMINAL HYSTERECTOMY, BILATERAL SALPINGECTOMY, INTRAOPERATIVE SURGICAL CONSULT, SIGMOIDOSCOPY      8/3/99    Other surgical history      bladder surgery for reflux    Other surgical history      rupture ovarian cyst with bleeding  from fallopian tube-still has both ovaries    Other surgical history  05/24/2024    ANAL EXAM UNDER ANESTHESIA, EXCISION OF RECTAL MASS, EXCISION AND FULGURATION OF PERIANAL CONDYLOMA       SOCIAL HISTORY:  Social History     Socioeconomic History    Marital status:      Spouse name: Not on file    Number of children: Not on file    Years of education: Not on file    Highest education level: Not on file   Occupational History    Not on file   Tobacco Use    Smoking status: Never     Passive exposure: Never    Smokeless tobacco: Never   Vaping Use    Vaping status: Never Used   Substance and Sexual Activity    Alcohol use: Never    Drug use: Never    Sexual activity: Not Currently     Partners: Male     Birth control/protection: Hysterectomy   Other Topics Concern     Service No    Blood Transfusions No    Caffeine Concern No    Occupational Exposure No    Hobby Hazards No    Sleep Concern No    Stress Concern No    Weight Concern No    Special Diet No    Back Care No    Exercise No    Bike Helmet No    Seat Belt No    Self-Exams No   Social History Narrative    Not on file     Social Determinants of Health     Financial Resource Strain: Not on file   Food Insecurity: No Food Insecurity (7/5/2024)    Food Insecurity     Food Insecurity: Never true   Transportation Needs: No Transportation Needs (7/5/2024)    Transportation Needs     Lack of Transportation: No     Car Seat: Not on file   Physical Activity: Not on file   Stress: Not on file   Social Connections: Not on file   Housing Stability: Low Risk  (7/5/2024)    Housing Stability     Housing Instability: No     Housing Instability Emergency: Not on file     Crib or Bassinette: Not on file       FAMILY HISTORY:  Family History   Problem Relation Age of Onset    No Known Problems Father     Other (ovarian cysts) Mother     Alcohol abuse Mother     No Known Problems Daughter     Hypertension Maternal Grandmother     No Known Problems Maternal  Grandfather     No Known Problems Paternal Grandmother     No Known Problems Paternal Grandfather     Uterine Cancer Sister         dx age 30s    Pancreatic Cancer Neg     Colon Cancer Neg     Prostate Cancer Neg     Ovarian Cancer Neg     Breast Cancer Neg     Cancer Neg     Endometriosis Neg     Infertility Neg        MEDICATIONS:    Current Outpatient Medications:     metroNIDAZOLE 0.75 % Vaginal Gel, Place 1 Applicatorful vaginally nightly., Disp: 70 g, Rfl: 0    ibuprofen 100 MG/5ML Oral Suspension, Take 20 mL (400 mg total) by mouth every 4 (four) hours as needed for Pain. (Patient not taking: Reported on 7/12/2024), Disp: , Rfl:     diclofenac (VOLTAREN) 1 % External Gel, Apply 4 g topically every 6 (six) hours as needed. (Patient not taking: Reported on 7/12/2024), Disp: 1 each, Rfl: 2    ondansetron 4 MG Oral Tablet Dispersible, Take 1 tablet (4 mg total) by mouth every 4 (four) hours as needed for Nausea. (Patient not taking: Reported on 7/12/2024), Disp: 10 tablet, Rfl: 0    ALLERGIES:    Allergies   Allergen Reactions    Morphine NAUSEA AND VOMITING         Review of Systems:  Constitutional:  Denies fatigue, night sweats, hot flashes  Eyes:  denies blurred or double vision  Cardiovascular:  denies chest pain or palpitations  Respiratory:  denies shortness of breath  Gastrointestinal:  denies heartburn, abdominal pain, diarrhea or constipation  Genitourinary:  denies dysuria, incontinence, abnormal vaginal discharge, vaginal itching  Musculoskeletal:  denies back pain.  Skin/Breast:  Denies any breast pain, lumps, or discharge.   Neurological:  denies headaches, extremity weakness or numbness.  Psychiatric: denies depression or anxiety.  Endocrine:   denies excessive thirst or urination.  Heme/Lymph:  denies history of anemia, easy bruising or bleeding.      PHYSICAL EXAM:   Constitutional: well developed, well nourished  Head/Face: normocephalic  Abdomen:  soft, nontender, nondistended, no  masses  Skin/Hair: no unusual rashes or bruises  Extremities: no edema, no cyanosis  Psychiatric:  Oriented to time, place, person and situation. Appropriate mood and affect    Removed remaining staples from incision - healing, no bleeding no erythema       Assessment & Plan:  Diagnoses and all orders for this visit:    Vaginal odor  -     metroNIDAZOLE 0.75 % Vaginal Gel; Place 1 Applicatorful vaginally nightly.

## 2024-07-25 NOTE — TELEPHONE ENCOUNTER
JANICE Hatch in Surgery Oncology with Dr. Headley, is calling to refer patient to Dr. Paul Ortiz for scc of the cervix. Message Holden May or call at [phone]. Called 7/25/24.

## 2024-07-26 NOTE — TELEPHONE ENCOUNTER
Patient is notified of recommendations from tumor board. She is aware that the next step would be EBRT. She is scheduled with Dr. Paul Ortiz on 08/09/2024.

## 2024-08-09 NOTE — PATIENT INSTRUCTIONS
- CALL CENTRAL SCHEDULING AT [phone] TO SCHEDULE YOUR PET/CT AND MRI SCANS.    - DR. FERNANDES'S OFFICE WILL CALL TO SCHEDULE A CONSULTATION APPOINTMENT.    - WE WILL CALL TO SCHEDULE YOUR CT SIMULATION/MAPPING FOR RADIATION TREATMENT.     - PLEASE FOLLOW THE FULL BLADDER INSTRUCTIONS FOR YOUR CT SIMULATION AND FOR YOUR RADIATION TREATMENTS.     - IF YOU HAVE ANY QUESTIONS OR CONCERNS REGARDING RADIATION THERAPY, PLEASE CALL [phone].

## 2024-08-09 NOTE — PROGRESS NOTES
Nursing Consultation Note  Patient: Rubia Ladd  YOB: 1980  Age: 44 year old  Radiation Oncologist: Dr. Kulwinder Ortiz  Referring Physician: Dr. Saadia Almaraz  Diagnosis: CERVICAL CANCER  Consult Date: 8/9/2024      Chemotherapy: N/A  Labs: Reviewed  Imaging: Reviewed  Is the patient of child-bearing age?         Yes   Female: LMP: 5/16/24 Has egg harvesting been discussed? Not Applicable ... Is there any possibility that the patient is pregnant?   No    Has the patient received radiation therapy in the past? no  Does the patient have an implantable device?No   Patient has/has had:     1. Assistive Devices: N/A    2. Flu Vaccination: no-referral to ask PCP    3. Pneumonia Vaccination:  no--referral to ask PCP    Vital Signs:   Vitals:    08/09/24 1433   BP: 139/84   Pulse: 106   Resp: 20   Temp: 99.4 °F (37.4 °C)   ,   Wt Readings from Last 6 Encounters:   08/09/24 84.8 kg (187 lb)   07/22/24 86.4 kg (190 lb 6.4 oz)   07/20/24 85.7 kg (189 lb)   07/12/24 84.6 kg (186 lb 8 oz)   07/05/24 87.1 kg (192 lb)   06/26/24 84.8 kg (187 lb)       Nursing Note: Pt presented with vaginal bleeding/clotting after hemorrhoidectomy in May 2024. Went to ER on 5/31 due to passing large clots, received PRBC for Hgb = 6.3. Met with Dr. Zee, recommended surgery. Had CHANTEL-BSO on 7/5/24, path showed invasive mod diff SCCa of cervix, involving 1/3 of cervical stroma and +focal lymphatic/vascular invasion; HPV+, no LN sampling. Met with Dr. Headley, treatment options discussed. Case discussed in TB, referred for RT consult. Pt here with son, AOx4. States staples olut, incision site healed well. Has intermittent dark-brown vaginal discharge, no bleeding. Completed Metronidazole  for vaginal odor. States urinating well. No bowel issues since hemorrhoidectomy. Has intermittent lower abdominal/pelvic discomfort, no pain meds needed.           Review of Systems   Constitutional:  Positive for fatigue.    HENT: Negative.     Eyes: Negative.    Respiratory: Negative.     Cardiovascular: Negative.    Gastrointestinal: Negative.    Endocrine: Negative.    Genitourinary:  Positive for pelvic pain and vaginal discharge.        Has occ brownish-colored vaginal discharge  Has intermittent pelvic discomfort   Musculoskeletal: Negative.    Skin: Negative.    Allergic/Immunologic: Negative.    Neurological:  Positive for dizziness and light-headedness.   Hematological: Negative.    Psychiatric/Behavioral: Negative.            Allergies:  Allergies   Allergen Reactions    Morphine NAUSEA AND VOMITING       Current Outpatient Medications   Medication Sig Dispense Refill    metroNIDAZOLE 0.75 % Vaginal Gel Place 1 Applicatorful vaginally nightly. (Patient not taking: Reported on 8/9/2024) 70 g 0    diclofenac (VOLTAREN) 1 % External Gel Apply 4 g topically every 6 (six) hours as needed. (Patient not taking: Reported on 8/9/2024) 1 each 2    ondansetron 4 MG Oral Tablet Dispersible Take 1 tablet (4 mg total) by mouth every 4 (four) hours as needed for Nausea. (Patient not taking: Reported on 8/9/2024) 10 tablet 0       Preferred Pharmacy:    [id number] - [place], [place] - [facility] [address] [facility] AT [address] , [phone], [phone]  [address] [facility] [address]  Phone: [phone] Fax: [phone]      Past Medical History:    Anal condyloma    nausea post operatively    Asthma (HCC)    exercise induced as a child-no issues as an adult    Cervical cancer (HCC)    H/O pelvic ultrasound    CONCLUSION:  Stable uterine fibroids, sizable, the largest of which measures up to 7.3 cm.  Measurements given above.  Right ovarian cyst appears simple by ultrasound 5.4 cm maximum dimension.  No pelvic free fluid.    History of urinary reflux    PONV (postoperative nausea and vomiting)    Problems with swallowing    can't swallow pills    Ruptured ovarian cyst    Visual  impairment       Past Surgical History:   Procedure Laterality Date    Hysterectomy  2024    TOTAL ABDOMINAL HYSTERECTOMY, BILATERAL SALPINGECTOMY, INTRAOPERATIVE SURGICAL CONSULT, SIGMOIDOSCOPY      8/3/99    Other surgical history      bladder surgery for reflux    Other surgical history      rupture ovarian cyst with bleeding from fallopian tube-still has both ovaries    Other surgical history  2024    ANAL EXAM UNDER ANESTHESIA, EXCISION OF RECTAL MASS, EXCISION AND FULGURATION OF PERIANAL CONDYLOMA       Social History     Socioeconomic History    Marital status:      Spouse name: Not on file    Number of children: 1    Years of education: Not on file    Highest education level: Not on file   Occupational History    Occupation: on leave of absence   Tobacco Use    Smoking status: Never     Passive exposure: Never    Smokeless tobacco: Never   Vaping Use    Vaping status: Never Used   Substance and Sexual Activity    Alcohol use: Never    Drug use: Never    Sexual activity: Not Currently     Partners: Male     Birth control/protection: Hysterectomy   Other Topics Concern     Service No    Blood Transfusions No    Caffeine Concern No    Occupational Exposure No    Hobby Hazards No    Sleep Concern No    Stress Concern No    Weight Concern No    Special Diet No    Back Care No    Exercise No    Bike Helmet No    Seat Belt No    Self-Exams No   Social History Narrative    , lives with     Has 1 son     Social Determinants of Health     Financial Resource Strain: Not on file   Food Insecurity: No Food Insecurity (2024)    Food Insecurity     Food Insecurity: Never true   Transportation Needs: No Transportation Needs (2024)    Transportation Needs     Lack of Transportation: No     Car Seat: Not on file   Physical Activity: Not on file   Stress: Not on file   Social Connections: Not on file   Housing Stability: Low Risk  (2024)    Housing Stability     Housing  Instability: No     Housing Instability Emergency: Not on file     Crib or Bassinette: Not on file       ECOG:  Grade 1 - No physically strenuous activity, but ambulatory and able to carry out light and sedentary work (e.g. office work, light house work).    Education: YES  Knowledge Deficit Plan Of Care:    Problem:  Knowledge Deficit    Problems related to:    Radiation therapy    Interventions:  Instruct on purpose of radiation therapy    Expected Outcomes:  Knowledge of radiation therapy    Progress Toward Outcome:  Making progress    Pamphlets/Handouts Given to Patient:  Understanding radiation therapy      Are ADL's met?  Yes  Does patient feel safe in their environment?  Yes  Care decisions:  Patient and/or surrogate IS involved in care decisions.  Advanced directives:  Patient DOES NOT have advanced directives.  Transportation:  Adequate transportation available for expected visits    Pain:   ;Pain Score: 0   ;    ;

## 2024-08-16 NOTE — PROGRESS NOTES
Rubia Ladd is a 44 year old female  Patient's last menstrual period was 2024 (exact date).   Chief Complaint   Patient presents with    Follow - Up     6 week surgical follow-up   TOTAL ABDOMINAL HYSTERECTOMY, BILATERAL SALPINGECTOMY, INTRAOPERATIVE SURGICAL CONSULT, SIGMOIDOSCOPY 24    Other     Yes student    .Patient c/o pink vaginal discharge, she is going for radiation therapy and chemo to treat cervical cance    OBSTETRICS HISTORY:  OB History    Para Term  AB Living   1 1 1     1   SAB IAB Ectopic Multiple Live Births           1      # Outcome Date GA Lbr Shyam/2nd Weight Sex Type Anes PTL Lv   1 Term 99 40w0d  7 lb 3 oz (3.26 kg) F NORMAL SPONT   BEHZAD      Obstetric Comments   Menarche: 14 y/o   LMP: 26   OCP x 1month       GYNE HISTORY:  Periods none due to hysterectomy    History   Sexual Activity    Sexual activity: Not Currently    Partners: Male    Birth control/ protection: Hysterectomy                 MEDICAL HISTORY:  Past Medical History:    Anal condyloma    nausea post operatively    Asthma (HCC)    exercise induced as a child-no issues as an adult    Cervical cancer (HCC)    H/O pelvic ultrasound    CONCLUSION:  Stable uterine fibroids, sizable, the largest of which measures up to 7.3 cm.  Measurements given above.  Right ovarian cyst appears simple by ultrasound 5.4 cm maximum dimension.  No pelvic free fluid.    History of urinary reflux    PONV (postoperative nausea and vomiting)    Problems with swallowing    can't swallow pills    Ruptured ovarian cyst    Visual impairment       SURGICAL HISTORY:  Past Surgical History:   Procedure Laterality Date    Hysterectomy  2024    TOTAL ABDOMINAL HYSTERECTOMY, BILATERAL SALPINGECTOMY, INTRAOPERATIVE SURGICAL CONSULT, SIGMOIDOSCOPY      8/3/99    Other surgical history      bladder surgery for reflux    Other surgical history      rupture ovarian cyst with bleeding from fallopian tube-still  has both ovaries    Other surgical history  05/24/2024    ANAL EXAM UNDER ANESTHESIA, EXCISION OF RECTAL MASS, EXCISION AND FULGURATION OF PERIANAL CONDYLOMA       SOCIAL HISTORY:  Social History     Socioeconomic History    Marital status:      Spouse name: Not on file    Number of children: 1    Years of education: Not on file    Highest education level: Not on file   Occupational History    Occupation: on leave of absence   Tobacco Use    Smoking status: Never     Passive exposure: Never    Smokeless tobacco: Never   Vaping Use    Vaping status: Never Used   Substance and Sexual Activity    Alcohol use: Never    Drug use: Never    Sexual activity: Not Currently     Partners: Male     Birth control/protection: Hysterectomy   Other Topics Concern     Service No    Blood Transfusions No    Caffeine Concern No    Occupational Exposure No    Hobby Hazards No    Sleep Concern No    Stress Concern No    Weight Concern No    Special Diet No    Back Care No    Exercise No    Bike Helmet No    Seat Belt No    Self-Exams No   Social History Narrative    , lives with     Has 1 son     Social Determinants of Health     Financial Resource Strain: Not on file   Food Insecurity: No Food Insecurity (7/5/2024)    Food Insecurity     Food Insecurity: Never true   Transportation Needs: No Transportation Needs (7/5/2024)    Transportation Needs     Lack of Transportation: No     Car Seat: Not on file   Physical Activity: Not on file   Stress: Not on file   Social Connections: Not on file   Housing Stability: Low Risk  (7/5/2024)    Housing Stability     Housing Instability: No     Housing Instability Emergency: Not on file     Crib or Bassinette: Not on file       FAMILY HISTORY:  Family History   Problem Relation Age of Onset    No Known Problems Father     Other (ovarian cysts) Mother     Alcohol abuse Mother     No Known Problems Daughter     Hypertension Maternal Grandmother     No Known Problems  Maternal Grandfather     No Known Problems Paternal Grandmother     No Known Problems Paternal Grandfather     Uterine Cancer Sister         dx age 30s    Pancreatic Cancer Neg     Colon Cancer Neg     Prostate Cancer Neg     Ovarian Cancer Neg     Breast Cancer Neg     Cancer Neg     Endometriosis Neg     Infertility Neg        MEDICATIONS:    Current Outpatient Medications:     metroNIDAZOLE 0.75 % Vaginal Gel, Place 1 Applicatorful vaginally nightly. (Patient not taking: Reported on 8/9/2024), Disp: 70 g, Rfl: 0    diclofenac (VOLTAREN) 1 % External Gel, Apply 4 g topically every 6 (six) hours as needed. (Patient not taking: Reported on 8/9/2024), Disp: 1 each, Rfl: 2    ondansetron 4 MG Oral Tablet Dispersible, Take 1 tablet (4 mg total) by mouth every 4 (four) hours as needed for Nausea. (Patient not taking: Reported on 8/9/2024), Disp: 10 tablet, Rfl: 0    ALLERGIES:    Allergies   Allergen Reactions    Morphine NAUSEA AND VOMITING         PHYSICAL EXAM:   Pelvic Exam:  External Genitalia: normal appearance, hair distribution, and no lesions  Urethral Meatus:  normal in size, location, without lesions and prolapse  Bladder:  No fullness, masses or tenderness  Vagina:  Normal appearance without lesions, milky pink discharge, vaginal cuff intact  Cervix:  absent  Uterus: absent  Adnexa: normal without masses or tenderness  Perineum: normal  Anus: no hemorroids     Assessment & Plan:  1. Vaginal discharge    - Vaginitis Vaginosis PCR Panel

## 2024-08-16 NOTE — TELEPHONE ENCOUNTER
LA paperwork was received, $25 payment was not processed- patient states she was unable to pay at this time, forms were emailed and inter-office mailed to Forms Department.     Pt advised to call Forms Department for any questions or concerns at [phone].

## 2024-08-20 NOTE — CONSULTS
[facility]    PATIENT'S NAME: DWIGHT MONTERO   RADIATION ONCOLOGIST: Kulwinder Ortiz M.D.   PATIENT ACCOUNT #: [id number] LOCATION: [facility]   [facility]   MEDICAL RECORD #: [id number] YOB: 1980   CONSULTATION DATE: 08/09/2024       RADIATION ONCOLOGY CONSULTATION    REFERRING PHYSICIAN:  Gibran Headley MD    DIAGNOSIS:  Squamous cell carcinoma of the cervix, pathologic T2bNx.    HISTORY OF PRESENT ILLNESS:  The patient is a 44-year-old female who had presented in the early part of 2024 with complaints of a longstanding hemorrhoid with bleeding and irritation.  She also had a history of ovarian cysts as well.  She had not had medical care in quite some time and was then referred to Dr. Olvera to assess her hemorrhoid and Dr. Zee to establish gynecological care.  Dr. Olvera examined the patient and found a 1 to 2 cm prolapsing condylomatous mass in the right anterior anal canal with a separate 5 mm condylomatous lesion just outside the posterior vagina on the perineum.  Dr. Olvera recommended an exam under anesthesia with anoscopy in addition to any excision and fulguration of any additional perianal condyloma.  The patient was ruled out for HIV and ultimately went to the operating room on 05/24/2024.  At that time, Dr. Olvera noted prolapsing and condylomatous-like pedunculated left anterior rectal mass.  This was excised and the pathology came back as polypoid portions of colonic mucosa with changes most consistent with mucosal prolapse.  After the procedure, she began having some vaginal bleeding with blood clots.  She went to the emergency room on 05/31/2024 and the hemoglobin had dropped to 6.3.  She then was transfused and started on Provera.  Her bleeding did improve, and she was then seen by Dr. Zee for further evaluation.  An ultrasound of the pelvis had previously shown sizable uterine fibroids, and it was felt that the bleeding was a result of these.  Ultimately, the  patient went for a total abdominal hysterectomy and bilateral salpingectomy as a result of these symptoms.  This procedure took place on 07/05/2024 and the pathology gave the surprise diagnosis of invasive moderately-differentiated squamous cell carcinoma of the cervix.  It was noted that the tumor invaded the deep one-third (16/20 mm) of the cervical stroma and did involve the right parametrial tissue.  There was focal lymphovascular invasion.  The fallopian tubes were negative for any involvement.  Margins were all negative.  No lymph nodes were sampled as this procedure was done for what was presumed to be benign disease.  It was pathologically staged as a T2bNx.  After surgery, the patient was seen by Dr. Headley who then referred the patient on to Radiation Oncology for a discussion regarding potential additional treatment.    The patient currently feels rather well.  The staples have been removed and the incision site is healed.  She does have some intermittent dark brown vaginal discharge but no bleeding.  She did take a course of metronidazole.  She has no urinary or bowel issues and feels that her bowel problems have improved since she had the rectal procedure.  She has some intermittent lower abdominal pelvic discomfort but this is not terribly profound and she does not require any pain medications.  She denies any urinary difficulties.    PAST MEDICAL HISTORY:  The patient has a past history of asthma, ruptured ovarian cyst, anal condyloma.    PAST SURGICAL HISTORY:  Bladder surgery for reflux, ruptured ovarian cyst, and the aforementioned rectal mass excision and hysterectomy as per HPI.      MEDICATIONS:  Voltaren and metronidazole.    ALLERGIES:  Morphine.    FAMILY HISTORY:  Sister with uterine cancer in her 30s.    SOCIAL HISTORY:  The patient is a never smoker who reports no alcohol use.  She denies transportation-related difficulties.    REVIEW OF SYSTEMS:  A 14-point review of systems is performed.   Pertinent positives and negatives are as per HPI.      PHYSICAL EXAMINATION:    GENERAL:  A 44-year-old female who is pleasant, cooperative, and alert, awake, oriented x3.  She is in no acute distress.  She has an ECOG performance score of 1 and a current pain score of 0.  VITAL SIGNS:  Blood pressure 139/84, pulse of 106, respiratory rate 20, and temperature 99.4.  Her weight is 187 pounds.  HEENT:  Pupils are equal, round, react to light and accommodation and the extraocular movement is intact.  The oral cavity is without ulcerations or lesions.  NECK:  Supple with no lymphadenopathy.  LUNGS:  Clear to auscultation bilaterally.  HEART:  Regular rate and rhythm.  Normal S1, S2, with no audible murmurs.    LYMPHATICS:  There is no supraclavicular, axillary, inguinal lymphadenopathy.  ABDOMEN:  Soft, nontender, and nondistended with normoactive bowel sounds and no hepatosplenomegaly.  EXTREMITIES:  Without clubbing, cyanosis, or edema.   NEUROLOGIC:  Cranial nerves II-XII are grossly intact.  There are no focal deficits.    IMPRESSION:  This is a 44-year-old female recently diagnosed with squamous cell carcinoma of the cervix.  This is noted to be HPV positive.  She has undergone hysterectomy which resected a pathologic t2b tumor with significant deep invasion of the cervical stroma as well as parametrial invasion and invasion to the lymphovascular space.  Lymph nodes were not sampled as there was no knowledge that this was a malignant process prior to surgery.    RECOMMENDATIONS:  I would like to obtain additional scans to rule out any other regional or distant disease.  I would like to obtain a pelvic MRI for good eliel assessment as well as a PET scan to look for any regional or distant disease.  Given that the patient does have, at a minimum, parametrial invasion, she does require pelvic radiation alongside platinum-based regimen to improve disease-free and overall survival.  She may also benefit from  intracavitary radiation to the vaginal cuff to minimize the likelihood of recurrence in that area.  Given the circumstances regarding her diagnosis, I feel that intracavitary brachytherapy would likely be beneficial in this particular case.  From a pelvic radiotherapeutic standpoint, I would recommend 5040 cGy in 180 cGy daily fractions utilizing intensity-modulated radiotherapy as well as image guidance.  These can help improve the accuracy of treatment and minimize morbidity.    I will, therefore, order the pelvic MRI and the PET scan and also make a referral to Medical Oncology for their opinion and to discuss the chemotherapeutic component of treatment.  After these tests have been obtained, we can then schedule the patient for simulation with the intent to begin treatment shortly thereafter.    I then had a long talk with the patient regarding the potential side effects of radiation treatment.  I told her that she will experience a number of issues including urinary and bowel side effects.  This includes urinary frequency, urgency, incomplete emptying, nocturia, hesitancy.  There also can be loose stools or diarrhea.  Fatigue is commonplace as well.  These side effects will worsen during the course of therapy but should resolve fairly quickly after radiation is complete.  I do not expect any long-term or permanent side effects as a result of these treatments but radiation cystitis or radiation proctitis are certainly possible.  Additionally, assuming we do ultimately decide to proceed with intracavitary treatment, there is a risk of tissue necrosis or fistula formation.  These can be mildly symptomatic or, occasionally, quite problematic requiring surgical intervention.  Following our long and thorough discussion of all the risks and benefits of treatment, the patient indicated that she understood all these issues and would agree to proceed with treatment as we deem appropriate.    I, therefore, will follow up  after the aforementioned tests and visits with Medical Oncology and proceed with treatment thereafter.     Thank you very much for allowing me the opportunity to participate in the care of this patient.  If there should be any questions regarding the radiotherapy, please feel free to contact me at any time.    Dictated By Kulwinder Ortiz M.D.  d: 08/20/2024 11:14:40  t: 08/20/2024 11:25:42  [facility] [id number]  NAD/    cc: CYNTHIA Cobos M.D. Elizabeth I Semkiu, D.O. Jeremy J. Sugrue, MD

## 2024-08-20 NOTE — PROGRESS NOTES
Patient here for new consult for cervical cancer. Patient had partial hysterectomy (ovaries intact). Patient completed PET scan last week and MRI today at 3pm. Patient had consult with RT with CT mapping completed.     Education Record    Learner:  Patient    Disease / Diagnosis: new consult cervical cancer    Barriers / Limitations:  None   Comments:    Method:  Discussion   Comments:    General Topics:  Pain and Plan of care reviewed   Comments:    Outcome:  Shows understanding   Comments:

## 2024-08-20 NOTE — CONSULTS
[facility] Report of Consultation    Patient Name: Rubia Ladd   YOB: 1980   Medical Record Number: [id number]   CSN: [id number]   Consulting Physician: Richard Teague MD  Referring Physician(s): Madison Vang  Date of Consultation: 8/20/2024     Reason for Consultation:  Rubia Ladd [facility] for evaluation and management of cervical cancer.    History of Present Illness:     44 year old woman had not had a medical visit for years because of lack of insurance. In 1/2024 she decided to have medical evaluation when she had new insurance coverage. She had complained of hemorrhoid bleeding. She was evaluated by Dr. Lars Olvera. She had excision of rectal mass on 5/24/2024 that showed mucosal prolapse/polyps. There was no evidence of malignancy. She had post op vaginal bleeding. She had evaluation by Dr. Zee on 6/3/2024. US of pelvis showed uterine fibroid. She proceeded to have CHANTEL and bilateral salpingectomy on 7/5/2024. 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 Tumor invaded the deep on third of the cervical stroma and involved the right parametrial tissue. There was focal LVI. The fallopian tubes were negative for carcinoma and margines were negative. She had surgical oncology evaluation by Dr. Gibran Headley and then radiation oncology consultation by Dr. Kulwinder Otriz.    She has been doing well. She has no bone pain. She has no dyspnea or cough. She has no fever or sweats. She has no further bleeding.    She had PET scan on 8/14/2024  that showed SUV 9.4 at the left side of the vaginal stump. There was SUV 5.3 at the right paramidline lymph node in the pelvis just below the bifurcation of the aorta and vena cava. The bilateral adnexa had increased activity SUV 5.3 and 6.9. She has an MRI of the pelvis scheduled today.    Past Medical History:  Past Medical History:    Anal condyloma    nausea  post operatively    Asthma (HCC)    exercise induced as a child-no issues as an adult    Cervical cancer (HCC)    H/O pelvic ultrasound    CONCLUSION:  Stable uterine fibroids, sizable, the largest of which measures up to 7.3 cm.  Measurements given above.  Right ovarian cyst appears simple by ultrasound 5.4 cm maximum dimension.  No pelvic free fluid.    History of urinary reflux    PONV (postoperative nausea and vomiting)    Problems with swallowing    can't swallow pills    Ruptured ovarian cyst    Visual impairment       Past Surgical History:  Past Surgical History:   Procedure Laterality Date    Hysterectomy  2024    TOTAL ABDOMINAL HYSTERECTOMY, BILATERAL SALPINGECTOMY, INTRAOPERATIVE SURGICAL CONSULT, SIGMOIDOSCOPY      8/3/99    Other surgical history      bladder surgery for reflux    Other surgical history      rupture ovarian cyst with bleeding from fallopian tube-still has both ovaries    Other surgical history  2024    ANAL EXAM UNDER ANESTHESIA, EXCISION OF RECTAL MASS, EXCISION AND FULGURATION OF PERIANAL CONDYLOMA       Family Medical History:  Family History   Problem Relation Age of Onset    No Known Problems Father     Other (ovarian cysts) Mother     Alcohol abuse Mother     No Known Problems Daughter     Hypertension Maternal Grandmother     No Known Problems Maternal Grandfather     No Known Problems Paternal Grandmother     No Known Problems Paternal Grandfather     Uterine Cancer Sister         dx age 30s    Pancreatic Cancer Neg     Colon Cancer Neg     Prostate Cancer Neg     Ovarian Cancer Neg     Breast Cancer Neg     Cancer Neg     Endometriosis Neg     Infertility Neg        Gyne History:  OB History    Para Term  AB Living   1 1 1 0 0 1   SAB IAB Ectopic Multiple Live Births   0 0 0 0 1   Obstetric Comments   Menarche: 14 y/o   LMP: 26   OCP x 1month       Psychosocial History:  Social History     Socioeconomic History    Marital status:       Spouse name: Not on file    Number of children: 1    Years of education: Not on file    Highest education level: Not on file   Occupational History    Occupation: on leave of absence   Tobacco Use    Smoking status: Never     Passive exposure: Never    Smokeless tobacco: Never   Vaping Use    Vaping status: Never Used   Substance and Sexual Activity    Alcohol use: Never    Drug use: Never    Sexual activity: Not Currently     Partners: Male     Birth control/protection: Hysterectomy   Other Topics Concern     Service No    Blood Transfusions No    Caffeine Concern No    Occupational Exposure No    Hobby Hazards No    Sleep Concern No    Stress Concern No    Weight Concern No    Special Diet No    Back Care No    Exercise No    Bike Helmet No    Seat Belt No    Self-Exams No   Social History Narrative    , lives with     Has 1 son     Social Determinants of Health     Financial Resource Strain: Not on file   Food Insecurity: No Food Insecurity (7/5/2024)    Food Insecurity     Food Insecurity: Never true   Transportation Needs: No Transportation Needs (7/5/2024)    Transportation Needs     Lack of Transportation: No     Car Seat: Not on file   Physical Activity: Not on file   Stress: Not on file   Social Connections: Not on file   Housing Stability: Low Risk  (7/5/2024)    Housing Stability     Housing Instability: No     Housing Instability Emergency: Not on file     Crib or Bassinette: Not on file       Allergies:   Allergies   Allergen Reactions    Morphine NAUSEA AND VOMITING       Current Medications:    Current Outpatient Medications:     metroNIDAZOLE 0.75 % Vaginal Gel, Place 1 Applicatorful vaginally nightly. (Patient not taking: Reported on 8/9/2024), Disp: 70 g, Rfl: 0    Review of Systems:    Constitutional: Negative for anorexia, fatigue, fevers, chills, night sweats and weight loss.  Eyes: Negative for visual disturbance, irritation and redness.  Respiratory: Negative for cough,  hemoptysis, chest pain, or dyspnea.  Cardiovascular: Negative for angina, orthopnea or palpitations.  Gastrointestinal: Negative for nausea, vomiting, change in bowel habits, diarrhea, constipation and abdominal pain.  Integument/breast: Negative for rash, skin lesions, and pruritus.  Hematologic/lymphatic: Negative for easy bruising, bleeding, and lymphadenopathy.  Musculoskeletal: Negative for myalgias, arthralgias, muscle weakness.  Genitourinary: Negative for dysuria or hematuria  Neurological: Negative for headaches, dizziness, speech problems, gait problems and focal weakness.  Psychiatric: The patient's mood was calm and appropriate for this visit.    The pertinent positives and negatives were described in the HPI and above. All other systems were negative.      Vital Signs:  Height: --  Weight: 86.2 kg (190 lb) (08/20 1250)  BSA (Calculated - sq m): --  Pulse: 93 (08/20 1250)  BP: 144/77 (08/20 1250)  Temp: 98.2 °F (36.8 °C) (08/20 1250)  Do Not Use - Resp Rate: --  SpO2: 100 % (08/20 1250)    Physical Examination:    Constitutional: Patient is alert and oriented x 3, not in acute distress.  HEENT:  Oropharynx is clear. Neck is supple.  Eyes: Anicteric sclera. Pink conjunctiva.  Respiratory: Clear to auscultation and percussion. No rales.  No wheezes.  Cardiovascular: Regular rate and rhythm.   Gastrointestinal: Soft, non tender with good bowel sounds.  Extremities: No edema. No calf tenderness.  Neurological: Grossly intact without focal motor or sensory deficit.  Lymphatics: There is no palpable lymphadenopathy throughout in the cervical, supraclavicular, or axillary regions.    Labs reviewed at this visit:  Lab Results   Component Value Date    WBC 4.8 08/20/2024    RBC 4.06 08/20/2024    HGB 8.5 (L) 08/20/2024    HCT 29.1 (L) 08/20/2024    MCV 71.7 (L) 08/20/2024    MCH 20.9 (L) 08/20/2024    MCHC 29.2 (L) 08/20/2024    RDW 16.4 08/20/2024    .0 08/20/2024     Lab Results   Component Value Date      08/20/2024    K 3.7 08/20/2024     08/20/2024    CO2 26.0 08/20/2024    BUN 16 08/20/2024    CREATSERUM 0.92 08/20/2024    GLU 99 08/20/2024    CA 9.4 08/20/2024    ALKPHO 70 08/20/2024    ALT 7 (L) 08/20/2024    AST 11 08/20/2024    BILT 0.2 (L) 08/20/2024    ALB 4.5 08/20/2024    TP 7.3 08/20/2024         Component  Ref Range & Units 8/20/24 1409   Vitamin B12  211 - 911 pg/mL 829            Component  Ref Range & Units 8/20/24 1409   Ferritin  50.0 - 306.0 ng/mL 4.7 Low            Radiologic imaging reviewed at this visit:    PET scan on 8/14/2024:  FINDINGS:       Hysterectomy.  Elevated uptake at the vaginal stump left of midline SUV maximum 9.4.     10 mm lymph node with uptake 5.4 series 3, image 339 below the vascular bifurcation in the medial right paramidline retroperitoneum.     There is also uptake in the adnexal region bilaterally, SUV maximum on the left 6.9.  This localizes to an ovoid structure.  If the ovary has been left in place after hysterectomy this could reflect the left ovary.     A larger soft tissue structure not fully characterized on this noncontrast exam in the right adnexa 5.3 x 3.5 cm, again on certain if this reflect ovary left in place or other mass, this shows uptake 5.3.     Benign-appearing postsurgical uptake anterior abdominal wall related to the incision.     Otherwise, physiologic activity patterns are seen.  This includes benign-appearing muscular and skeletal uptake present, within multiple locations, without any associated mass or destructive features on the localizer CT images.  There is also symmetric  benign-appearing head and neck uptake.  These uptake patterns are commonly visualized at the FDG uptake sensitivity level of the [facility] PET scan unit.     Small atrophied left kidney with compensatory chronic hypertrophy left kidney.     Impression   CONCLUSION:       Hysterectomy.  Focal uptake SUV maximum 9.4 left side of the vaginal stump.   FDG uptake of this degree could reflect malignancy.  There is also elevated uptake involving a right paramidline lymph node in the pelvis just below the bifurcation of the aorta   and vena cava, SUV maximum 5.3 concerning for metastatic malignancy.  In addition there are soft tissue structures in the adnexa bilaterally with elevated activity.  Uncertain if the hysterectomy most total, or if the ovaries have been left in place.    These could reflect ovaries or other masses.  Advise correlation with surgical history, and consider dedicated contrast imaging of the abdomen and pelvis with intravenous and oral contrast for further assessment.  Appropriate follow-up for this advised.       Assessment/Plan:    Squamous cell carcinoma of the cervix:  HPV+  Clinical T2b staging with deep invasiion of the cervical stroma and parametrial invasion. LVI+    The patient has parametrial involvement which is at least FIGO stage IIB. She had PET scan with suspicious eliel uptake in the pelvis. She will proceed with MRI of the pelvis. We will present her case tomorrow morning to the multidisciplinary GI/Gyne conference with Dr. Headley and Dr. Paul Ortiz with review of the imaging. We would recommend combined radiation and concurrent weekly cisplatin 40 mg/m2 for stage IIB disease. If she had pelvic eliel disease, I would consider adding pembrolizumab to the regimen based on the KEYNOTE-A18 study. I will contact patient after the multidisciplinary discussion.    She will need central venous access for the chemotherapy.    Microcytic Anemia:    I sent repeat labs. She has severe iron deficiency with ferritin less than 10. I would recommend giving her Infed 1000 mg IVPB.          Richard Teague MD

## 2024-08-22 NOTE — PROGRESS NOTES
IV Chemotherapy Education    Drug names: Cisplatin    Learner:  Patient    Chemotherapy education goals:  Learn the drug names  Administration schedule  Routes of administration  Treatment setting    Chemotherapy action on cancer / normal cells    Treatment Effects on Bone Marrow:    Chemotherapy action on cancer / normal cells}  Function of white blood cells / signs of infection  Function of red blood cells / signs of anemia:    Function of platelets / signs of bleeding:    Notify MD/RN of any chills or fever 100.5 and above:     Treatment Effects on Nutritional Status/Mucous Membranes:    Appropriate oral hygiene / signs of stomatitis/oral lesions  Oral rinses procedure    Changes in taste perception / appetite  Nausea and vomiting / use of anti-nausea medications.  Diarrhea / constipation / dietary changes    Treatment Effects on Hair:    Possibility of hair loss     Treatment Effects on Neurological System:    Potential numbness / tingling in hands or feet/Notify MD/RN of any numbness / tingling at next visit    Treatment Effects on the Bladder and Kidneys:    Function of the kidneys and bladder  Signs / symptoms of hemorrhagic cystitis  Suggested fluid intake     Notify MD/RN if blood appears in urine or if you have a decreased urine output     Treatment Effects on Reproductive System:    Avoid pregnancy / use of barrier birth control methods:    Possible sterility, impotence, changes in sex drive:      Treatment Effects on Emotional Status:    Potential mood changes, depression, nervousness, difficulty sleeping  Importance of support system  Notify MD/RN of any emotional changes    Vesicants / Irritants:    Potential extravasation at site of administration   Signs / symptoms include redness, swelling, pain, burning, or blistering at the site of administration   To Notify MD/RN IMMEDIATELY if any of the above signs / symptoms occur         Teaching Materials Provided:      [facility] Chemotherapy information  sheets  When to contact the Treatment Team Information Sheet  Side Effect Management Information Sheet  [place] Support Services Sheet  Dietician information sheet    Patient was given ample opportunity to ask questions.  All questions and concerns addressed.    Chemotherapy Consent Form signed by the patient.    I spent a total of 45 minutes with the patient, 100% of that time was spent counseling patient regarding the above documented side effects and management, when to call provider and contact information.     Encounter Times  PreChartin minutes    Reviewing/Obtaining:   minutes      Medical Exam:   minutes    Plan:   minutes      Notes: 5 minutes    Counseling/Education: 45 minutes      Referring/Communicating:   minutes    Ind Interpretation:   minutes      Care Coordination:   minutes       My total time spent caring for the patient on the day of the encounter: 55 minutes.       Shayy Cooper, SO, APRN, NP-C, AOCNP  Nurse Practitioner  [facility]

## 2024-08-22 NOTE — PROGRESS NOTES
Education Record    Learner:  Patient    Disease / Diagnosis: anemia, here for first Infed infusion    Barriers / Limitations:  None   Comments:    Method:  Discussion   Comments:    General Topics:  Medication, Side effects and symptom management, and Plan of care reviewed   Comments:    Outcome:  Shows understanding   Comments:    Infed test dose given without incident. Patient monitored for 15 minutes post test dose. Patient denies any reaction symptoms.   Remaining Infed dose given over 1 hour without incident.  Vital signs taken at the end of the infusion. Vital sign stable.    Patient discharged in stable condition.

## 2024-08-22 NOTE — PAT NURSING NOTE
PreOp Instructions     You are scheduled for: an Interventional Radiology Procedure     Date of Procedure: 09/11/24. CHECK IN AT 9:00 AM     Diet Instructions: Do not eat or drink anything after midnight     Skin Prep: Shower with antibacterial soap using a clean washcloth, prior to procedure     Driving After Procedure: If sedation is given, you WILL NOT be able to drive home. You will need a responsible adult  to drive you home.     Discharge Teaching: Your nurse will give you specific instructions before discharge, Most people can resume normal activities in 2-3 days, Any questions, please call the physician's office

## 2024-09-03 NOTE — TELEPHONE ENCOUNTER
From: Rubia Ladd  To: Christina Summershayde  Sent: 8/31/2024 7:35 PM CDT  Subject: medical card    I was told to contact you about getting a medical marijuana card. I recently was diagnosed with cervical cancer after my hysterectomy and will start treatment within the next few weeks. Im not sure how long the process takes but I'd like to get started as soon as I can so that when I start chemo and radiation (both external and internal) that I am prepared and legally allowed to purchase for medical use if and when I do need it. I'd rather have the card and (hopefully) not need it than to have symptoms that I cannot manage and not have it. I cannot swallow pills for pain or any other issue that may arise. please let me know if you are able to help with this issue. thank you very much

## 2024-09-11 NOTE — DISCHARGE INSTRUCTIONS
~ Follow up with MDs as scheduled.   ~ Rest today and resume regular activity in the am as tolerated.   ~ No driving or drinking alcohol for 24hrs.   ~ Resume diet and medications.   ~ No lifting more than 10 pounds for 48hrs. Avoid lifting heavy objects such as a purse or a backpack from the shoulder of which the port was placed for 48hrs.   ~ Avoid strenuous activity with the arm of which th port was placed for 48hrs.   ~ Keep bandage completely dry and intact. Small white bandage can be removed tomorrow. Leave large brown bandage on and dry for 5 days.   ~ On day 5, removed brown dressing and clean with soap and water.   ~ Notify MD if any signs of infection... Fever, chills, redness, swelling or drainage.   ~ It is normal to have some discomfort at the incisions ite for the first 24-48hrs. You make take over the counter Tylenol if needed.   ~ Keep port card in your wallet.

## 2024-09-11 NOTE — PROCEDURES
[facility]   part of [facility]  Procedure Note    Rubia Ladd Patient Status:  Outpatient    3/29/1980 MRN [id number]   Location [facility] INTERVENTIONAL SUITES Attending Richard Teague MD    Day # 0 PCP Christina Loredo DO     Procedure: Port placement    Pre-Procedure Diagnosis:  Cervical cancer    Post-Procedure Diagnosis: Same    Anesthesia:  Sedation    Findings:  patent right internal jugular vein    Specimens: None    Blood Loss:  < 5 cc    Tourniquet Time: None  Complications:  None  Drains:  None    Secondary Diagnosis:  N/A    Merissa Alicia MD  2024

## 2024-09-11 NOTE — IVS NOTE
Patient discharged post port placement. VSS. PO intake tolerated. Dressing to right side chest clean dry and intact. AVS reviewed. PIV removed. Discharged via wheelchair with all belongings. Spouse driving home.

## 2024-09-11 NOTE — H&P
[facility]   part of [facility]   History & Physical    Rubia Ladd Patient Status:  Outpatient    3/29/1980 MRN [id number]   Location [facility] INTERVENTIONAL SUITES Attending Richard Teague MD   Hosp Day # 0 PCP Christina Loredo DO     Admitting Diagnosis:   44 year-old female with cervical cancer    History of Present Illness:   44 year-old female with cervical cancer    History   Past Medical History:  Past Medical History:    Anal condyloma    nausea post operatively    Asthma (HCC)    exercise induced as a child-no issues as an adult    Cervical cancer (HCC)    H/O pelvic ultrasound    CONCLUSION:  Stable uterine fibroids, sizable, the largest of which measures up to 7.3 cm.  Measurements given above.  Right ovarian cyst appears simple by ultrasound 5.4 cm maximum dimension.  No pelvic free fluid.    History of urinary reflux    PONV (postoperative nausea and vomiting)    Problems with swallowing    can't swallow pills    Ruptured ovarian cyst    Visual impairment       Past Surgical History:  Past Surgical History:   Procedure Laterality Date    Hysterectomy  2024    TOTAL ABDOMINAL HYSTERECTOMY, BILATERAL SALPINGECTOMY, INTRAOPERATIVE SURGICAL CONSULT, SIGMOIDOSCOPY      8/3/99    Other surgical history      bladder surgery for reflux    Other surgical history      rupture ovarian cyst with bleeding from fallopian tube-still has both ovaries    Other surgical history  2024    ANAL EXAM UNDER ANESTHESIA, EXCISION OF RECTAL MASS, EXCISION AND FULGURATION OF PERIANAL CONDYLOMA       Social History:  Social History     Tobacco Use    Smoking status: Never     Passive exposure: Never    Smokeless tobacco: Never   Substance Use Topics    Alcohol use: Never        Family History:  Family History   Problem Relation Age of Onset    No Known Problems Father     Other (ovarian cysts) Mother     Alcohol abuse Mother     No Known Problems Daughter     Hypertension Maternal  Grandmother     No Known Problems Maternal Grandfather     No Known Problems Paternal Grandmother     No Known Problems Paternal Grandfather     Uterine Cancer Sister         dx age 30s    Pancreatic Cancer Neg     Colon Cancer Neg     Prostate Cancer Neg     Ovarian Cancer Neg     Breast Cancer Neg     Cancer Neg     Endometriosis Neg     Infertility Neg        Allergies/Medications:   Allergies:  Allergies   Allergen Reactions    Morphine NAUSEA AND VOMITING       Medications:  No current outpatient medications on file.    Physical Exam & Review of Systems:   Physical Exam:    /90   Pulse 79   Temp 97.7 °F (36.5 °C)   Resp 25   LMP 06/26/2024 (Exact Date)   SpO2 99%     General: NAD  Neck: No JVD  Cardiac: Normal  Rate  Lungs: Non-labored respirations  Abdomen: Nondistended  Neuro: Alert and oriented    ASA: II  Mallampati: II    Results:   Labs:  No results for input(s): \"RBC\", \"HGB\", \"HCT\", \"MCV\", \"MCH\", \"MCHC\", \"RDW\", \"NEPRELIM\", \"WBC\", \"PLT\" in the last 168 hours.  Recent Labs   Lab 09/11/24  0929   INR 1.1     No results for input(s): \"GLU\", \"BUN\", \"CREATSERUM\", \"GFRAA\", \"GFRNAA\", \"CA\", \"NA\", \"K\", \"CL\", \"CO2\" in the last 168 hours.    Assessment/Plan:   Impression: 44 year-old female with cervical cancer    I have discussed with the patient and/or legal representative the potential benefits, risks, and side effects of this procedure, the likelihood of the patient achieving goals; and the potential problems that might occur during recuperation.  I discussed reasonable alternatives to the procedure, including risks, benefits and side effects related to the alternatives, and risks related to not receiving this procedure.    Recommendations: Port placement    Merissa Alicia MD  9/11/2024  10:01 AM

## 2024-09-17 NOTE — PROGRESS NOTES
Oncology Nutrition Consultation    Patient Name: Rubia Ladd  YOB: 1980  Medical Record Number: [id number]   Account Number: [id number]  Dietitian: Ginger Olivarez RD, LDN    Date of visit: 9/17/2024    Diet Rx: high protein/quality as tolerated; low residue pending diarrhea    Pertinent Dx/PMH: cervical cancer    Past Medical History:    Anal condyloma    nausea post operatively    Asthma (HCC)    exercise induced as a child-no issues as an adult    Cervical cancer (HCC)    H/O pelvic ultrasound    CONCLUSION:  Stable uterine fibroids, sizable, the largest of which measures up to 7.3 cm.  Measurements given above.  Right ovarian cyst appears simple by ultrasound 5.4 cm maximum dimension.  No pelvic free fluid.    History of urinary reflux    PONV (postoperative nausea and vomiting)    Problems with swallowing    can't swallow pills    Ruptured ovarian cyst    Visual impairment       TX: concurrent cisplatin/RT(thru 10/28); s/p CHANTEL/BSO (7/5/24)    Other pertinent subjective/objective information: diet hx obtained    Pertinent Meds:    Current Outpatient Medications:     ondansetron 8 MG Oral Tablet Dispersible, Take 1 tablet (8 mg total) by mouth every 8 (eight) hours as needed for Nausea., Disp: 30 tablet, Rfl: 5    metroNIDAZOLE 0.75 % Vaginal Gel, Place 1 Applicatorful vaginally nightly., Disp: 70 g, Rfl: 0    Pertinent Labs: noted    Height: 5'2.5\"            IBW: 115 +/- 10%    WT HX:   Wt Readings from Last 9 Encounters:   09/17/24 86.8 kg (191 lb 6.4 oz)   08/22/24 86.6 kg (191 lb)   08/20/24 86.2 kg (190 lb)   08/16/24 86.5 kg (190 lb 12.8 oz)   08/09/24 84.8 kg (187 lb)   07/22/24 86.4 kg (190 lb 6.4 oz)   07/20/24 85.7 kg (189 lb)   07/12/24 84.6 kg (186 lb 8 oz)   07/05/24 87.1 kg (192 lb)       Estimated Nutrition Needs: 20-22 kcals/kg = [id number]  KCALS/d; 1.2 gms protein/kg = 105 gms/d    Services Provided: Verbal ix provided addressing -  importance of nutrition during  tx    Assessment/Plan: RD met w/ this pleasant, talkative, 43 y/o female in tx room for introduction, assessment and recommendations.     Pt noted gag reflex w/ certain foods/textures and pills. Pt does not like vegetables. She noted having chickens, raised bed gardens and planted several fruit trees.     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 She noted drinking primarily lemon flavored H2O but had been drinking regular soda.     RD reviewed recommendations as noted encouraging protein source q meal. RD noted soluble fiber sources may help pending diarrhea.     Pt actively participated throughout verbalizing understanding of recommendations made. RD offered support and will continue to monitor throughout tx.     Thank you for allowing me to participate in the care of Rubia.       The 21st Century Cures Act makes medical notes like these available to patients in the interest of transparency. Please be advised this is a medical document. Medical documents are intended to carry relevant information, facts as evident, and the clinical opinion of the practitioner. The medical note is intended as peer to peer communication and may appear blunt or direct. It is written in medical language and may contain abbreviations or verbiage that are unfamiliar.

## 2024-09-17 NOTE — PATIENT INSTRUCTIONS
Anti-Nausea Medication:    Ondansetron (Zofran) -- can take every 8 hours as needed for nausea.      IV Zofran given through your port today at ~ 12:20 pm    Oral Zofran - you can take as early as 8:20 pm tonight (before bed)      You do not need to wake yourself up to take anything. Start up again in the morning.       Zofran may cause headaches and/or constipation.     You may use laxatives/stool softeners (miralax, etc) and tylenol to help with this (try to avoid advil/motrin/ibuprofen).

## 2024-09-17 NOTE — PROGRESS NOTES
Cancer Center Progress Note    Problem List:      Patient Active Problem List   Diagnosis    History of ovarian cyst    External hemorrhoid    Class 1 obesity due to excess calories without serious comorbidity with body mass index (BMI) of 32.0 to 32.9 in adult    Prediabetes    Elevated LDL cholesterol level    Iron deficiency anemia secondary to blood loss (chronic)    Fibroid, uterine    Uterine bleeding    Iron deficiency anemia due to chronic blood loss    Squamous cell carcinoma of cervix (HCC)       Interim History:    Rubia Ladd presents today for evaluation and management of a diagnosis of cervical cancer.    The patient presents for the first cycle of cisplatin and pembrolizumab. She will start radiation today. She has no new complaints. She has no pain. She has no dyspnea or cough. She has no fever or sweats.       She had not had a medical visit for years because of lack of insurance. In 1/2024 she decided to have medical evaluation when she had new insurance coverage. She had complained of hemorrhoid bleeding. She was evaluated by Dr. Lars Olvera. She had excision of rectal mass on 5/24/2024 that showed mucosal prolapse/polyps. There was no evidence of malignancy. She had post op vaginal bleeding. She had evaluation by Dr. Zee on 6/3/2024. US of pelvis showed uterine fibroid. She proceeded to have CHANTEL and bilateral salpingectomy on 7/5/2024. 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 Tumor invaded the deep on third of the cervical stroma and involved the right parametrial tissue. There was focal LVI. The fallopian tubes were negative for carcinoma and margines were negative. She had surgical oncology evaluation by Dr. Gibran Headley and radiation oncology consultation by Dr. Kulwinder Ortiz.     She had PET scan on 8/14/2024  that showed SUV 9.4 at the left side of the vaginal stump. There was SUV 5.3 at the right paramidline lymph node  in the pelvis just below the bifurcation of the aorta and vena cava. The bilateral adnexa had increased activity SUV 5.3 and 6.9.    Review of Systems:   Constitutional: Negative for anorexia, fatigue, fevers, chills, night sweats and weight loss.  Eyes: Negative for visual disturbance, irritation and redness.  Respiratory: Negative for cough, hemoptysis, chest pain, or dyspnea.  Cardiovascular: Negative for angina, orthopnea or palpitations.  Gastrointestinal: Negative for nausea, vomiting, change in bowel habits, diarrhea, constipation and abdominal pain.  Integument/breast: Negative for rash, skin lesions, and pruritus.  Hematologic/lymphatic: Negative for easy bruising, bleeding, and lymphadenopathy.  Musculoskeletal: Negative for myalgias, arthralgias, muscle weakness.  Genitourinary: Negative for dysuria or hematuria  Neurological: Negative for headaches, dizziness, speech problems, gait problems and focal weakness.  Psychiatric: The patient's mood was calm and appropriate for this visit.  The pertinent positives and negatives were described. All other systems were negative.    PMH/PSH:  Past Medical History:    Anal condyloma    nausea post operatively    Asthma (HCC)    exercise induced as a child-no issues as an adult    Cervical cancer (HCC)    H/O pelvic ultrasound    CONCLUSION:  Stable uterine fibroids, sizable, the largest of which measures up to 7.3 cm.  Measurements given above.  Right ovarian cyst appears simple by ultrasound 5.4 cm maximum dimension.  No pelvic free fluid.    History of urinary reflux    PONV (postoperative nausea and vomiting)    Problems with swallowing    can't swallow pills    Ruptured ovarian cyst    Visual impairment       Past Surgical History:   Procedure Laterality Date    Hysterectomy  2024    TOTAL ABDOMINAL HYSTERECTOMY, BILATERAL SALPINGECTOMY, INTRAOPERATIVE SURGICAL CONSULT, SIGMOIDOSCOPY      8/3/99    Other surgical history      bladder surgery for reflux     Other surgical history      rupture ovarian cyst with bleeding from fallopian tube-still has both ovaries    Other surgical history  05/24/2024    ANAL EXAM UNDER ANESTHESIA, EXCISION OF RECTAL MASS, EXCISION AND FULGURATION OF PERIANAL CONDYLOMA       Family History Reviewed:  Family History   Problem Relation Age of Onset    No Known Problems Father     Other (ovarian cysts) Mother     Alcohol abuse Mother     No Known Problems Daughter     Hypertension Maternal Grandmother     No Known Problems Maternal Grandfather     No Known Problems Paternal Grandmother     No Known Problems Paternal Grandfather     Uterine Cancer Sister         dx age 30s    Pancreatic Cancer Neg     Colon Cancer Neg     Prostate Cancer Neg     Ovarian Cancer Neg     Breast Cancer Neg     Cancer Neg     Endometriosis Neg     Infertility Neg        Allergies:     Allergies   Allergen Reactions    Morphine NAUSEA AND VOMITING       Medications:   ondansetron 8 MG Oral Tablet Dispersible Take 1 tablet (8 mg total) by mouth every 8 (eight) hours as needed for Nausea. 30 tablet 5    metroNIDAZOLE 0.75 % Vaginal Gel Place 1 Applicatorful vaginally nightly. 70 g 0         Vital Signs:      Height: 158.8 cm (5' 2.52\") (09/17 0828)  Weight: 86.8 kg (191 lb 6.4 oz) (09/17 0828)  BSA (Calculated - sq m): 1.89 sq meters (09/17 0828)  Pulse: 83 (09/17 0828)  BP: 186/104 (09/17 0828)  Temp: 98 °F (36.7 °C) (09/17 0828)  Do Not Use - Resp Rate: --  SpO2: 98 % (09/17 0828)      Performance Status:  ECOG 0: Fully active, able to carry on all pre-disease performance without restriction     Physical Examination:    Constitutional: Patient is alert and oriented x 3, not in acute distress.  Eyes: Anicteric sclera, pink conjunctiva.  HEENT:  Oropharynx is clear. Neck is supple.  Respiratory: Clear to auscultation and percussion. No rales.  No wheezes.  Cardiovascular: Regular rate and rhythm. No murmurs.  Gastrointestinal: Soft, non tender with good bowel  sounds.  Musculoskeletal: No edema. No calf tenderness.  Neurological: Grossly intact without focal motor or sensory deficit.  Skin: No suspicious skin lesion, no rash, no ulceration.  Lymphatics: There is no palpable lymphadenopathy throughout in the cervical, supraclavicular, or axillary regions.  Psychiatric: The patient's mood is calm and appropriate for this visit.      Labs reviewed at this visit:     Lab Results   Component Value Date    WBC 5.5 09/17/2024    RBC 5.02 09/17/2024    HGB 12.5 09/17/2024    HCT 39.0 09/17/2024    MCV 77.7 (L) 09/17/2024    MCH 24.9 (L) 09/17/2024    MCHC 32.1 09/17/2024    RDW 23.4 09/17/2024    .0 09/17/2024     Lab Results   Component Value Date     09/17/2024    K 3.9 09/17/2024     09/17/2024    CO2 27.0 09/17/2024    BUN 12 09/17/2024    CREATSERUM 0.83 09/17/2024    GLU 76 09/17/2024    CA 9.4 09/17/2024    ALKPHO 75 09/17/2024    ALT 18 09/17/2024    AST 15 09/17/2024    BILT 0.3 09/17/2024    ALB 4.3 09/17/2024    TP 7.2 09/17/2024       Radiologic imaging reviewed at this visit:    MRI Pelvis on 8/20/2024:  FINDINGS:    UTERUS:  The uterus has been removed.  There is infiltrative spiculated mass with irregular enhancement with epicenter at the vaginal cuff.  This is seen on series 4, image 23 to measure 3.4 x 2.1 cm.  On postcontrast images this is noted on series 10, image 23. Spiculated infiltrated irregular enhancement extends into the left adnexa on series 4, image 17 measuring 2.9 x 1.3 cm.  There is an adjacent but separate enhancing nodule near left external iliac vessels seen series 10, image 14 which measures  1.2 x 1.2 cm.  These correspond areas of hyperactivity on the recent PET scan.  OVARIES:  A normal left ovary is not identified separate from the infiltrative mass in the left adnexa.  The right ovary measures 6 x 4 cm.  This measurement includes 2 adjacent cysts in the right ovary.  There is solid enhancing tissue noted between the  2  cystic components of the right ovary which is seen for example on coronal postcontrast images series 11, image 23. This does demonstrate some intermediate signal on the T2 weighted images and was associated with increased activity on the recent PET scan which does raise the possibility of a metastasis or contiguous involvement of the right ovary.  CUL-DE-SAC:  There is postoperative change and irregular enhancing tissue in the rectovesical space.  LYMPH NODES:  Enhancing nodule anterior to the left external iliac vessels is most likely an enlarged lymph node and is associated with activity on the recent PET scan.  Right paramedian lymph node adjacent to common iliac vessels is noted on the T2 sequence series 4, image 11.  This corresponds to the hypermetabolic nodule noted on PET scan.  BLADDER:  No focal wall thickening or mass.  BONES:  Normal for age.  OTHER:  Negative.      Impression   CONCLUSION:    1. Infiltrative enhancing lesions at the cuff of the vagina in this patient who is had a prior hysterectomy extends into the left adnexa with measurements given above.  This is most likely residual squamous cell neoplasm.  2. Separate enhancing nodule anterior to the left external iliac vessels is most likely metastatic lymph node.  3. Multilocular cystic lesion right ovary with associated solid enhancing tissue intervening between the cysts.  This did demonstrate significant uptake on the recent PET scan and is suspicious for metastasis or direct involvement of the right ovary.  4. Hypermetabolic nodule right paramedian location adjacent to right common iliac vessels noted on recent PET scan is described above.        Assessment/Plan:     Squamous cell carcinoma of the cervix:  HPV+  Clinical T2b staging with deep invasiion of the cervical stroma and parametrial invasion. LVI+     The patient has parametrial involvement which is at least FIGO stage IIB. She had PET scan with suspicious eliel uptake in the pelvis.  MRI of pelvis showed left external iliac lymphadenopathy.    We recommend combined radiation and concurrent weekly cisplatin 40 mg/m2 for stage IIB disease. She has pelvic eliel disease, I would recommend adding pembrolizumab to the regimen based on the KEYNOTE-A18 study. I reviewed how this is given and potential side effects of nausea, emesis, low blood counts, infection, and immune toxicity. She has had APN chemo education. All questions were ansered. She will have day 8 APN visit and I will see her in three weeks.      She has had central venous access for the chemotherapy. Continue routine port management.     Microcytic Anemia:  Iron deficiency anemia     Improved HGB after Infed 1000 mg IVPB.    This visit lasted 40 minutes with 35 minutes for discussion of imaging work up, plan and chemotherapy side effects. 5 minutes for post visit charting.    Richard Teague MD

## 2024-09-17 NOTE — PROGRESS NOTES
Patient here for C1D1 keytruda/cisplatin. Patient will start RT today. Patient has  no further concerns or complaints.     Education Record    Learner:  Patient    Disease / Diagnosis: squamous cell carcinoma of cervix     Barriers / Limitations:  None   Comments:    Method:  Discussion   Comments:    General Topics:  Medication, Side effects and symptom management, and Plan of care reviewed   Comments:    Outcome:  Shows understanding   Comments:

## 2024-09-17 NOTE — PROGRESS NOTES
Pt here for C1D1 Drug(s) Keytruda, Cisplatin.  Arrives Ambulating independently, accompanied by Self     Patient was evaluated today by MD and Treatment Nurse.    Oral medications included in this regimen:  no    Patient confirms comprehension of cancer treatment schedule:  yes    Pregnancy screening:  Denies possibility of pregnancy    Modifications in dose or schedule:  No    Medications appearance and physical integrity checked by RN: yes.    Chemotherapy IV pump settings verified by 2 RNs:  Yes.  Frequency of blood return and site check throughout administration: Prior to administration, Prior to each drug, and At completion of therapy     Infusion/treatment outcome:  patient tolerated treatment without incident    Education Record    Learner:  Patient  Barriers / Limitations:  None  Method:  Discussion and Printed material  Education / instructions given:  plan of care, next appts, nausea management  Outcome:  Shows understanding    Discharged Home, Ambulating independently, accompanied by:Self    Patient/family verbalized understanding of future appointments: by printed AVS

## 2024-09-18 NOTE — TELEPHONE ENCOUNTER
Toxicities: C1 D1 Cisplatin/Pembrolizumab with RT on 9/17/2024    Rubia reports she feels \"good.\" She had a headache after treatment yesterday. When she walked down stairs to go to RT she felt tingling by her nose, eyes and forehead. By the time she got into the RT treatment room it had completely resolved. She also reports waking up and 2 am with her 's alarm and could not go back to sleep. I explained that we gave her steroids. They will leave her body in the next 24-48 hrs and her sleep should return to normal. I encouraged her to please call the office if she is not feeling well or she has any questions or concerns. She agreed and thanked me for checking on her.

## 2024-09-23 NOTE — PROGRESS NOTES
[facility] Radiation Treatment Management Note 1-5    Patient:  Rubia Ladd  Age:  44 year old  Visit Diagnosis:    1. Squamous cell carcinoma of cervix (HCC)      Primary Rad/Onc:  Dr. Kulwinder Ortiz    Site Delivered Dose (cGy) Prescribed Dose (cGy) Fraction #   PELVIS [id number] 5/28     First treatment date:   9/17/24  Concurrent chemotherapy:  WEEKLY CHEMO/IO        9/17/2024     8:28 AM 9/17/2024     9:35 AM 9/23/2024     5:37 PM   Oncology Vitals   Height 5' 2.52\"     Height 159 cm     Weight 191 lb 6.4 oz  194 lb 9.6 oz   Weight 86.818 kg  88.27 kg   BSA (m2) 1.89 m2  1.9 m2   BMI 34.43 kg/m2  35 kg/m2   /104 160/114 142/97   Pulse 83  98   Resp   20   Temp 98 °F (36.7 °C)  98.7 °F (37.1 °C)   SpO2 98 %  98 %   Pain Score 0  0        Toxicities:  Fatigue Grade 0= None  Constipation Grade 0= None  Diarrhea  Grade 0= None  Flatulence Grade 0= None  Vaginal bleeding Grade 0= None  Urinary frequency Grade 0= None  Vaginal dryness Grade 0= None  Dysuria on urination Grade 0= None  Urgency on urination Grade 0= None    Nursing Note:  Recent Labs   Lab 09/17/24  0835   RBC 5.02   HGB 12.5   HCT 39.0   MCV 77.7*   MCH 24.9*   MCHC 32.1   RDW 23.4   NEPRELIM 3.26   WBC 5.5   .0     RN ED done with pt:  Reviewed potential side effects of RT and management.  Tolerated first chemo last week.  Met with ANTONIO Miles for dietary consult.     Merle DUNLAP RN    Physician Note:  Subjective:  Doing well, no issues or c/o apart from some urinary frequency.  No dysuria, no bowel issues.      Objective:  Unchanged      Treatment setup imaging have been reviewed:  Yes    Assessment/Plan:    Continue radiotherapy per plan    Next visit:  1 week    Dr. Kulwinder Ortiz

## 2024-09-24 NOTE — PROGRESS NOTES
Pt here for C1D8 Drug(s)Cisplatin.  Arrives Ambulating independently, accompanied by Self     Patient was evaluated today by [facility].    Oral medications included in this regimen:  no    Patient confirms comprehension of cancer treatment schedule:  yes    Pregnancy screening:  Denies possibility of pregnancy    Modifications in dose or schedule:  No    Medications appearance and physical integrity checked by RN: yes.    Chemotherapy IV pump settings verified by 2 RNs:  Yes.  Frequency of blood return and site check throughout administration: Prior to administration and At completion of therapy     Infusion/treatment outcome:  patient tolerated treatment without incident    Education Record    Learner:  Patient  Barriers / Limitations:  None  Method:  Brief focused  Education / instructions given:  schedule reviewed  Outcome:  Shows understanding    Discharged Home, Ambulating independently, accompanied by:Self    Patient/family verbalized understanding of future appointments: by [employer] messaging

## 2024-09-24 NOTE — PROGRESS NOTES
Pt here for C1D8 Keytruda/Cisplatin. Over the last week, pt states she experienced multiple headaches - utilizing 1000mg liquid tylenol for pain management. Multiple episodes of nausea, intermittently utilized antinausea medications. Inquiring if she can receive tylenol before starting treatment today for prevention of headaches.         Education Record    Learner:  Patient    Disease / Diagnosis: Squamous Cell Carcinoma of Cervix    Barriers / Limitations:  None   Comments:    Method:  Discussion   Comments:    General Topics:  Medication, Pain, Side effects and symptom management, and Plan of care reviewed   Comments:    Outcome:  Observed demonstration and Shows understanding   Comments:

## 2024-09-24 NOTE — PROGRESS NOTES
Cancer Center Progress Note    Patient Name: Rubia Ladd   YOB: 1980   Medical Record Number: [id number]   CSN: [id number]   Date of visit: 9/24/2024     Chief Complaint/Reason for Visit:  Chief Complaint   Patient presents with    Follow - Up    Chemotherapy        History of Present Illness: Rubia presents today for follow up of cervical cancer and week 2 of concurrent radiation and chemo-immunotherapy. Last week she started cisplatin and pembrolizumab. Her medical oncologist is Dr. Richard Teague, additional oncology history below.    Today, patient reports she had headaches for about 2 days immediately following chemotherapy. She also has about 2 days of queasiness. She took Zofran ODT with relief. She currently denies any peripheral neuropathy. Energy level is fair. She did not have bowel changes. Appetite is good.     Oncology History:  She had not had a medical visit for years because of lack of insurance. In 1/2024 she decided to have medical evaluation when she had new insurance coverage. She had complained of hemorrhoid bleeding. She was evaluated by Dr. Lars Olvera. She had excision of rectal mass on 5/24/2024 that showed mucosal prolapse/polyps. There was no evidence of malignancy. She had post op vaginal bleeding. She had evaluation by Dr. Zee on 6/3/2024. US of pelvis showed uterine fibroid. She proceeded to have CHANTEL and bilateral salpingectomy on 7/5/2024. 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 Tumor invaded the deep on third of the cervical stroma and involved the right parametrial tissue. There was focal LVI. The fallopian tubes were negative for carcinoma and margines were negative. She had surgical oncology evaluation by Dr. Gibran Headley and radiation oncology consultation by Dr. Kulwinder Ortiz.     She had PET scan on 8/14/2024  that showed SUV 9.4 at the left side of the vaginal stump. There was SUV 5.3 at the  right paramidline lymph node in the pelvis just below the bifurcation of the aorta and vena cava. The bilateral adnexa had increased activity SUV 5.3 and 6.9.      Problem List:  Patient Active Problem List   Diagnosis    History of ovarian cyst    External hemorrhoid    Class 1 obesity due to excess calories without serious comorbidity with body mass index (BMI) of 32.0 to 32.9 in adult    Prediabetes    Elevated LDL cholesterol level    Iron deficiency anemia secondary to blood loss (chronic)    Fibroid, uterine    Uterine bleeding    Iron deficiency anemia due to chronic blood loss    Squamous cell carcinoma of cervix (HCC)        Medical History:  Past Medical History:    Anal condyloma    nausea post operatively    Asthma (HCC)    exercise induced as a child-no issues as an adult    Cervical cancer (HCC)    H/O pelvic ultrasound    CONCLUSION:  Stable uterine fibroids, sizable, the largest of which measures up to 7.3 cm.  Measurements given above.  Right ovarian cyst appears simple by ultrasound 5.4 cm maximum dimension.  No pelvic free fluid.    History of urinary reflux    PONV (postoperative nausea and vomiting)    Problems with swallowing    can't swallow pills    Ruptured ovarian cyst    Visual impairment       Surgical History:  Past Surgical History:   Procedure Laterality Date    Hysterectomy  2024    TOTAL ABDOMINAL HYSTERECTOMY, BILATERAL SALPINGECTOMY, INTRAOPERATIVE SURGICAL CONSULT, SIGMOIDOSCOPY      8/3/99    Other surgical history      bladder surgery for reflux    Other surgical history      rupture ovarian cyst with bleeding from fallopian tube-still has both ovaries    Other surgical history  2024    ANAL EXAM UNDER ANESTHESIA, EXCISION OF RECTAL MASS, EXCISION AND FULGURATION OF PERIANAL CONDYLOMA       Allergies:  Allergies   Allergen Reactions    Morphine NAUSEA AND VOMITING       Family History:  Family History   Problem Relation Age of Onset    No Known Problems Father      Other (ovarian cysts) Mother     Alcohol abuse Mother     No Known Problems Daughter     Hypertension Maternal Grandmother     No Known Problems Maternal Grandfather     No Known Problems Paternal Grandmother     No Known Problems Paternal Grandfather     Uterine Cancer Sister         dx age 30s    Pancreatic Cancer Neg     Colon Cancer Neg     Prostate Cancer Neg     Ovarian Cancer Neg     Breast Cancer Neg     Cancer Neg     Endometriosis Neg     Infertility Neg        Social History:  Social History     Socioeconomic History    Marital status:      Spouse name: Not on file    Number of children: 1    Years of education: Not on file    Highest education level: Not on file   Occupational History    Occupation: on leave of absence   Tobacco Use    Smoking status: Never     Passive exposure: Never    Smokeless tobacco: Never   Vaping Use    Vaping status: Never Used   Substance and Sexual Activity    Alcohol use: Never    Drug use: Never    Sexual activity: Not Currently     Partners: Male     Birth control/protection: Hysterectomy   Other Topics Concern     Service No    Blood Transfusions No    Caffeine Concern No    Occupational Exposure No    Hobby Hazards No    Sleep Concern No    Stress Concern No    Weight Concern No    Special Diet No    Back Care No    Exercise No    Bike Helmet No    Seat Belt No    Self-Exams No   Social History Narrative    , lives with     Has 1 son     Social Determinants of Health     Financial Resource Strain: Not on file   Food Insecurity: No Food Insecurity (7/5/2024)    Food Insecurity     Food Insecurity: Never true   Transportation Needs: No Transportation Needs (7/5/2024)    Transportation Needs     Lack of Transportation: No     Car Seat: Not on file   Physical Activity: Not on file   Stress: Not on file   Social Connections: Not on file   Housing Stability: Low Risk  (7/5/2024)    Housing Stability     Housing Instability: No     Housing  Instability Emergency: Not on file     Crib or Bassinette: Not on file       Medications:    Current Outpatient Medications:     ondansetron 8 MG Oral Tablet Dispersible, Take 1 tablet (8 mg total) by mouth every 8 (eight) hours as needed for Nausea., Disp: 30 tablet, Rfl: 5    metroNIDAZOLE 0.75 % Vaginal Gel, Place 1 Applicatorful vaginally nightly., Disp: 70 g, Rfl: 0    Review of Systems:  A comprehensive 14 point review of systems was completed.  Pertinent positives and negatives noted in the HPI.    Performance Status: ECOG 0    Physical Examination:  General: Patient is alert and oriented x 3, not in acute distress.  Vital Signs: Height: 158.8 cm (5' 2.52\") (09/24 0834)  Weight: 87.7 kg (193 lb 6.4 oz) (09/24 0834)  BSA (Calculated - sq m): 1.9 sq meters (09/24 0834)  Pulse: 92 (09/24 0834)  BP: 153/96 (09/24 0834)  Temp: 97.4 °F (36.3 °C) (09/24 0834)  Do Not Use - Resp Rate: --  SpO2: 98 % (09/24 0834)  HEENT: Anicteric, conjunctivae and sclerae clear, no oropharyngeal lesion/thrush, mucous membranes are moist   Chest: Clear to auscultation. Respirations unlabored.   Heart: Regular rate and rhythm.   Abdomen: Soft, non-distended, non-tender with present bowel sounds.  Extremities: No edema.  Neurological: Grossly intact.   Lymphatics: There is no palpable lymphadenopathy throughout in the cervical or supraclavicular regions.   Skin: warm, dry, no erythema or rash   Psych/Depression: mood and affect are appropriate.     Labs:     Recent Results (from the past 72 hour(s))   CBC W Differential W Platelet    Collection Time: 09/24/24  8:21 AM   Result Value Ref Range    WBC 3.9 (L) 4.0 - 11.0 x10(3) uL    RBC 5.05 3.80 - 5.30 x10(6)uL    HGB 12.8 12.0 - 16.0 g/dL    HCT 39.6 35.0 - 48.0 %    .0 150.0 - 450.0 10(3)uL    MCV 78.4 (L) 80.0 - 100.0 fL    MCH 25.3 (L) 26.0 - 34.0 pg    MCHC 32.3 31.0 - 37.0 g/dL    RDW 22.8 %    Neutrophil Absolute Prelim 2.52 1.50 - 7.70 x10 (3) uL    Neutrophil Absolute  2.52 1.50 - 7.70 x10(3) uL    Lymphocyte Absolute 0.86 (L) 1.00 - 4.00 x10(3) uL    Monocyte Absolute 0.31 0.10 - 1.00 x10(3) uL    Eosinophil Absolute 0.16 0.00 - 0.70 x10(3) uL    Basophil Absolute 0.02 0.00 - 0.20 x10(3) uL    Immature Granulocyte Absolute 0.03 0.00 - 1.00 x10(3) uL    Neutrophil % 64.6 %    Lymphocyte % 22.1 %    Monocyte % 7.9 %    Eosinophil % 4.1 %    Basophil % 0.5 %    Immature Granulocyte % 0.8 %   COMP METABOLIC PANEL [E]    Collection Time: 09/24/24  8:21 AM   Result Value Ref Range    Glucose 98 70 - 99 mg/dL    Sodium 139 136 - 145 mmol/L    Potassium 4.1 3.5 - 5.1 mmol/L    Chloride 108 98 - 112 mmol/L    CO2 26.0 21.0 - 32.0 mmol/L    Anion Gap 5 0 - 18 mmol/L    BUN 21 9 - 23 mg/dL    Creatinine 0.84 0.55 - 1.02 mg/dL    Calcium, Total 9.5 8.7 - 10.4 mg/dL    Calculated Osmolality 291 275 - 295 mOsm/kg    eGFR-Cr 89 >=60 mL/min/1.73m2    AST 15 <34 U/L    ALT 17 10 - 49 U/L    Alkaline Phosphatase 74 37 - 98 U/L    Bilirubin, Total 0.2 (L) 0.3 - 1.2 mg/dL    Total Protein 6.9 5.7 - 8.2 g/dL    Albumin 4.2 3.2 - 4.8 g/dL    Globulin  2.7 2.0 - 3.5 g/dL    A/G Ratio 1.6 1.0 - 2.0    Patient Fasting for CMP? No        Impression/Plan    Squamous cell carcinoma of the cervix: FIGO stage IIB, PET scan showed suspicious eliel uptake in pelvis and MRI pelvis showed left external iliac lymphadenopathy. Started concurrent radiation with weekly cisplatin 40mg/m2 and pembrolizumab last week. She tolerated with expected side effects. Will adjust pre-medications to include Aloxi over Zofran due to headaches.     Planned Follow Up: 1 week follow up with Dr. Teague, labs and chemo    Risk Level: HIGH cervical cancer receiving chemotherapy requiring close monitoring     The 21st Century Cures Act makes medical notes like these available to patients in the interest of transparency. Please be advised this is a medical document. Medical documents are intended to carry relevant information, facts as  evident, and the clinical opinion of the practitioner. The medical note is intended as peer to peer communication and may appear blunt or direct. It is written in medical language and may contain abbreviations or verbiage that are unfamiliar.     Electronically Signed by:    Shayy Cooper DNP, APRN, NP-C, AOCNP  Nurse Practitioner  [facility]

## 2024-09-30 NOTE — PROGRESS NOTES
[facility] Radiation Treatment Management Note 6-10    Patient:  Rubia Ladd  Age:  44 year old  Visit Diagnosis:    1. Squamous cell carcinoma of cervix (HCC)      Primary Rad/Onc:  Dr. Kulwinder Ortiz    Site Delivered Dose (cGy) Prescribed Dose (cGy) Fraction #   PELVIS [id number] 10/28     First treatment date:  9/17/24  Concurrent chemotherapy: WEEKLY CISPLAT; PEMBRO Q3        9/23/2024     5:37 PM 9/24/2024     8:34 AM 9/30/2024     9:16 AM   Oncology Vitals   Height  5' 2.52\"    Height  159 cm    Weight 194 lb 9.6 oz 193 lb 6.4 oz 193 lb 3.2 oz   Weight 88.27 kg 87.726 kg 87.635 kg   BSA (m2) 1.9 m2 1.9 m2 1.89 m2   BMI 35 kg/m2 34.79 kg/m2 34.75 kg/m2   /97 153/96 169/107   Pulse 98 92 90   Resp 20 18 18   Temp 98.7 °F (37.1 °C) 97.4 °F (36.3 °C) 98.7 °F (37.1 °C)   SpO2 98 % 98 % 98 %   Pain Score 0 0 0        Toxicities:  Fatigue Grade 1= Fatigue relieved by rest  Constipation Grade 0= None  Diarrhea  Grade 0= None  Flatulence Grade 0= None  Vaginal bleeding Grade 0= None  Urinary frequency Grade 0= None  Vaginal dryness Grade 0= None  Dysuria on urination Grade 0= None  Urgency on urination Grade 0= None      Nursing Note:  Recent Labs   Lab 09/24/24  0821   RBC 5.05   HGB 12.8   HCT 39.6   MCV 78.4*   MCH 25.3*   MCHC 32.3   RDW 22.8   NEPRELIM 2.52   WBC 3.9*   .0     Pt has noticed some abdominal discomfort  Some stool urgency, but no diarrhea  Took Pepto this AM  No urinary symptoms      Alyssa DUNLAP RN    Physician Note:  Subjective:  Doing well overall.  Toleraing RT without difficulties.  Some N&V from chemo.  Occ looser stools, tolerable.  No urinary issues.      Objective:  Unchanged      Treatment setup imaging have been reviewed:  Yes    Assessment/Plan:    Continue radiotherapy per plan    Next visit:  1 week    Dr. Kulwinder Ortiz

## 2024-10-01 NOTE — PROGRESS NOTES
Pt here for C 1 D 15  Drug(s)cisplatin.  Arrives Ambulating independently, accompanied by Self     Patient was evaluated today by Treatment Nurse.    Oral medications included in this regimen:  no    Patient confirms comprehension of cancer treatment schedule:  yes    Pregnancy screening:  Denies possibility of pregnancy    Modifications in dose or schedule:  No    Medications appearance and physical integrity checked by RN: yes.    Chemotherapy IV pump settings verified by 2 RNs:  Yes.  Frequency of blood return and site check throughout administration: Prior to administration and At completion of therapy     Infusion/treatment outcome:  patient tolerated treatment without incident    Education Record    Learner:  Patient  Barriers / Limitations:  None  Method:  Discussion  Education / instructions given:  nausea medication schedule  Outcome:  Shows understanding    Discharged Home, Ambulating independently, accompanied by:Self    Patient/family verbalized understanding of future appointments: by [employer] messaging

## 2024-10-04 NOTE — PROGRESS NOTES
[facility] Radiation Treatment Management Note 11-15    Patient:  Rubia Ladd  Age:  44 year old  Visit Diagnosis:    1. Squamous cell carcinoma of cervix (HCC)      Primary Rad/Onc:  Dr. Kulwinder Ortiz    Site Delivered Dose (cGy) Prescribed Dose (cGy) Fraction #   PELVIS [id number] 14/28     First treatment date:   9/17/24  Concurrent chemotherapy:  WEEKLY CHEMO/IO        9/30/2024     9:16 AM 10/1/2024     8:06 AM 10/7/2024     8:20 AM   Oncology Vitals   Height  5' 2.205\"    Height  158 cm    Weight 193 lb 3.2 oz 192 lb 6.4 oz 191 lb 9.6 oz   Weight 87.635 kg 87.272 kg 86.909 kg   BSA (m2) 1.89 m2 1.89 m2 1.88 m2   BMI 34.75 kg/m2 34.96 kg/m2 34.81 kg/m2   /107 153/97 153/95   Pulse 90 94 114   Resp 18 18 20   Temp 98.7 °F (37.1 °C) 97.6 °F (36.4 °C) 97.3 °F (36.3 °C)   SpO2 98 % 95 % 98 %   Pain Score 0 0 2        Toxicities:  Fatigue Grade 1= Fatigue relieved by rest  Constipation Grade 0= None  Diarrhea  Grade 1= Increase of <4 stools per day over baseline; mild increase in ostomy output compared to baseline   Flatulence Grade 1= Mild symptoms; intervention not indicated  Vaginal bleeding Grade 0= None  Urinary frequency Grade 1= Present  Vaginal dryness Grade 0= None  Dysuria on urination Grade 0= None  Urgency on urination Grade 0= None  Dermatitis associated with radiation Grade 2= Moderate to brisk erythema, patchy moist desquamation mostly confined to skin folds and creases; moderate edema  Moisturizer used  Barrier Cream  .    Nursing Note:  Pt seen pre-treatment, will be at tx#15:    Recent Labs   Lab 10/01/24  0804   RBC 5.08   HGB 12.8   HCT 38.9   MCV 76.6*   MCH 25.2*   MCHC 32.9   RDW 21.9   NEPRELIM 2.41   WBC 3.5*   .0*     C/O intermittent diarrhea and abd cramping.   Taking Pepto-Bismol PRN with minimal relief.  Recommended Imodium PRN.  C/O rectal irritation and occ bleeding from wiping.  Using squeeze bottle for cleaning.  Recommended sitz baths and  barrier cream PRN.  Appetite low, occ nausea.    Merle DUNLAP RN    Physician Note:  Subjective:  Doing well overall.  Having some looser stools as per RN note.  Has been using pepto with little relief.  No urinary issues.  Appetite fair, some fatigue.      Objective:  Skin irritation, grade 1      Treatment setup imaging have been reviewed:  Yes    Assessment/Plan:    Barrier cream    Sitz baths    Imodium prn    Continue radiotherapy per plan    Next visit:  1 week    Dr. Kulwinder Ortiz

## 2024-10-08 NOTE — PROGRESS NOTES
Oncology Nutrition F/U Consultation     Patient Name: Rubia Ladd  YOB: 1980  Medical Record Number: [id number]            Account Number: [id number]  Dietitian: Ginger Olivarez RD, LDN     Date of visit: 10/8/2024     Diet Rx: high protein/quality as tolerated; low residue pending diarrhea     Pertinent Dx/PMH: cervical cancer     Past Medical History       Past Medical History:    Anal condyloma     nausea post operatively    Asthma (HCC)     exercise induced as a child-no issues as an adult    Cervical cancer (HCC)    H/O pelvic ultrasound     CONCLUSION:  Stable uterine fibroids, sizable, the largest of which measures up to 7.3 cm.  Measurements given above.  Right ovarian cyst appears simple by ultrasound 5.4 cm maximum dimension.  No pelvic free fluid.    History of urinary reflux    PONV (postoperative nausea and vomiting)    Problems with swallowing     can't swallow pills    Ruptured ovarian cyst    Visual impairment            TX: concurrent cisplatin/RT(thru 10/28); s/p CHANTEL/BSO (7/5/24)     Other pertinent subjective/objective information: diet hx obtained     Pertinent Meds:    Medications - Current      Current Outpatient Medications:     ondansetron 8 MG Oral Tablet Dispersible, Take 1 tablet (8 mg total) by mouth every 8 (eight) hours as needed for Nausea., Disp: 30 tablet, Rfl: 5    metroNIDAZOLE 0.75 % Vaginal Gel, Place 1 Applicatorful vaginally nightly., Disp: 70 g, Rfl: 0        Pertinent Labs: noted     Height: 5'2.5\"                       IBW: 115 +/- 10%     WT HX:   10/08/24 88.2 kg (194 lb 6.4 oz)   09/17/24 86.8 kg (191 lb 6.4 oz)   08/20/24 86.2 kg (190 lb)   08/16/24 86.5 kg (190 lb 12.8 oz)   08/09/24 84.8 kg (187 lb)   07/20/24 85.7 kg (189 lb)   07/05/24 87.1 kg (192 lb)         Estimated Nutrition Needs: 20-22 kcals/kg = [id number]  KCALS/d; 1.2 gms protein/kg = 105 gms/d     Assessment/Plan: RD f/u in tx room today. Pt noted diligently making sure protein  requirements are met. She noted some N and D but manageable.     RD offered support and will continue to monitor throughout tx.      The 21st Century Cures Act makes medical notes like these available to patients in the interest of transparency. Please be advised this is a medical document. Medical documents are intended to carry relevant information, facts as evident, and the clinical opinion of the practitioner. The medical note is intended as peer to peer communication and may appear blunt or direct. It is written in medical language and may contain abbreviations or verbiage that are unfamiliar.

## 2024-10-08 NOTE — PROGRESS NOTES
Patient here for C1D22 cisplat/keytruda. Patient c/o headaches, fatigue, nausea. Patient states she has a full feeling in her stomach even if she hasn't eaten. Patient states she has dyspnea with exertion for last 3 weeks. Patient denies cough, flu symptoms, fever, chills. Patient c/o hot flashes. Patient denies pain. Patient is on daily RT.     Education Record    Learner:  Patient    Disease / Diagnosis: carcinoma of cervix     Barriers / Limitations:  None   Comments:    Method:  Discussion   Comments:    General Topics:  Medication, Side effects and symptom management, and Plan of care reviewed   Comments:    Outcome:  Shows understanding   Comments:

## 2024-10-08 NOTE — PROGRESS NOTES
Pt here for C1D22 Drug(s)Keytruda/Cisplatin.  Arrives Ambulating independently, accompanied by Self     Patient was evaluated today by MD and Treatment Nurse.    Oral medications included in this regimen:  no    Patient confirms comprehension of cancer treatment schedule:  yes    Pregnancy screening:  Denies possibility of pregnancy    Modifications in dose or schedule:  No    Medications appearance and physical integrity checked by RN: yes.    Chemotherapy IV pump settings verified by 2 RNs:  Yes.  Frequency of blood return and site check throughout administration: Prior to administration     Infusion/treatment outcome:  patient tolerated treatment without incident    Education Record    Learner:  Patient  Barriers / Limitations:  None  Method:  Brief focused  Education / instructions given:  patient  Outcome:  Shows understanding    Discharged Home, Ambulating independently, accompanied by:Self    Patient/family verbalized understanding of future appointments: by printed AVS      Tolerated treatment well.

## 2024-10-08 NOTE — PROGRESS NOTES
Cancer Center Progress Note    Problem List:      Patient Active Problem List   Diagnosis    History of ovarian cyst    External hemorrhoid    Class 1 obesity due to excess calories without serious comorbidity with body mass index (BMI) of 32.0 to 32.9 in adult    Prediabetes    Elevated LDL cholesterol level    Iron deficiency anemia secondary to blood loss (chronic)    Fibroid, uterine    Uterine bleeding    Iron deficiency anemia due to chronic blood loss    Squamous cell carcinoma of cervix (HCC)       Interim History:    Rubia Ladd presents today for evaluation and management of a diagnosis of cervical cancer.    The patient presents for the first cycle of cisplatin and pembrolizumab. She has had fatigue and some intermittent nausea. She has had intermittent headache. She has no emesis. She has no fever or sweats. She has no other pain. She has no dyspnea or cough. She has no fever or sweats.     Oncology history: In 1/2024 she decided to have medical evaluation when she had new insurance coverage. She had complained of hemorrhoid bleeding. She was evaluated by Dr. Lars Olvera. She had excision of rectal mass on 5/24/2024 that showed mucosal prolapse/polyps. There was no evidence of malignancy. She had post op vaginal bleeding. She had evaluation by Dr. Zee on 6/3/2024. US of pelvis showed uterine fibroid. She proceeded to have CHANTEL and bilateral salpingectomy on 7/5/2024. 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 Tumor invaded the deep on third of the cervical stroma and involved the right parametrial tissue. There was focal LVI. The fallopian tubes were negative for carcinoma and margines were negative. She had surgical oncology evaluation by Dr. Gibran Headley and radiation oncology consultation by Dr. Kulwinder Ortiz.     She had PET scan on 8/14/2024  that showed SUV 9.4 at the left side of the vaginal stump. There was SUV 5.3 at the right  paramidline lymph node in the pelvis just below the bifurcation of the aorta and vena cava. The bilateral adnexa had increased activity SUV 5.3 and 6.9.    Review of Systems:   Constitutional: Negative for anorexia, fevers, chills, night sweats and weight loss.  Eyes: Negative for visual disturbance, irritation and redness.  Respiratory: Negative for cough, hemoptysis, chest pain  Cardiovascular: Negative for angina, orthopnea or palpitations.  Gastrointestinal: Negative for vomiting, change in bowel habits, diarrhea, constipation and abdominal pain.  Integument/breast: Negative for rash, skin lesions, and pruritus.  Hematologic/lymphatic: Negative for easy bruising, bleeding, and lymphadenopathy.  Musculoskeletal: Negative for myalgias, arthralgias, muscle weakness.  Genitourinary: Negative for dysuria or hematuria  Neurological: Negative for headaches, dizziness, speech problems, gait problems and focal weakness.  Psychiatric: The patient's mood was calm and appropriate for this visit.  The pertinent positives and negatives were described. All other systems were negative.    PMH/PSH:  Past Medical History:    Anal condyloma    nausea post operatively    Asthma (HCC)    exercise induced as a child-no issues as an adult    Cervical cancer (HCC)    H/O pelvic ultrasound    CONCLUSION:  Stable uterine fibroids, sizable, the largest of which measures up to 7.3 cm.  Measurements given above.  Right ovarian cyst appears simple by ultrasound 5.4 cm maximum dimension.  No pelvic free fluid.    History of urinary reflux    PONV (postoperative nausea and vomiting)    Problems with swallowing    can't swallow pills    Ruptured ovarian cyst    Visual impairment       Past Surgical History:   Procedure Laterality Date    Hysterectomy  2024    TOTAL ABDOMINAL HYSTERECTOMY, BILATERAL SALPINGECTOMY, INTRAOPERATIVE SURGICAL CONSULT, SIGMOIDOSCOPY      8/3/99    Other surgical history      bladder surgery for reflux     Other surgical history      rupture ovarian cyst with bleeding from fallopian tube-still has both ovaries    Other surgical history  05/24/2024    ANAL EXAM UNDER ANESTHESIA, EXCISION OF RECTAL MASS, EXCISION AND FULGURATION OF PERIANAL CONDYLOMA       Family History Reviewed:  Family History   Problem Relation Age of Onset    No Known Problems Father     Other (ovarian cysts) Mother     Alcohol abuse Mother     No Known Problems Daughter     Hypertension Maternal Grandmother     No Known Problems Maternal Grandfather     No Known Problems Paternal Grandmother     No Known Problems Paternal Grandfather     Uterine Cancer Sister         dx age 30s    Pancreatic Cancer Neg     Colon Cancer Neg     Prostate Cancer Neg     Ovarian Cancer Neg     Breast Cancer Neg     Cancer Neg     Endometriosis Neg     Infertility Neg        Allergies:     Allergies   Allergen Reactions    Morphine NAUSEA AND VOMITING       Medications:   ondansetron 8 MG Oral Tablet Dispersible Take 1 tablet (8 mg total) by mouth every 8 (eight) hours as needed for Nausea. 30 tablet 5         Vital Signs:      Height: 158 cm (5' 2.21\") (10/08 0800)  Weight: 88.2 kg (194 lb 6.4 oz) (10/08 0800)  BSA (Calculated - sq m): 1.89 sq meters (10/08 0800)  Pulse: 109 (10/08 0800)  BP: 159/98 (10/08 0800)  Temp: 97.6 °F (36.4 °C) (10/08 0800)  Do Not Use - Resp Rate: --  SpO2: 99 % (10/08 0800)      Performance Status:  ECOG 0: Fully active, able to carry on all pre-disease performance without restriction     Physical Examination:    Constitutional: Patient is alert and not in acute distress.  Respiratory: Clear to auscultation and percussion. No rales.  No wheezes.  Cardiovascular: Regular rate and rhythm. No murmurs.  Gastrointestinal: Soft, non tender with good bowel sounds.  Musculoskeletal: No edema. No calf tenderness.  Lymphatics: There is no palpable lymphadenopathy throughout in the cervical, supraclavicular, or axillary regions.  Psychiatric: The  patient's mood is calm and appropriate for this visit.      Labs reviewed at this visit:     Lab Results   Component Value Date    WBC 3.6 (L) 10/08/2024    RBC 4.95 10/08/2024    HGB 12.5 10/08/2024    HCT 38.3 10/08/2024    MCV 77.4 (L) 10/08/2024    MCH 25.3 (L) 10/08/2024    MCHC 32.6 10/08/2024    RDW 21.7 10/08/2024    .0 (L) 10/08/2024     Lab Results   Component Value Date     10/01/2024    K 3.9 10/01/2024     10/01/2024    CO2 26.0 10/01/2024    BUN 15 10/01/2024    CREATSERUM 0.83 10/01/2024     (H) 10/01/2024    CA 9.8 10/01/2024    ALKPHO 71 10/01/2024    ALT 17 10/01/2024    AST 13 10/01/2024    BILT 0.3 10/01/2024    ALB 4.4 10/01/2024    TP 6.8 10/01/2024       Radiologic imaging reviewed at this visit:    MRI Pelvis on 8/20/2024:  FINDINGS:    UTERUS:  The uterus has been removed.  There is infiltrative spiculated mass with irregular enhancement with epicenter at the vaginal cuff.  This is seen on series 4, image 23 to measure 3.4 x 2.1 cm.  On postcontrast images this is noted on series 10, image 23. Spiculated infiltrated irregular enhancement extends into the left adnexa on series 4, image 17 measuring 2.9 x 1.3 cm.  There is an adjacent but separate enhancing nodule near left external iliac vessels seen series 10, image 14 which measures  1.2 x 1.2 cm.  These correspond areas of hyperactivity on the recent PET scan.  OVARIES:  A normal left ovary is not identified separate from the infiltrative mass in the left adnexa.  The right ovary measures 6 x 4 cm.  This measurement includes 2 adjacent cysts in the right ovary.  There is solid enhancing tissue noted between the  2 cystic components of the right ovary which is seen for example on coronal postcontrast images series 11, image 23. This does demonstrate some intermediate signal on the T2 weighted images and was associated with increased activity on the recent PET scan which does raise the possibility of a metastasis or  contiguous involvement of the right ovary.  CUL-DE-SAC:  There is postoperative change and irregular enhancing tissue in the rectovesical space.  LYMPH NODES:  Enhancing nodule anterior to the left external iliac vessels is most likely an enlarged lymph node and is associated with activity on the recent PET scan.  Right paramedian lymph node adjacent to common iliac vessels is noted on the T2 sequence series 4, image 11.  This corresponds to the hypermetabolic nodule noted on PET scan.  BLADDER:  No focal wall thickening or mass.  BONES:  Normal for age.  OTHER:  Negative.      Impression   CONCLUSION:    1. Infiltrative enhancing lesions at the cuff of the vagina in this patient who is had a prior hysterectomy extends into the left adnexa with measurements given above.  This is most likely residual squamous cell neoplasm.  2. Separate enhancing nodule anterior to the left external iliac vessels is most likely metastatic lymph node.  3. Multilocular cystic lesion right ovary with associated solid enhancing tissue intervening between the cysts.  This did demonstrate significant uptake on the recent PET scan and is suspicious for metastasis or direct involvement of the right ovary.  4. Hypermetabolic nodule right paramedian location adjacent to right common iliac vessels noted on recent PET scan is described above.        Assessment/Plan:     Squamous cell carcinoma of the cervix:  HPV+  Clinical T2b staging with deep invasiion of the cervical stroma and parametrial invasion. LVI+     The patient has parametrial involvement which is at least FIGO stage IIB. She had PET scan with suspicious eliel uptake in the pelvis. MRI of pelvis showed left external iliac lymphadenopathy.    We will continue with combined radiation and concurrent weekly cisplatin 40 mg/m2 for stage IIB disease. She is getting concurrent pembrolizumab for clinical pelvic side wall disease based on KEYNOTE-A18 study.    She will get the second  pembrolizumab dose today. She will continue with three more weeks of weekly cisplatin. She will continue with the same support meds.    She has seen Dr. JESSICA Ornelas. She has follow up with him with repeat MRI of pelvis for further management decisions.     She has central venous access for the chemotherapy. Continue routine port management.     Thrombocytopenia:  Mild lymphocytopenia:    Secondary to chemotherapy. Follow with weekly cbc. The blood counts are adequate for continued treatment.    Microcytic Anemia:  Iron deficiency anemia     Improved HGB after Infed 1000 mg IVPB.      Richard Teague MD

## 2024-10-11 NOTE — PROGRESS NOTES
[facility] Radiation Treatment Management Note 16-20    Patient:  Rubia Ladd  Age:  44 year old  Visit Diagnosis:    1. Squamous cell carcinoma of cervix (HCC)      Primary Rad/Onc:  Dr. Kulwinder Ortiz    Site Delivered Dose (cGy) Prescribed Dose (cGy) Fraction #   PELVIS 3600 5040 20/28   PELVIS BOOST 0 360 0/2     First treatment date:  9/17/24  Concurrent chemotherapy: WEEKLY CHEMO/IO        10/7/2024     8:20 AM 10/8/2024     8:00 AM 10/14/2024     8:47 AM   Oncology Vitals   Height  5' 2.205\"    Height  158 cm    Weight 191 lb 9.6 oz 194 lb 6.4 oz 193 lb 6.4 oz   Weight 86.909 kg 88.179 kg 87.726 kg   BSA (m2) 1.88 m2 1.89 m2 1.89 m2   BMI 34.81 kg/m2 35.32 kg/m2 35.14 kg/m2   /95 159/98    Pulse 114 109 101   Resp 20 17 18   Temp 97.3 °F (36.3 °C) 97.6 °F (36.4 °C) 98 °F (36.7 °C)   SpO2 98 % 99 % 95 %   Pain Score 2 0 0        Toxicities:  Fatigue Grade 2= Fatigue not relieved by rest limiting instrumental ADL  Constipation Grade 0= None  Diarrhea  Grade 0= None  Flatulence Grade 0= None  Vaginal bleeding Grade 0= None  Urinary frequency Grade 0= None  Vaginal dryness Grade 0= None  Dysuria on urination Grade 0= None  Urgency on urination Grade 0= None    Wt Readings from Last 6 Encounters:   10/14/24 87.7 kg (193 lb 6.4 oz)   10/08/24 88.2 kg (194 lb 6.4 oz)   10/07/24 86.9 kg (191 lb 9.6 oz)   10/01/24 87.3 kg (192 lb 6.4 oz)   09/30/24 87.6 kg (193 lb 3.2 oz)   09/24/24 87.7 kg (193 lb 6.4 oz)       Nursing Note:  Recent Labs   Lab 10/08/24  0752   RBC 4.95   HGB 12.5   HCT 38.3   MCV 77.4*   MCH 25.3*   MCHC 32.6   RDW 21.7   NEPRELIM 2.47   WBC 3.6*   .0*     Pt states she's feeling well today  No diarrhea x 1 week  States rectal discomfort much improved  Using Aloe Vesta  No rectal bleeding  No vaginal discharge  Has MRI on Friday with Nara  Has appt at [place] on 10/25/24      Alyssa DUNLAP RN    Physician Note:  Subjective:  Doing well, no c/o.  No bowel or  urinary issues.      Objective:  Unchanged      Treatment setup imaging have been reviewed:  Yes    Assessment/Plan:    MRI Friday    Meets with Jess Licona and Ceci next week to assess response and determine needles vs cylinder for brachy    Continue radiotherapy per plan    Next visit:  1 week    Dr. Kulwinder Ortiz

## 2024-10-15 NOTE — PROGRESS NOTES
Education Record    Learner:  Patient    Disease / Diagnosis: Cervical ca    Barriers / Limitations:  None   Comments:    Method:  Discussion   Comments:    General Topics:  Plan of care reviewed and Fall risk and prevention   Comments:    Outcome:  Shows understanding    Comments:    CBC results discussed with Naomie NOGUERA and . No treatment today due to thrombocytopenia. Pt will return in 1 week. Discharged ambulatory in stable condition.

## 2024-10-21 NOTE — PROGRESS NOTES
[facility] Radiation Treatment Management Note 21-25    Patient:  Rubia Ladd  Age:  44 year old  Visit Diagnosis:    1. Squamous cell carcinoma of cervix (HCC)      Primary Rad/Onc:  Dr. Kulwinder Ortiz    Site Delivered Dose (cGy) Prescribed Dose (cGy) Fraction #   PELVIS 4500 5040 25/28   PELVIS BOOST 0 360 0/2     First treatment date:   9/17/24  Concurrent chemotherapy:  WEEKLY CHEMO/IO        10/14/2024     8:47 AM 10/15/2024     9:08 AM 10/21/2024     8:27 AM   Oncology Vitals   Height  5' 2.205\"    Height  158 cm    Weight 193 lb 6.4 oz 197 lb 197 lb 6.4 oz   Weight 87.726 kg 89.359 kg 89.54 kg   BSA (m2) 1.89 m2 1.9 m2 1.91 m2   BMI 35.14 kg/m2 35.79 kg/m2 35.87 kg/m2   BP  169/107 144/95   Pulse 101 106 98   Resp 18 16 20   Temp 98 °F (36.7 °C) 97.7 °F (36.5 °C) 98.3 °F (36.8 °C)   SpO2 95 % 97 % 97 %   Pain Score 0  0      Toxicities:  Fatigue Grade 1= Fatigue relieved by rest  Constipation Grade 0= None  Diarrhea  Grade 0= None  Flatulence Grade 0= None  Vaginal bleeding Grade 0= None  Urinary frequency Grade 1= Present  Vaginal dryness Grade 0= None  Dysuria on urination Grade 0= None  Urgency on urination Grade 1= Present  Dermatitis associated with radiation Grade 1= Faint erythema or dry desquamation  Moisturizer used  Barrier Cream  applied Number of times: 2 times daily.    Nursing Note:  Recent Labs   Lab 10/15/24  0910   RBC 4.61   HGB 11.9*   HCT 34.9*   MCV 75.7*   MCH 25.8*   MCHC 34.1   RDW 20.9   NEPRELIM 2.50   WBC 3.4*   PLT 79.0*     Had MRI at [place] on 10/18.  Will meet with Dr. Licona and Dr. Ornelas on 10/25.  C/O pressure after urinating, denies hematuria or burning sensation.  Occ abdominal cramping, BM regular.     Merle DUNLAP RN MD NOTE   Reviewed and agree with RN note above.  Setup imaging reviewed in [facility] and approved.    S:  -no vaginal bleeding. Some LUTS, but resolved. No diarrhea.     O:  -no changes    A/P:  -finish out chemoRT to pelvis  -to  see docs at [facility] to plan brachy  -let us know if any new LUTS, can send UA    Otv 1 week    Kathryn Montano MD  Radiation Oncology

## 2024-10-22 NOTE — PROGRESS NOTES
Pt arrived amb for C1 D29 Cisplatin.  Platelets dropped again (76).  Dr. Teague ordered for tx to be held again this week.  Pt is scheduled next week for tx on 10/29.  Port de-accessed per protocol.  Pt left amb.

## 2024-10-25 NOTE — PROGRESS NOTES
[facility] Radiation Treatment Management Note 26-30    Patient:  Rubia Ladd  Age:  44 year old  Visit Diagnosis:    1. Squamous cell carcinoma of cervix (HCC)      Primary Rad/Onc:  Dr. Kulwinder Ortiz    Site Delivered Dose (cGy) Prescribed Dose (cGy) Fraction #   PELVIS 5040 5040 28/28   PELVIS BOOST 360 360 2/2     First treatment date:   9/17/24  Concurrent chemotherapy:  WEEKLY CHEMO/IO        10/21/2024     8:27 AM 10/22/2024     8:57 AM 10/28/2024     8:43 AM   Oncology Vitals   Height  5' 2.205\"    Height  158 cm    Weight 197 lb 6.4 oz 196 lb 9.6 oz 197 lb 6.4 oz   Weight 89.54 kg 89.177 kg 89.54 kg   BSA (m2) 1.91 m2 1.9 m2 1.91 m2   BMI 35.87 kg/m2 35.72 kg/m2 35.87 kg/m2   /95 153/93 142/93   Pulse 98 104 99   Resp 20 16 20   Temp 98.3 °F (36.8 °C) 97.7 °F (36.5 °C) 98.4 °F (36.9 °C)   SpO2 97 % 96 % 99 %   Pain Score 0 0 3        Toxicities:  Fatigue Grade 1= Fatigue relieved by rest  Constipation Grade 0= None  Diarrhea  Grade 0= None  Flatulence Grade 0= None  Vaginal bleeding Grade 0= None  Urinary frequency Grade 0= None  Vaginal dryness Grade 0= None  Dysuria on urination Grade 1= Present  Urgency on urination Grade 0= None  Dermatitis associated with radiation Grade 1= Faint erythema or dry desquamation  Moisturizer used  Barrier Cream  applied Number of times: PRN  times daily.    Nursing Note:  Recent Labs   Lab 10/22/24  0912   RBC 4.13   HGB 10.8*   HCT 31.4*   MCV 76.0*   MCH 26.2   MCHC 34.4   RDW 21.8   NEPRELIM 1.44*   WBC 2.0*   PLT 76.0*     Pt completes RT today, AVS to give.   Saw Dr. Licona and Dr. Ornelas on 10/25.   Plan to do interstitial tx starting on 11/11.  Still with bladder pressure, levar when standing/walking.  Denies hematuria.  Had UA and C/S at [place], no UTI.  Occ soft stools, no c/o rectal irritation.  Denies vaginal discharge.    Merle DUNLAP RN    Physician Note:  Subjective:  DOing well, completed EBRT today.  Tolerated well.  Some  urinary pressure, no pain.  Recent UTI test negative.  No bowel issues.  + fatigue, tolerable.      Objective:   Unchanged      Treatment setup imaging have been reviewed:  Yes    Assessment/Plan:    Completed EBRT    Will get interstitial at [place] (small)    Next visit:  f/u 3 months    Dr. Kulwinder Ortiz

## 2024-10-28 NOTE — PATIENT INSTRUCTIONS
- WE WILL CALL TO SCHEDULE YOU FOR A FOLLOW-UP WITH DR. VALERIA MORALES IN 3 MONTHS.  - SIDE EFFECTS OF RADIATION WILL GRADUALLY SUBSIDE. IT MAY TAKE 1- 2 WEEKS POST-RADIATION FOR YOU TO NOTICE CHANGES SUCH AS A DECREASE IN YOUR FATIGUE LEVEL, DECREASE IN PELVIC DISCOMFORT/PRESSURE, DECREASE IN URINARY CHANGES (FREQUENCY, PRESSURE/BURNING SENSATION), DECREASE IN VAGINAL DISCHARGE, DECREASE IN BOWEL CHANGES (FREQUENCY, LOOSE STOOLS).   - CALL THE NURSE LINE AT [phone] IF YOU HAVE ANY QUESTIONS/CONCERNS REGARDING RADIATION THERAPY.

## 2024-10-29 NOTE — PROGRESS NOTES
Patient here for C1D29 Cisplatin for cervical cancer. Last 2 chemo days have been delayed. Patient completed external RT 10/28/24. Patient c/o pain in her bladder/urethra. Patient states she had a UA Friday that was negative for UTI.     Education Record    Learner:  Patient    Disease / Diagnosis: cervical cancer     Barriers / Limitations:  None   Comments:    Method:  Discussion   Comments:    General Topics:  Medication, Pain, Side effects and symptom management, and Plan of care reviewed   Comments:    Outcome:  Shows understanding   Comments:

## 2024-10-29 NOTE — PROGRESS NOTES
Cancer Center Progress Note    Problem List:      Patient Active Problem List   Diagnosis    History of ovarian cyst    External hemorrhoid    Class 1 obesity due to excess calories without serious comorbidity with body mass index (BMI) of 32.0 to 32.9 in adult    Prediabetes    Elevated LDL cholesterol level    Iron deficiency anemia secondary to blood loss (chronic)    Fibroid, uterine    Uterine bleeding    Iron deficiency anemia due to chronic blood loss    Squamous cell carcinoma of cervix (HCC)       Interim History:    Rubia Ladd presents today for evaluation and management of a diagnosis of cervical cancer.    The patient has had four weekly doses of cisplatin. She has been getting pembrolizumab 200 mg every three weeks. The last two weeks of cisplatin have been held due to thrombocytopenia. She complains of dysuria. She had negative UA and culture yesterday. She has no gross hematuria. She says she is managing the pain with liquid tylenol. She has a pill swallowing phobia. She has no other new pain. She has no fever or sweats. She has completed radiation yesterday.     Oncology history: In 1/2024 she decided to have medical evaluation when she had new insurance coverage. She had complained of hemorrhoid bleeding. She was evaluated by Dr. Lars Olvera. She had excision of rectal mass on 5/24/2024 that showed mucosal prolapse/polyps. There was no evidence of malignancy. She had post op vaginal bleeding. She had evaluation by Dr. Zee on 6/3/2024. US of pelvis showed uterine fibroid. She proceeded to have CHANTEL and bilateral salpingectomy on 7/5/2024. 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 Tumor invaded the deep on third of the cervical stroma and involved the right parametrial tissue. There was focal LVI. The fallopian tubes were negative for carcinoma and margines were negative. She had surgical oncology evaluation by Dr. Gibran Headley and  radiation oncology consultation by Dr. Kulwinder Ortiz.     She had PET scan on 8/14/2024  that showed SUV 9.4 at the left side of the vaginal stump. There was SUV 5.3 at the right paramidline lymph node in the pelvis just below the bifurcation of the aorta and vena cava. The bilateral adnexa had increased activity SUV 5.3 and 6.9.    Review of Systems:   Constitutional: Negative for anorexia, fevers, chills, night sweats and weight loss.  Eyes: Negative for visual disturbance, irritation and redness.  Respiratory: Negative for cough, hemoptysis, chest pain  Cardiovascular: Negative for angina, orthopnea or palpitations.  Gastrointestinal: Negative for vomiting, change in bowel habits, diarrhea, constipation and abdominal pain.  Integument/breast: Negative for rash, skin lesions, and pruritus.  Hematologic/lymphatic: Negative for easy bruising, bleeding, and lymphadenopathy.  Musculoskeletal: Negative for myalgias, arthralgias, muscle weakness.  Neurological: Negative for headaches, dizziness, speech problems, gait problems and focal weakness.  Psychiatric: The patient's mood was calm and appropriate for this visit.  The pertinent positives and negatives were described. All other systems were negative.    PMH/PSH:  Past Medical History:    Anal condyloma    nausea post operatively    Asthma (HCC)    exercise induced as a child-no issues as an adult    Cervical cancer (HCC)    H/O pelvic ultrasound    CONCLUSION:  Stable uterine fibroids, sizable, the largest of which measures up to 7.3 cm.  Measurements given above.  Right ovarian cyst appears simple by ultrasound 5.4 cm maximum dimension.  No pelvic free fluid.    History of urinary reflux    PONV (postoperative nausea and vomiting)    Problems with swallowing    can't swallow pills    Ruptured ovarian cyst    Visual impairment       Past Surgical History:   Procedure Laterality Date    Hysterectomy  07/05/2024    TOTAL ABDOMINAL HYSTERECTOMY, BILATERAL  SALPINGECTOMY, INTRAOPERATIVE SURGICAL CONSULT, SIGMOIDOSCOPY      8/3/99    Other surgical history      bladder surgery for reflux    Other surgical history      rupture ovarian cyst with bleeding from fallopian tube-still has both ovaries    Other surgical history  2024    ANAL EXAM UNDER ANESTHESIA, EXCISION OF RECTAL MASS, EXCISION AND FULGURATION OF PERIANAL CONDYLOMA       Family History Reviewed:  Family History   Problem Relation Age of Onset    No Known Problems Father     Other (ovarian cysts) Mother     Alcohol abuse Mother     No Known Problems Daughter     Hypertension Maternal Grandmother     No Known Problems Maternal Grandfather     No Known Problems Paternal Grandmother     No Known Problems Paternal Grandfather     Uterine Cancer Sister         dx age 30s    Pancreatic Cancer Neg     Colon Cancer Neg     Prostate Cancer Neg     Ovarian Cancer Neg     Breast Cancer Neg     Cancer Neg     Endometriosis Neg     Infertility Neg        Allergies:     Allergies   Allergen Reactions    Morphine NAUSEA AND VOMITING       Medications:   ondansetron 8 MG Oral Tablet Dispersible Take 1 tablet (8 mg total) by mouth every 8 (eight) hours as needed for Nausea. 30 tablet 5         Vital Signs:      Height: 158 cm (5' 2.21\") (10/29 0804)  Weight: 88.8 kg (195 lb 12.8 oz) (10/29 0804)  BSA (Calculated - sq m): 1.9 sq meters (10/29 0804)  Pulse: 122 (10/29 0804)  BP: 142/89 (10/29 0804)  Temp: 98 °F (36.7 °C) (10/29 0804)  Do Not Use - Resp Rate: --  SpO2: 96 % (10/29 0804)      Performance Status:  ECOG 0: Fully active, able to carry on all pre-disease performance without restriction     Physical Examination:    Constitutional: Patient is alert and not in acute distress.  Respiratory: Clear to auscultation and percussion. No rales.  No wheezes.  Cardiovascular: Regular rate and rhythm. No murmurs.  Gastrointestinal: Soft, non tender with good bowel sounds.  Musculoskeletal: No edema. No calf  tenderness.  Lymphatics: There is no palpable lymphadenopathy throughout in the cervical, supraclavicular, or axillary regions.  Psychiatric: The patient's mood is calm and appropriate for this visit.      Labs reviewed at this visit:     Lab Results   Component Value Date    WBC 2.2 (L) 10/29/2024    RBC 4.03 10/29/2024    HGB 10.7 (L) 10/29/2024    HCT 31.7 (L) 10/29/2024    MCV 78.7 (L) 10/29/2024    MCH 26.6 10/29/2024    MCHC 33.8 10/29/2024    RDW 23.8 10/29/2024    .0 10/29/2024     Lab Results   Component Value Date     10/29/2024    K 3.4 (L) 10/29/2024     10/29/2024    CO2 26.0 10/29/2024    BUN 13 10/29/2024    CREATSERUM 0.88 10/29/2024     (H) 10/29/2024    CA 9.7 10/29/2024    ALKPHO 71 10/29/2024    ALT 21 10/29/2024    AST 18 10/29/2024    BILT 0.5 10/29/2024    ALB 4.1 10/29/2024    TP 6.9 10/29/2024       Radiologic imaging reviewed at this visit:    MRI Pelvis on 8/20/2024:  FINDINGS:    UTERUS:  The uterus has been removed.  There is infiltrative spiculated mass with irregular enhancement with epicenter at the vaginal cuff.  This is seen on series 4, image 23 to measure 3.4 x 2.1 cm.  On postcontrast images this is noted on series 10, image 23. Spiculated infiltrated irregular enhancement extends into the left adnexa on series 4, image 17 measuring 2.9 x 1.3 cm.  There is an adjacent but separate enhancing nodule near left external iliac vessels seen series 10, image 14 which measures  1.2 x 1.2 cm.  These correspond areas of hyperactivity on the recent PET scan.  OVARIES:  A normal left ovary is not identified separate from the infiltrative mass in the left adnexa.  The right ovary measures 6 x 4 cm.  This measurement includes 2 adjacent cysts in the right ovary.  There is solid enhancing tissue noted between the  2 cystic components of the right ovary which is seen for example on coronal postcontrast images series 11, image 23. This does demonstrate some intermediate  signal on the T2 weighted images and was associated with increased activity on the recent PET scan which does raise the possibility of a metastasis or contiguous involvement of the right ovary.  CUL-DE-SAC:  There is postoperative change and irregular enhancing tissue in the rectovesical space.  LYMPH NODES:  Enhancing nodule anterior to the left external iliac vessels is most likely an enlarged lymph node and is associated with activity on the recent PET scan.  Right paramedian lymph node adjacent to common iliac vessels is noted on the T2 sequence series 4, image 11.  This corresponds to the hypermetabolic nodule noted on PET scan.  BLADDER:  No focal wall thickening or mass.  BONES:  Normal for age.  OTHER:  Negative.      Impression   CONCLUSION:    1. Infiltrative enhancing lesions at the cuff of the vagina in this patient who is had a prior hysterectomy extends into the left adnexa with measurements given above.  This is most likely residual squamous cell neoplasm.  2. Separate enhancing nodule anterior to the left external iliac vessels is most likely metastatic lymph node.  3. Multilocular cystic lesion right ovary with associated solid enhancing tissue intervening between the cysts.  This did demonstrate significant uptake on the recent PET scan and is suspicious for metastasis or direct involvement of the right ovary.  4. Hypermetabolic nodule right paramedian location adjacent to right common iliac vessels noted on recent PET scan is described above.        Assessment/Plan:     Squamous cell carcinoma of the cervix:  HPV+  Clinical T2b staging with deep invasiion of the cervical stroma and parametrial invasion. LVI+     The patient has parametrial involvement which is at least FIGO stage IIB. She had PET scan with suspicious eliel uptake in the pelvis. MRI of pelvis showed left external iliac lymphadenopathy.    She has completed combined radiation and concurrent weekly cisplatin 40 mg/m2 for stage IIB  disease. She is getting concurrent pembrolizumab for clinical pelvic side wall disease based on KEYNOTE-A18 study.    She will get the third pembrolizumab dose today. We will stop the chemotherapy. She will now proceed with [facility] onc for interstitial radiation.     I will see her in three weeks to continue the pembrolizumab. We will consider switching to the every six week dosing at that time.    She continues to follow with Dr. JESSICA Ornelas for gyne onc  follow up.     She has central venous access for the chemotherapy. Continue routine port management.     Thrombocytopenia:  Mild lymphocytopenia:    Secondary to chemotherapy. The platelets are improved. Continue to follow with repeat labs in three weeks.    Microcytic Anemia:  Iron deficiency anemia     Improved HGB after Infed 1000 mg IVPB.      Richard Teague MD

## 2024-10-29 NOTE — PROGRESS NOTES
Pt here for C1D1 Drug(s) maintenance pembrolizumab.  Arrives Ambulating independently, accompanied by Self     Patient was evaluated today by MD.    Oral medications included in this regimen:  no    Patient confirms comprehension of cancer treatment schedule:  yes    Pregnancy screening:  Denies possibility of pregnancy    Modifications in dose or schedule:  Yes - starting maintenance keytruda    Medications appearance and physical integrity checked by RN: yes.    Chemotherapy IV pump settings verified by 2 RNs:  No due to targeted therapy IV administration.  Frequency of blood return and site check throughout administration: Prior to administration and At completion of therapy     Infusion/treatment outcome:  patient tolerated treatment without incident    Education Record    Learner:  Patient  Barriers / Limitations:  None  Method:  Discussion  Education / instructions given:  plan of care, next appts  Outcome:  Shows understanding    Discharged Home, Ambulating independently, accompanied by:Self    Patient/family verbalized understanding of future appointments: by [employer] messaging

## 2024-11-01 NOTE — TELEPHONE ENCOUNTER
Progress notes received from [facility] and placed in provider's office for review

## 2024-11-26 NOTE — PROGRESS NOTES
Pt here for Keytruda.  Arrives Ambulating independently, accompanied by Self     Patient was evaluated today by MD.    Oral medications included in this regimen:  no    Patient confirms comprehension of cancer treatment schedule:  yes    Pregnancy screening:  Denies possibility of pregnancy    Modifications in dose or schedule:  No-Patient will be changing to every 6 week dosing (400 mg)  starting 12/18 per Dr. Teague.     Medications appearance and physical integrity checked by RN: yes.    Chemotherapy IV pump settings verified by 2 RNs:  No due to targeted therapy IV administration.  Frequency of blood return and site check throughout administration: Prior to administration and At completion of therapy     Infusion/treatment outcome:  patient tolerated treatment without incident    Education Record    Learner:  Patient  Barriers / Limitations:  None  Method:  Discussion  Education / instructions given:  Medication, medication side effects, date of next appointment  Outcome:  Shows understanding    Discharged Home, Ambulating independently, accompanied by:Self    Patient/family verbalized understanding of future appointments: by [employer] messaging

## 2024-11-26 NOTE — PROGRESS NOTES
Patient here for C2D1 Keytruda. Patient completed internal RT 11/11-13 at [facility]. Patient has f/u with Dr Ornelas 12/17.      Education Record    Learner:  Patient    Disease / Diagnosis: squaous cell carcinoma of cervix    Barriers / Limitations:  None   Comments:    Method:  Discussion   Comments:    General Topics:  Medication, Pain, Side effects and symptom management, and Plan of care reviewed   Comments:    Outcome:  Shows understanding   Comments:

## 2024-11-26 NOTE — PROGRESS NOTES
Cancer Center Progress Note    Problem List:      Patient Active Problem List   Diagnosis    History of ovarian cyst    External hemorrhoid    Class 1 obesity due to excess calories without serious comorbidity with body mass index (BMI) of 32.0 to 32.9 in adult    Prediabetes    Elevated LDL cholesterol level    Iron deficiency anemia secondary to blood loss (chronic)    Fibroid, uterine    Uterine bleeding    Iron deficiency anemia due to chronic blood loss    Squamous cell carcinoma of cervix (HCC)       Interim History:    Rubia Ladd presents today for evaluation and management of a diagnosis of cervical cancer.    The patient has completed radiation and concurrent weekly doses of cisplatin. She has been getting pembrolizumab 200 mg every three weeks. She had interstitial brachytherapy at [facility] from 11/12/ to 11/13/2024. She is here to continue the pembrolizumab.    She is doing better now. She has some lower abdominal pain. She has good appetite. She has no fever or sweats. She has normal bm's. She has no dyspnea or cough.    Oncology history: In 1/2024 she decided to have medical evaluation when she had new insurance coverage. She had complained of hemorrhoid bleeding. She was evaluated by Dr. Lars Olvera. She had excision of rectal mass on 5/24/2024 that showed mucosal prolapse/polyps. There was no evidence of malignancy. She had post op vaginal bleeding. She had evaluation by Dr. Zee on 6/3/2024. US of pelvis showed uterine fibroid. She proceeded to have CHANTEL and bilateral salpingectomy on 7/5/2024. 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 Tumor invaded the deep on third of the cervical stroma and involved the right parametrial tissue. There was focal LVI. The fallopian tubes were negative for carcinoma and margines were negative. She had surgical oncology evaluation by Dr. Gibran Headley and radiation oncology consultation by Dr. Kulwinder Miller  [place].     She had PET scan on 2024  that showed SUV 9.4 at the left side of the vaginal stump. There was SUV 5.3 at the right paramidline lymph node in the pelvis just below the bifurcation of the aorta and vena cava. The bilateral adnexa had increased activity SUV 5.3 and 6.9.    Review of Systems:   Constitutional: Negative for anorexia, fevers, chills, night sweats and weight loss.  Eyes: Negative for visual disturbance, irritation and redness.  Respiratory: Negative for cough, hemoptysis, chest pain  Cardiovascular: Negative for angina, orthopnea or palpitations.  Gastrointestinal: Negative for vomiting, change in bowel habits, diarrhea, constipation  Integument/breast: Negative for rash, skin lesions, and pruritus.  Hematologic/lymphatic: Negative for easy bruising, bleeding, and lymphadenopathy.  Musculoskeletal: Negative for myalgias, arthralgias, muscle weakness.  Neurological: Negative for headaches, dizziness, speech problems, gait problems and focal weakness.  Psychiatric: The patient's mood was calm and appropriate for this visit.  The pertinent positives and negatives were described. All other systems were negative.    PMH/PSH:  Past Medical History:    Anal condyloma    nausea post operatively    Asthma (HCC)    exercise induced as a child-no issues as an adult    Cervical cancer (HCC)    H/O pelvic ultrasound    CONCLUSION:  Stable uterine fibroids, sizable, the largest of which measures up to 7.3 cm.  Measurements given above.  Right ovarian cyst appears simple by ultrasound 5.4 cm maximum dimension.  No pelvic free fluid.    History of urinary reflux    PONV (postoperative nausea and vomiting)    Problems with swallowing    can't swallow pills    Ruptured ovarian cyst    Visual impairment       Past Surgical History:   Procedure Laterality Date    Hysterectomy  2024    TOTAL ABDOMINAL HYSTERECTOMY, BILATERAL SALPINGECTOMY, INTRAOPERATIVE SURGICAL CONSULT, SIGMOIDOSCOPY      8/3/99     Other surgical history      bladder surgery for reflux    Other surgical history      rupture ovarian cyst with bleeding from fallopian tube-still has both ovaries    Other surgical history  05/24/2024    ANAL EXAM UNDER ANESTHESIA, EXCISION OF RECTAL MASS, EXCISION AND FULGURATION OF PERIANAL CONDYLOMA       Family History Reviewed:  Family History   Problem Relation Age of Onset    No Known Problems Father     Other (ovarian cysts) Mother     Alcohol abuse Mother     No Known Problems Daughter     Hypertension Maternal Grandmother     No Known Problems Maternal Grandfather     No Known Problems Paternal Grandmother     No Known Problems Paternal Grandfather     Uterine Cancer Sister         dx age 30s    Pancreatic Cancer Neg     Colon Cancer Neg     Prostate Cancer Neg     Ovarian Cancer Neg     Breast Cancer Neg     Cancer Neg     Endometriosis Neg     Infertility Neg        Allergies:     Allergies   Allergen Reactions    Morphine NAUSEA AND VOMITING       Medications:  No outpatient medications have been marked as taking for the 11/26/24 encounter (Office Visit) with Richard Teague MD.         Vital Signs:      Height: 158 cm (5' 2.21\") (11/26 1114)  Weight: 90.1 kg (198 lb 9.6 oz) (11/26 1114)  BSA (Calculated - sq m): 1.91 sq meters (11/26 1114)  Pulse: 99 (11/26 1114)  BP: 152/104 (11/26 1114)  Temp: 98 °F (36.7 °C) (11/26 1114)  Do Not Use - Resp Rate: --  SpO2: 97 % (11/26 1114)      Performance Status:  ECOG 0: Fully active, able to carry on all pre-disease performance without restriction     Physical Examination:    Constitutional: Patient is alert and not in acute distress.  Respiratory: Clear to auscultation and percussion. No rales.  No wheezes.  Cardiovascular: Regular rate and rhythm. No murmurs.  Gastrointestinal: Soft, non tender with good bowel sounds.  Musculoskeletal: No edema. No calf tenderness.  Lymphatics: There is no palpable lymphadenopathy throughout in the cervical, supraclavicular,  or axillary regions.  Psychiatric: The patient's mood is calm and appropriate for this visit.      Labs reviewed at this visit:     Lab Results   Component Value Date    WBC 5.0 11/26/2024    RBC 3.52 (L) 11/26/2024    HGB 10.2 (L) 11/26/2024    HCT 29.9 (L) 11/26/2024    MCV 84.9 11/26/2024    MCH 29.0 11/26/2024    MCHC 34.1 11/26/2024    RDW 22.9 11/26/2024    .0 11/26/2024     Lab Results   Component Value Date     11/26/2024    K 3.5 11/26/2024     11/26/2024    CO2 28.0 11/26/2024    BUN 13 11/26/2024    CREATSERUM 0.86 11/26/2024     (H) 11/26/2024    CA 9.4 11/26/2024    ALKPHO 77 11/26/2024    ALT 26 11/26/2024    AST 25 11/26/2024    BILT 0.5 11/26/2024    ALB 4.1 11/26/2024    TP 6.7 11/26/2024       Radiologic imaging reviewed at this visit:    MRI Pelvis on 8/20/2024:  FINDINGS:    UTERUS:  The uterus has been removed.  There is infiltrative spiculated mass with irregular enhancement with epicenter at the vaginal cuff.  This is seen on series 4, image 23 to measure 3.4 x 2.1 cm.  On postcontrast images this is noted on series 10, image 23. Spiculated infiltrated irregular enhancement extends into the left adnexa on series 4, image 17 measuring 2.9 x 1.3 cm.  There is an adjacent but separate enhancing nodule near left external iliac vessels seen series 10, image 14 which measures  1.2 x 1.2 cm.  These correspond areas of hyperactivity on the recent PET scan.  OVARIES:  A normal left ovary is not identified separate from the infiltrative mass in the left adnexa.  The right ovary measures 6 x 4 cm.  This measurement includes 2 adjacent cysts in the right ovary.  There is solid enhancing tissue noted between the  2 cystic components of the right ovary which is seen for example on coronal postcontrast images series 11, image 23. This does demonstrate some intermediate signal on the T2 weighted images and was associated with increased activity on the recent PET scan which does raise  the possibility of a metastasis or contiguous involvement of the right ovary.  CUL-DE-SAC:  There is postoperative change and irregular enhancing tissue in the rectovesical space.  LYMPH NODES:  Enhancing nodule anterior to the left external iliac vessels is most likely an enlarged lymph node and is associated with activity on the recent PET scan.  Right paramedian lymph node adjacent to common iliac vessels is noted on the T2 sequence series 4, image 11.  This corresponds to the hypermetabolic nodule noted on PET scan.  BLADDER:  No focal wall thickening or mass.  BONES:  Normal for age.  OTHER:  Negative.      Impression   CONCLUSION:    1. Infiltrative enhancing lesions at the cuff of the vagina in this patient who is had a prior hysterectomy extends into the left adnexa with measurements given above.  This is most likely residual squamous cell neoplasm.  2. Separate enhancing nodule anterior to the left external iliac vessels is most likely metastatic lymph node.  3. Multilocular cystic lesion right ovary with associated solid enhancing tissue intervening between the cysts.  This did demonstrate significant uptake on the recent PET scan and is suspicious for metastasis or direct involvement of the right ovary.  4. Hypermetabolic nodule right paramedian location adjacent to right common iliac vessels noted on recent PET scan is described above.        Assessment/Plan:     Squamous cell carcinoma of the cervix:  HPV+  Clinical T2b staging with deep invasiion of the cervical stroma and parametrial invasion. LVI+     The patient has parametrial involvement which is at least FIGO stage IIB. She had PET scan with suspicious eliel uptake in the pelvis. MRI of pelvis showed left external iliac lymphadenopathy.    She has completed combined radiation and concurrent weekly cisplatin 40 mg/m2 for stage IIB disease. She had interstitial brachytherapy at [facility] (11/12-11/13/2024). She will now continue with maintenance  pembrolizumab. She will get a 200 mg dose now but then we will switch to the 400 mg every 6 week dosing. She was comfortable with this plan. She has no immune related side effects.    She continues to follow with Dr. JESSICA Ornelas for gyne onc  follow up.     She has central venous access for the chemotherapy. Continue routine port management.    Microcytic Anemia:  Iron deficiency anemia     Improved HGB after Infed 1000 mg IVPB. Will repeat the CBC in three weeks.      Richard Tegaue MD

## 2024-12-18 NOTE — PROGRESS NOTES
Cancer Center Progress Note    Problem List:      Patient Active Problem List   Diagnosis    History of ovarian cyst    External hemorrhoid    Class 1 obesity due to excess calories without serious comorbidity with body mass index (BMI) of 32.0 to 32.9 in adult    Prediabetes    Elevated LDL cholesterol level    Iron deficiency anemia secondary to blood loss (chronic)    Fibroid, uterine    Uterine bleeding    Iron deficiency anemia due to chronic blood loss    Squamous cell carcinoma of cervix (HCC)       Interim History:    Rubia Ladd presents today for evaluation and management of a diagnosis of cervical cancer.    The patient has completed radiation and concurrent weekly doses of cisplatin. She has been getting pembrolizumab 200 mg every three weeks. She had interstitial brachytherapy at [facility] from 11/12/ to 11/13/2024. She is here to continue the pembrolizumab.    She has minimal lower abdominal pain when she coughs. She was seen at [facility] yesterday by Dr. Ornelas and Dr. Licona. She has an order for PET scan in two months (end of February). She has good appetite. She has no fever or sweats. She has normal bm's. She has no dyspnea or cough.    Oncology history: In 1/2024 she decided to have medical evaluation when she had new insurance coverage. She had complained of hemorrhoid bleeding. She was evaluated by Dr. Lars Olvera. She had excision of rectal mass on 5/24/2024 that showed mucosal prolapse/polyps. There was no evidence of malignancy. She had post op vaginal bleeding. She had evaluation by Dr. Zee on 6/3/2024. US of pelvis showed uterine fibroid. She proceeded to have CHANTEL and bilateral salpingectomy on 7/5/2024. 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 Tumor invaded the deep on third of the cervical stroma and involved the right parametrial tissue. There was focal LVI. The fallopian tubes were negative for carcinoma and margines were  negative. She had surgical oncology evaluation by Dr. Gibran Headley and radiation oncology consultation by Dr. Kulwinder Ortiz.     She had PET scan on 8/14/2024  that showed SUV 9.4 at the left side of the vaginal stump. There was SUV 5.3 at the right paramidline lymph node in the pelvis just below the bifurcation of the aorta and vena cava. The bilateral adnexa had increased activity SUV 5.3 and 6.9.    Review of Systems:   Constitutional: Negative for anorexia, fevers, chills, night sweats and weight loss.  Eyes: Negative for visual disturbance, irritation and redness.  Respiratory: Negative for cough, hemoptysis, chest pain  Cardiovascular: Negative for angina, orthopnea or palpitations.  Gastrointestinal: Negative for vomiting, change in bowel habits, diarrhea, constipation  Integument/breast: Negative for rash, skin lesions, and pruritus.  Hematologic/lymphatic: Negative for easy bruising, bleeding, and lymphadenopathy.  Musculoskeletal: Negative for myalgias, arthralgias, muscle weakness.  Neurological: Negative for headaches, dizziness, speech problems, gait problems and focal weakness.  Psychiatric: The patient's mood was calm and appropriate for this visit.  The pertinent positives and negatives were described. All other systems were negative.    PMH/PSH:  Past Medical History:    Anal condyloma    nausea post operatively    Asthma (HCC)    exercise induced as a child-no issues as an adult    Cervical cancer (HCC)    H/O pelvic ultrasound    CONCLUSION:  Stable uterine fibroids, sizable, the largest of which measures up to 7.3 cm.  Measurements given above.  Right ovarian cyst appears simple by ultrasound 5.4 cm maximum dimension.  No pelvic free fluid.    History of urinary reflux    PONV (postoperative nausea and vomiting)    Problems with swallowing    can't swallow pills    Ruptured ovarian cyst    Visual impairment       Past Surgical History:   Procedure Laterality Date    Hysterectomy  07/05/2024     TOTAL ABDOMINAL HYSTERECTOMY, BILATERAL SALPINGECTOMY, INTRAOPERATIVE SURGICAL CONSULT, SIGMOIDOSCOPY      8/3/99    Other surgical history      bladder surgery for reflux    Other surgical history      rupture ovarian cyst with bleeding from fallopian tube-still has both ovaries    Other surgical history  2024    ANAL EXAM UNDER ANESTHESIA, EXCISION OF RECTAL MASS, EXCISION AND FULGURATION OF PERIANAL CONDYLOMA       Family History Reviewed:  Family History   Problem Relation Age of Onset    No Known Problems Father     Other (ovarian cysts) Mother     Alcohol abuse Mother     No Known Problems Daughter     Hypertension Maternal Grandmother     No Known Problems Maternal Grandfather     No Known Problems Paternal Grandmother     No Known Problems Paternal Grandfather     Uterine Cancer Sister         dx age 30s    Pancreatic Cancer Neg     Colon Cancer Neg     Prostate Cancer Neg     Ovarian Cancer Neg     Breast Cancer Neg     Cancer Neg     Endometriosis Neg     Infertility Neg        Allergies:     Allergies   Allergen Reactions    Morphine NAUSEA AND VOMITING       Medications:   ondansetron 8 MG Oral Tablet Dispersible Take 1 tablet (8 mg total) by mouth every 8 (eight) hours as needed for Nausea. 30 tablet 5         Vital Signs:      Height: 158 cm (5' 2.21\") (823)  Weight: 89.6 kg (197 lb 9.6 oz) (823)  BSA (Calculated - sq m): 1.91 sq meters (823)  Pulse: 95 (823)  BP: 152/94 (823)  Temp: 97.9 °F (36.6 °C) (823)  Do Not Use - Resp Rate: --  SpO2: 99 % (823)      Performance Status:  ECOG 0: Fully active, able to carry on all pre-disease performance without restriction     Physical Examination:    Constitutional: Patient is alert and not in acute distress.  Respiratory: Clear to auscultation and percussion. No rales.  No wheezes.  Cardiovascular: Regular rate and rhythm. No murmurs.  Gastrointestinal: Soft, non tender with good bowel  sounds.  Musculoskeletal: No edema. No calf tenderness.  Lymphatics: There is no palpable lymphadenopathy throughout in the cervical, supraclavicular, or axillary regions.  Psychiatric: The patient's mood is calm and appropriate for this visit.      Labs reviewed at this visit:     Lab Results   Component Value Date    WBC 5.1 12/18/2024    RBC 4.16 12/18/2024    HGB 12.5 12/18/2024    HCT 36.7 12/18/2024    MCV 88.2 12/18/2024    MCH 30.0 12/18/2024    MCHC 34.1 12/18/2024    RDW 16.1 12/18/2024    .0 (L) 12/18/2024     Lab Results   Component Value Date     12/18/2024    K 3.7 12/18/2024     12/18/2024    CO2 26.0 12/18/2024    BUN 13 12/18/2024    CREATSERUM 0.90 12/18/2024     (H) 12/18/2024    CA 9.7 12/18/2024    ALKPHO 71 12/18/2024    ALT 18 12/18/2024    AST 18 12/18/2024    BILT 0.5 12/18/2024    ALB 4.3 12/18/2024    TP 7.0 12/18/2024       Radiologic imaging reviewed at this visit:    MRI Pelvis on 8/20/2024:  FINDINGS:    UTERUS:  The uterus has been removed.  There is infiltrative spiculated mass with irregular enhancement with epicenter at the vaginal cuff.  This is seen on series 4, image 23 to measure 3.4 x 2.1 cm.  On postcontrast images this is noted on series 10, image 23. Spiculated infiltrated irregular enhancement extends into the left adnexa on series 4, image 17 measuring 2.9 x 1.3 cm.  There is an adjacent but separate enhancing nodule near left external iliac vessels seen series 10, image 14 which measures  1.2 x 1.2 cm.  These correspond areas of hyperactivity on the recent PET scan.  OVARIES:  A normal left ovary is not identified separate from the infiltrative mass in the left adnexa.  The right ovary measures 6 x 4 cm.  This measurement includes 2 adjacent cysts in the right ovary.  There is solid enhancing tissue noted between the  2 cystic components of the right ovary which is seen for example on coronal postcontrast images series 11, image 23. This does  demonstrate some intermediate signal on the T2 weighted images and was associated with increased activity on the recent PET scan which does raise the possibility of a metastasis or contiguous involvement of the right ovary.  CUL-DE-SAC:  There is postoperative change and irregular enhancing tissue in the rectovesical space.  LYMPH NODES:  Enhancing nodule anterior to the left external iliac vessels is most likely an enlarged lymph node and is associated with activity on the recent PET scan.  Right paramedian lymph node adjacent to common iliac vessels is noted on the T2 sequence series 4, image 11.  This corresponds to the hypermetabolic nodule noted on PET scan.  BLADDER:  No focal wall thickening or mass.  BONES:  Normal for age.  OTHER:  Negative.      Impression   CONCLUSION:    1. Infiltrative enhancing lesions at the cuff of the vagina in this patient who is had a prior hysterectomy extends into the left adnexa with measurements given above.  This is most likely residual squamous cell neoplasm.  2. Separate enhancing nodule anterior to the left external iliac vessels is most likely metastatic lymph node.  3. Multilocular cystic lesion right ovary with associated solid enhancing tissue intervening between the cysts.  This did demonstrate significant uptake on the recent PET scan and is suspicious for metastasis or direct involvement of the right ovary.  4. Hypermetabolic nodule right paramedian location adjacent to right common iliac vessels noted on recent PET scan is described above.        Assessment/Plan:     Squamous cell carcinoma of the cervix:  HPV+  Clinical T2b staging with deep invasiion of the cervical stroma and parametrial invasion. LVI+     The patient has parametrial involvement which is at least FIGO stage IIB. She had PET scan with suspicious eliel uptake in the pelvis. MRI of pelvis showed left external iliac lymphadenopathy.    She has completed combined radiation and concurrent weekly  cisplatin 40 mg/m2 for stage IIB disease. She had interstitial brachytherapy at [facility] (11/12-11/13/2024). She will now continue with maintenance pembrolizumab. She will get a 400 mg dose every six weeks now. She was comfortable with this plan. She has no immune related side effects.    She continues to follow with Dr. JESSICA Ornelas for gyne onc  follow up.     She has central venous access for the chemotherapy. Continue routine port management.    Microcytic Anemia:  Iron deficiency anemia     Improved HGB after Infed 1000 mg IVPB. Will repeat the CBC in three weeks.      Richard Teague MD

## 2024-12-18 NOTE — PROGRESS NOTES
Pt here for C1D1 Drug(s)Maintenance Keytruda.  Arrives Ambulating independently, accompanied by Self     Patient was evaluated today by MD.    Oral medications included in this regimen:  no    Patient confirms comprehension of cancer treatment schedule:  yes    Pregnancy screening:  Denies possibility of pregnancy    Modifications in dose or schedule:  No now starting her maintenance Pembrolizumab every 6 weeks    Medications appearance and physical integrity checked by RN: yes.    Chemotherapy IV pump settings verified by 2 RNs:  No due to targeted therapy IV administration.  Frequency of blood return and site check throughout administration: Prior to administration and At completion of therapy     Infusion/treatment outcome:  patient tolerated treatment without incident    Education Record    Learner:  Patient  Barriers / Limitations:  None  Method:  Brief focused and Reinforcement  Education / instructions given:  Plan of care reviewed.  Outcome:  Shows understanding    Discharged Home, Ambulating independently, accompanied by:Self    Patient/family verbalized understanding of future appointments: by [employer] messaging

## 2024-12-18 NOTE — PROGRESS NOTES
Patient here for Keytruda for carcinoma of cervix. Patient had f/u with Dr Ornelas yesterday. Patient states she has a chronic cough, no fever or chest pain. Patient has no further concerns or complaints at this time.     Education Record    Learner:  Patient    Disease / Diagnosis: squamous cell carcinoma of cervix     Barriers / Limitations:  None   Comments:    Method:  Discussion   Comments:    General Topics:  Medication and Plan of care reviewed   Comments:    Outcome:  Shows understanding   Comments:

## 2024-12-31 NOTE — ED INITIAL ASSESSMENT (HPI)
Cough starting 12/24. Diarrhea x4 days, though states that she has had issues with diarrhea since finishing radiation in October for cervical CA. On Ktruda currently.  Denies fevers.

## 2024-12-31 NOTE — DISCHARGE INSTRUCTIONS
Push fluids.  Rest.  Tylenol as needed for pain or fever.  Use the inhaler every 4-6 hours as needed for cough or wheezing.  Follow-up with your primary care provider.  If you develop any difficulty breathing or other worsening or concerning symptoms, please go to the nearest emergency department for further evaluation.   never

## 2025-01-01 NOTE — ED PROVIDER NOTES
He    Patient Seen in: [facility]      History     Chief Complaint   Patient presents with   • Cough/URI     Stated Complaint: cold/flu like symptoms  Subjective:   45 y/o female who just completed tx for cervical cancer presents for URI symptoms for the past week. She has had a productive cough and congestion. Positive sick contacts at home. She denies any fevers or chills. No sore throat or ear pain. No CP or SOB. She has had some loose stools, but states this has been occurring since her radiation tx. No vomiting. No neck stiffness. No rashes. No headaches or dizziness. She appears well and nontoxic.     Objective:   Past Medical History:   • Anal condyloma    nausea post operatively   • Asthma (HCC)    exercise induced as a child-no issues as an adult   • Cervical cancer (HCC)   • H/O pelvic ultrasound    CONCLUSION:  Stable uterine fibroids, sizable, the largest of which measures up to 7.3 cm.  Measurements given above.  Right ovarian cyst appears simple by ultrasound 5.4 cm maximum dimension.  No pelvic free fluid.   • History of urinary reflux   • PONV (postoperative nausea and vomiting)   • Problems with swallowing    can't swallow pills   • Ruptured ovarian cyst   • Visual impairment            Past Surgical History:   Procedure Laterality Date   • Hysterectomy  2024    TOTAL ABDOMINAL HYSTERECTOMY, BILATERAL SALPINGECTOMY, INTRAOPERATIVE SURGICAL CONSULT, SIGMOIDOSCOPY   •   8/3/99   • Other surgical history      bladder surgery for reflux   • Other surgical history      rupture ovarian cyst with bleeding from fallopian tube-still has both ovaries   • Other surgical history  2024    ANAL EXAM UNDER ANESTHESIA, EXCISION OF RECTAL MASS, EXCISION AND FULGURATION OF PERIANAL CONDYLOMA              Social History     Socioeconomic History   • Marital status:    • Number of children: 1   Occupational History   • Occupation: on leave of absence   Tobacco Use   • Smoking  status: Never     Passive exposure: Never   • Smokeless tobacco: Never   Vaping Use   • Vaping status: Never Used   Substance and Sexual Activity   • Alcohol use: Never   • Drug use: Never   • Sexual activity: Not Currently     Partners: Male     Birth control/protection: Hysterectomy   Other Topics Concern   •  Service No   • Blood Transfusions No   • Caffeine Concern No   • Occupational Exposure No   • Hobby Hazards No   • Sleep Concern No   • Stress Concern No   • Weight Concern No   • Special Diet No   • Back Care No   • Exercise No   • Bike Helmet No   • Seat Belt No   • Self-Exams No   Social History Narrative    , lives with     Has 1 son     Social Drivers of Health     Financial Resource Strain: Low Risk  (12/10/2024)    Received from [facility]    Overall Financial Resource Strain (CARDIA)    • Difficulty of Paying Living Expenses: Not very hard   Food Insecurity: Food Insecurity Present (12/10/2024)    Received from [facility]    Hunger Vital Sign    • Worried About Running Out of Food in the Last Year: Sometimes true    • Ran Out of Food in the Last Year: Sometimes true   Transportation Needs: No Transportation Needs (12/10/2024)    Received from [facility]    PRAPARE - Transportation    • Lack of Transportation (Medical): No    • Lack of Transportation (Non-Medical): No   Housing Stability: Unknown (12/10/2024)    Received from [facility]    Housing Stability Vital Sign    • Unable to Pay for Housing in the Last Year: No            Review of Systems    Positive for stated complaint: Cough/URI     Other systems are as noted in HPI.  Constitutional and vital signs reviewed.      All other systems reviewed and negative except as noted above.    Physical Exam     ED Triage Vitals [12/31/24 1320]   /66   Pulse 95   Resp 26   Temp 98.4 °F (36.9 °C)   Temp src Oral   SpO2 98 %   O2 Device None  (Room air)     Current:/66   Pulse 95   Temp 98.4 °F (36.9 °C) (Oral)   Resp 26   LMP 06/26/2024 (Exact Date)   SpO2 98%     Physical Exam  Vitals and nursing note reviewed.   Constitutional:       General: She is not in acute distress.     Appearance: Normal appearance. She is not toxic-appearing.   HENT:      Head: Normocephalic.      Right Ear: Tympanic membrane normal.      Left Ear: Tympanic membrane normal.      Nose: Congestion present. No rhinorrhea.      Mouth/Throat:      Mouth: Mucous membranes are moist.      Pharynx: Oropharynx is clear. No oropharyngeal exudate or posterior oropharyngeal erythema.   Eyes:      Extraocular Movements: Extraocular movements intact.      Conjunctiva/sclera: Conjunctivae normal.      Pupils: Pupils are equal, round, and reactive to light.   Cardiovascular:      Rate and Rhythm: Normal rate and regular rhythm.   Pulmonary:      Effort: No respiratory distress.      Breath sounds: No wheezing, rhonchi or rales.   Chest:      Chest wall: No tenderness.   Musculoskeletal:         General: Normal range of motion.   Skin:     General: Skin is warm and dry.      Capillary Refill: Capillary refill takes less than 2 seconds.      Findings: No rash.   Neurological:      General: No focal deficit present.      Mental Status: She is alert and oriented to person, place, and time.   Psychiatric:         Mood and Affect: Mood normal.         Behavior: Behavior normal.       ED Course   XR CHEST PA + LAT CHEST (CPT=71046)    Result Date: 12/31/2024  CONCLUSION:  Moderate bronchial wall thickening, suspect bronchitis.   LOCATION:  [facility]   Dictated by (CST): Edwin Ferguson MD on 12/31/2024 at 1:59 PM     Finalized by (CST): Edwin Ferguson MD on 12/31/2024 at 1:59 PM      Labs Reviewed   RAPID SARS-COV-2 BY PCR - Normal   POCT FLU TEST - Normal    Narrative:     This assay is a rapid molecular in vitro test utilizing nucleic acid amplification of influenza A and B viral RNA.        MDM     Medical Decision Making  The CXR, Covid test, and influenza test are all negative. The patient is aware. Her symptoms are most likely viral. We discussed supportive care including OTC meds like Tylenol for pain or fever and Mucinex for cough. I will prescribe an Albuterol Inhaler to help with her cough. She will continue pushing fluids, rest, and follow up closely with her PCP if her symptoms persist or worsen.    Amount and/or Complexity of Data Reviewed  Independent Historian: spouse  Labs: ordered.     Details: Influenza and Covid Negative.  Radiology: ordered.     Details: I visualized the CXR images which are negative for any pneumonia or other acute cardiopulmonary process.    Risk  OTC drugs.  Risk Details: Covid vs Influenza vs Pneumonia vs URI        Disposition and Plan     Clinical Impression:  1. Upper respiratory virus         Disposition:  Discharge  12/31/2024  2:19 pm    Follow-up:  Christina Loredo DO  [address] [address]  [phone]    Schedule an appointment as soon as possible for a visit in 3 days            Medications Prescribed:  Discharge Medication List as of 12/31/2024  2:20 PM        START taking these medications    Details   albuterol 108 (90 Base) MCG/ACT Inhalation Aero Soln Inhale 2 puffs into the lungs every 4 (four) hours as needed for Wheezing., Normal, Disp-1 each, R-0

## 2025-01-06 NOTE — PATIENT INSTRUCTIONS
- WE WILL CALL TO SCHEDULE YOUR RADIATION TREATMENT WHEN YOU PLAN IS COMPLETE AND HAS BEEN APPROVED BY INSURANCE.        - IF YOU HAVE ANY QUESTIONS OR CONCERNS REGARDING RADIATION THERAPY, PLEASE CALL [phone].

## 2025-01-06 NOTE — PROGRESS NOTES
Nursing Re- Consult Note    Patient: Rubia Ladd  YOB: 1980  Age: 44 year old  Radiation Oncologist:Kulwinder Ortiz MD  Referring Physician: Kulwinder Ortiz  Diagnosis:No diagnosis found.  Consult Date: 1/6/2025    History of Present Illness: Hx of cervical cancer. Completed chemoRT with weekly cisplatin 10/28/24. S/p interstitial brachytherapy at [facility] 11/12/24-11/13/24. Continues on pembrolizumab. Here for recon today to discuss treatment of pelvic LN. Pt is recovering from \"viral\" URI. Has some lower abdominal pain pt relates to coughing. Continues to have cough. Has had decreased appetite and diarrhea. Is starting to improve in the past couple days. Continues to have soft stools. Has very small amount of stool incontinence every day ever since RT. Had some dysuria post radiation, has resolved on it's own.       Vital Signs: BP (!) 137/92 (BP Location: Left arm, Patient Position: Sitting, Cuff Size: large)   Pulse 95   Temp 98.6 °F (37 °C) (Tympanic)   Resp 20   Wt 85.9 kg (189 lb 6.4 oz)   LMP 06/26/2024 (Exact Date)   SpO2 98%   BMI 34.41 kg/m²     Wt Readings from Last 6 Encounters:   01/06/25 85.9 kg (189 lb 6.4 oz)   12/18/24 89.6 kg (197 lb 9.6 oz)   11/26/24 90.1 kg (198 lb 9.6 oz)   10/29/24 88.8 kg (195 lb 12.8 oz)   10/28/24 89.5 kg (197 lb 6.4 oz)   10/22/24 89.2 kg (196 lb 9.6 oz)       ROS: Review of Systems   Constitutional:  Positive for malaise/fatigue.   HENT:  Positive for congestion.    Eyes: Negative.    Respiratory:  Positive for cough.    Cardiovascular: Negative.    Gastrointestinal:  Positive for abdominal pain and heartburn.   Genitourinary: Negative.    Neurological: Negative.    Endo/Heme/Allergies: Negative.    Psychiatric/Behavioral: Negative.         Medications and Allergies review by RN: yes

## 2025-01-24 NOTE — PROGRESS NOTES
[facility] Radiation Treatment Management Note 1-5    Patient:  Rubia Ladd  Age:  44 year old  Visit Diagnosis:    1. Squamous cell carcinoma of cervix (HCC) [C53.9]      Primary Rad/Onc:  Dr. Kulwinder Ortiz    Site Delivered Dose (cGy) Prescribed Dose (cGy) Fraction #   PA  900 4/5     First treatment date:  1/22/25  Concurrent chemotherapy: N/A        12/31/2024     1:20 PM 1/6/2025    12:54 PM 1/27/2025     8:50 AM   Oncology Vitals   Weight  189 lb 6.4 oz 192 lb   Weight  85.911 kg 87.091 kg   BSA (m2)  1.87 m2 1.88 m2   BMI  34.41 kg/m2 34.89 kg/m2   /66 137/92 146/83   Pulse 95 95 89   Resp 26 20 18   Temp 98.4 °F (36.9 °C) 98.6 °F (37 °C) 98.1 °F (36.7 °C)   SpO2 98 % 98 % 98 %   Pain Score  0 0        Toxicities:  Fatigue Grade 0= None  Constipation Grade 0= None  Diarrhea  Grade 0= None  Flatulence Grade 0= None  Vaginal bleeding Grade 0= None  Urinary frequency Grade 0= None  Vaginal dryness Grade 0= None  Dysuria on urination Grade 0= None  Urgency on urination Grade 0= None    Nursing Note:  No change in bowels  No urinary symptoms  No vaginal discharge  Will finish RT 1/28/25  Has PET scheduled for 1/31/25      Alyssa DUNLAP RN    Physician Note:  Subjective:  Doing well, no issues or c/o.  Tolearting without difficulty.      Objective:  Unchanged      Treatment setup imaging have been reviewed:  Yes    Assessment/Plan:    Continue radiotherapy per plan    Completes tomorrow    Next visit:  f/u 3 months, to get PET next month and see Livan/RK    Dr. Kulwinder Ortiz

## 2025-01-27 NOTE — PATIENT INSTRUCTIONS
- WE WILL CALL TO SCHEDULE YOUR FOLLOW-UP APPOINTMENT WITH DR. VALERIA MORALES IN 3 MONTHS      - CALL [phone] IF YOU HAVE ANY QUESTIONS/CONCERNS REGARDING RADIATION THERAPY

## 2025-02-05 NOTE — PROGRESS NOTES
Pt here for C11D1 Drug(s) Keytruda.  Arrives Ambulating independently, accompanied by Self     Patient was evaluated today by MD and Treatment Nurse.    Oral medications included in this regimen:  no    Patient confirms comprehension of cancer treatment schedule:  yes    Pregnancy screening:  Denies possibility of pregnancy    Modifications in dose or schedule:  No    Medications appearance and physical integrity checked by RN: yes.    Chemotherapy IV pump settings verified by 2 RNs:  No due to targeted therapy IV administration.  Frequency of blood return and site check throughout administration: Prior to administration     Infusion/treatment outcome:  patient tolerated treatment without incident    Education Record    Learner:  Patient  Barriers / Limitations:  None  Method:  Brief focused and Reinforcement  Education / instructions given:  POC  Outcome:  Shows understanding    Discharged Home, Ambulating independently, accompanied by:Self    Patient/family verbalized understanding of future appointments: by [employer] messaging    Patient declined using port today, requesting PIV. She stated that it gets really tender after it's accessed and would rather avoid accessing port today. Patient informed that since her port has not been accessed since December 2025, it will need to be accessed and flushed at her next appt 3/19/25. Patient verbalized understanding.

## 2025-02-05 NOTE — PROGRESS NOTES
Cancer Center Progress Note    Problem List:      Patient Active Problem List   Diagnosis    History of ovarian cyst    External hemorrhoid    Class 1 obesity due to excess calories without serious comorbidity with body mass index (BMI) of 32.0 to 32.9 in adult    Prediabetes    Elevated LDL cholesterol level    Iron deficiency anemia secondary to blood loss (chronic)    Fibroid, uterine    Uterine bleeding    Iron deficiency anemia due to chronic blood loss    Squamous cell carcinoma of cervix (HCC)       Interim History:    Rubia Ladd presents today for evaluation and management of a diagnosis of cervical cancer.    She has been doing well. She had a transient URI in 12/2024. This has resolved. She has no new complaints now.  She is here to continue the pembrolizumab.    The patient has completed radiation and concurrent weekly doses of cisplatin. She has been getting pembrolizumab 400 mg every six weeks.     She had interstitial brachytherapy at [place] from 11/12/ to 11/13/2024.    She had a PET scan on 1/31/2025 that show a single area of increased SUV in a paratracheal node measuring 1 cm.    Oncology history: In 1/2024 she decided to have medical evaluation when she had new insurance coverage. She had complained of hemorrhoid bleeding. She was evaluated by Dr. Lars Olvera. She had excision of rectal mass on 5/24/2024 that showed mucosal prolapse/polyps. There was no evidence of malignancy. She had post op vaginal bleeding. She had evaluation by Dr. Zee on 6/3/2024. US of pelvis showed uterine fibroid. She proceeded to have CHANTEL and bilateral salpingectomy on 7/5/2024. 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 Tumor invaded the deep on third of the cervical stroma and involved the right parametrial tissue. There was focal LVI. The fallopian tubes were negative for carcinoma and margines were negative. She had surgical oncology evaluation by  Dr. Gibran Headley and radiation oncology consultation by Dr. Kulwinder Ortiz.     She had PET scan on 8/14/2024  that showed SUV 9.4 at the left side of the vaginal stump. There was SUV 5.3 at the right paramidline lymph node in the pelvis just below the bifurcation of the aorta and vena cava. The bilateral adnexa had increased activity SUV 5.3 and 6.9.    Review of Systems:   Constitutional: Negative for anorexia, fevers, chills, night sweats and weight loss.  Eyes: Negative for visual disturbance, irritation and redness.  Respiratory: Negative for cough, hemoptysis, chest pain  Cardiovascular: Negative for angina, orthopnea or palpitations.  Gastrointestinal: Negative for vomiting, change in bowel habits, diarrhea, constipation  Integument/breast: Negative for rash, skin lesions, and pruritus.  Hematologic/lymphatic: Negative for easy bruising, bleeding, and lymphadenopathy.  Musculoskeletal: Negative for myalgias, arthralgias, muscle weakness.  Neurological: Negative for headaches, dizziness, speech problems, gait problems and focal weakness.  Psychiatric: The patient's mood was calm and appropriate for this visit.  The pertinent positives and negatives were described. All other systems were negative.    PMH/PSH:  Past Medical History:    Anal condyloma    nausea post operatively    Asthma (HCC)    exercise induced as a child-no issues as an adult    Cervical cancer (HCC)    H/O pelvic ultrasound    CONCLUSION:  Stable uterine fibroids, sizable, the largest of which measures up to 7.3 cm.  Measurements given above.  Right ovarian cyst appears simple by ultrasound 5.4 cm maximum dimension.  No pelvic free fluid.    History of urinary reflux    PONV (postoperative nausea and vomiting)    Problems with swallowing    can't swallow pills    Ruptured ovarian cyst    Visual impairment       Past Surgical History:   Procedure Laterality Date    Hysterectomy  07/05/2024    TOTAL ABDOMINAL HYSTERECTOMY, BILATERAL  SALPINGECTOMY, INTRAOPERATIVE SURGICAL CONSULT, SIGMOIDOSCOPY      8/3/99    Other surgical history      bladder surgery for reflux    Other surgical history      rupture ovarian cyst with bleeding from fallopian tube-still has both ovaries    Other surgical history  2024    ANAL EXAM UNDER ANESTHESIA, EXCISION OF RECTAL MASS, EXCISION AND FULGURATION OF PERIANAL CONDYLOMA       Family History Reviewed:  Family History   Problem Relation Age of Onset    No Known Problems Father     Other (ovarian cysts) Mother     Alcohol abuse Mother     No Known Problems Daughter     Hypertension Maternal Grandmother     No Known Problems Maternal Grandfather     No Known Problems Paternal Grandmother     No Known Problems Paternal Grandfather     Uterine Cancer Sister         dx age 30s    Pancreatic Cancer Neg     Colon Cancer Neg     Prostate Cancer Neg     Ovarian Cancer Neg     Breast Cancer Neg     Cancer Neg     Endometriosis Neg     Infertility Neg        Allergies:     Allergies   Allergen Reactions    Morphine NAUSEA AND VOMITING       Medications:   Pembrolizumab (KEYTRUDA IV) Inject into the vein.           Vital Signs:      Height: 158 cm (5' 2.21\") (859)  Weight: 86.2 kg (190 lb) (859)  BSA (Calculated - sq m): 1.87 sq meters (859)  Pulse: 95 (859)  BP: 155/87 (859)  Temp: 97.3 °F (36.3 °C) (859)  Do Not Use - Resp Rate: --  SpO2: 99 % (859)      Performance Status:  ECOG 0: Fully active, able to carry on all pre-disease performance without restriction     Physical Examination:    Constitutional: Patient is alert and not in acute distress.  Respiratory: Clear to auscultation and percussion. No rales.  No wheezes.  Cardiovascular: Regular rate and rhythm. No murmurs.  Gastrointestinal: Soft, non tender with good bowel sounds.  Musculoskeletal: No edema. No calf tenderness.  Lymphatics: There is no palpable lymphadenopathy throughout in the cervical,  supraclavicular, or axillary regions.  Psychiatric: The patient's mood is calm and appropriate for this visit.      Labs reviewed at this visit:     Lab Results   Component Value Date    WBC 3.9 (L) 02/05/2025    RBC 4.48 02/05/2025    HGB 13.3 02/05/2025    HCT 38.5 02/05/2025    MCV 85.9 02/05/2025    MCH 29.7 02/05/2025    MCHC 34.5 02/05/2025    RDW 13.2 02/05/2025    .0 02/05/2025     Lab Results   Component Value Date     12/18/2024    K 3.7 12/18/2024     12/18/2024    CO2 26.0 12/18/2024    BUN 13 12/18/2024    CREATSERUM 0.90 12/18/2024     (H) 12/18/2024    CA 9.7 12/18/2024    ALKPHO 71 12/18/2024    ALT 18 12/18/2024    AST 18 12/18/2024    BILT 0.5 12/18/2024    ALB 4.3 12/18/2024    TP 7.0 12/18/2024       Radiologic imaging reviewed at this visit:    PET scan on 1/31/2025:  FINDINGS:    ABNORMAL FOCI:  Small 1 cm high right paratracheal node demonstrates a maximum SUV of 4.  Uptake is present within the distal esophagus suggestive of gastroesophageal reflux.  OTHER:   Right Port-A-Cath.  Diffuse osseous uptake may be related to treatment changes.  Physiologic uptake is noted within the bowel and collecting system.  Hysterectomy.  Small atrophic right kidney with compensatory hypertrophy of the left  kidney is again noted.  Previously described 10 versus 6 mm pelvic retroperitoneal node, image 338 of series 3 does not demonstrate uptake.     Impression   CONCLUSION:    1. No abnormal uptake is noted within the surgical bed.  2. There is a 1 cm right paratracheal node with increased uptake which may be reactive, correlate clinically.       MRI Pelvis on 8/20/2024:  FINDINGS:    UTERUS:  The uterus has been removed.  There is infiltrative spiculated mass with irregular enhancement with epicenter at the vaginal cuff.  This is seen on series 4, image 23 to measure 3.4 x 2.1 cm.  On postcontrast images this is noted on series 10, image 23. Spiculated infiltrated irregular  enhancement extends into the left adnexa on series 4, image 17 measuring 2.9 x 1.3 cm.  There is an adjacent but separate enhancing nodule near left external iliac vessels seen series 10, image 14 which measures  1.2 x 1.2 cm.  These correspond areas of hyperactivity on the recent PET scan.  OVARIES:  A normal left ovary is not identified separate from the infiltrative mass in the left adnexa.  The right ovary measures 6 x 4 cm.  This measurement includes 2 adjacent cysts in the right ovary.  There is solid enhancing tissue noted between the  2 cystic components of the right ovary which is seen for example on coronal postcontrast images series 11, image 23. This does demonstrate some intermediate signal on the T2 weighted images and was associated with increased activity on the recent PET scan which does raise the possibility of a metastasis or contiguous involvement of the right ovary.  CUL-DE-SAC:  There is postoperative change and irregular enhancing tissue in the rectovesical space.  LYMPH NODES:  Enhancing nodule anterior to the left external iliac vessels is most likely an enlarged lymph node and is associated with activity on the recent PET scan.  Right paramedian lymph node adjacent to common iliac vessels is noted on the T2 sequence series 4, image 11.  This corresponds to the hypermetabolic nodule noted on PET scan.  BLADDER:  No focal wall thickening or mass.  BONES:  Normal for age.  OTHER:  Negative.      Impression   CONCLUSION:    1. Infiltrative enhancing lesions at the cuff of the vagina in this patient who is had a prior hysterectomy extends into the left adnexa with measurements given above.  This is most likely residual squamous cell neoplasm.  2. Separate enhancing nodule anterior to the left external iliac vessels is most likely metastatic lymph node.  3. Multilocular cystic lesion right ovary with associated solid enhancing tissue intervening between the cysts.  This did demonstrate  significant uptake on the recent PET scan and is suspicious for metastasis or direct involvement of the right ovary.  4. Hypermetabolic nodule right paramedian location adjacent to right common iliac vessels noted on recent PET scan is described above.        Assessment/Plan:     Squamous cell carcinoma of the cervix:  HPV+  Clinical T2b staging with deep invasiion of the cervical stroma and parametrial invasion. LVI+     The patient has parametrial involvement which is at least FIGO stage IIB. She had PET scan with suspicious eliel uptake in the pelvis. MRI of pelvis showed left external iliac lymphadenopathy.    She has completed combined radiation and concurrent weekly cisplatin 40 mg/m2 for stage IIB disease. She had interstitial brachytherapy at [place] (11/12-11/13/2024). She is now getting maintenance pembrolizumab 400 mg IV every 6 weeks.    She had repeat PET scan that is negative in the abdomen and pelvis. There is a normal sized paratracheal node with SUV 4. She did have recent URI but malignancy is possible. I favor a short interval follow up CT of the chest. I will see her in six weeks with plans to get a repeat CT of the chest in 12 weeks.    She has no immune related side effects.    She continues to follow with Dr. JESSICA Ornelas for gyne onc with plans for follow up on 2/25/2025.     She has central venous access for the chemotherapy. Continue routine port management.    Microcytic Anemia:  Iron deficiency anemia     Her hemoglobin is now normal. Follow cbc at six week intervals for now.    Paratracheal node 1 cm with SUV 4:    She had recent URI. Possible reactive node. I will see her in six weeks with plans for CT of the chest in 12 weeks unless she has new symptoms.      Richard Teague MD

## 2025-02-05 NOTE — PROGRESS NOTES
Patient here for Keytruda infusion and f/u. Patient finished RT on 1/28. Patient completed PET scan with Dr Risa Ortiz on 1/31. Patient states she has a cold with a lingering cough. Patient denies fever, chills or fever. Patient denies pain. Patient has no further concerns or complaints at this time.     Education Record    Learner:  Patient    Disease / Diagnosis: carcinoma of cervix     Barriers / Limitations:  None   Comments:    Method:  Discussion   Comments:    General Topics:  Medication, Pain, Side effects and symptom management, and Plan of care reviewed   Comments:    Outcome:  Shows understanding   Comments:

## 2025-02-25 NOTE — TELEPHONE ENCOUNTER
Pt was just seen by her Dr at [facility] [facility] and was informed she can now have the port removed. She would like to schedule the removal of the port with Dr. Teague    Please contact patient.

## 2025-02-26 NOTE — PAT NURSING NOTE
PreOp Instructions: HAVE BLOOD WORK DRAWN     You are scheduled for: an Interventional Radiology Procedure     Date of Procedure: 03/11/25. CHECK IN AT 8:30 AM     Diet Instructions: Do not eat or drink anything after midnight including gum, mints, candy, etc.     Skin Prep : Shower with antibacterial soap using a clean washcloth, prior to procedure. Once dried off, no lotions/powders/creams/ointments, etc.     Driving After Procedure: Sedation will be given so you WILL NOT be able to drive home. You will need a responsible adult  to drive you home. You can NOT take uber or taxi unless approved by your physician in advance.     Discharge Teaching: Your nurse will give you specific instructions before discharge, Most people can resume normal activities in 2-3 days, Any questions, please call the physician's office

## 2025-03-11 NOTE — PROGRESS NOTES
Pt received s/p PAC removal with Dr. Otero. Right upper chest dressing CDI, non bleeding or swelling present. Recovery complete. Discharge instructions given to pt and pt's . IV discontinued, pt taken down to [facility] via wheelchair for discharge.

## 2025-03-11 NOTE — DISCHARGE INSTRUCTIONS
PORT REMOVAL DISCHARGE INSTRUCTIONS    ~You will go home with a dressing over the incision site. This needs to remain in place for 3 days    ~You may sponge bathe or use a hand held sprayer to shower until the 3rd day    ~On day 3, wash your hands with soap and water for 20 seconds prior to removing the dressing    ~On day 3, you may shower after removing the dressing. Leave the steri-strips in place, if present. They  will fall off on their own. If they are still in place after 10 days, you may remove them    ~Signs of infection: if you experience redness at the puncture site, increased pain, fever, or drainage, then call your doctor's office for an appointment    ~You may have slight bruising at the site.     ~It is normal to have a small amount of drainage on the bandage and to have soreness to the site. Take Tylenol for discomfort (if you are not allergic)    ~If you have any questions, problems, or concerns, please contact your doctor's office    Thank you!

## 2025-03-12 NOTE — PROCEDURES
[facility]   part of [facility]  Procedure Note    Rubia Ladd Patient Status:  Outpatient    3/29/1980 MRN [id number]   Location [facility] INTERVENTIONAL SUITES Attending No att. providers found   Hosp Day # 0 PCP Christina Loredo DO     Procedure: Port catheter removal    Pre-Procedure Diagnosis:  SSC    Post-Procedure Diagnosis: Same    Anesthesia:  Local and Sedation    Findings:    Successful removal of right chest port catheter   Incision closed with absorbable suture and dermabond    Specimens: None    Blood Loss:  <5ml    Tourniquet Time: 0  Complications:  None  Drains:  None    Secondary Diagnosis:  None      Heron Otero MD  3/12/2025

## 2025-03-12 NOTE — H&P
[facility]   part of [facility]   History & Physical    Rubia Ladd Patient Status:  Outpatient    3/29/1980 MRN [id number]   Location [facility] INTERVENTIONAL SUITES Attending No att. providers found   Hosp Day # 0 PCP Christina Loredo DO     Admitting Diagnosis:   Port removal    History of Present Illness:   Here for port removal, no complaints.    History   Past Medical History:  Past Medical History:    Anal condyloma    nausea post operatively    Asthma (HCC)    exercise induced as a child-no issues as an adult    Cervical cancer (HCC)    H/O pelvic ultrasound    CONCLUSION:  Stable uterine fibroids, sizable, the largest of which measures up to 7.3 cm.  Measurements given above.  Right ovarian cyst appears simple by ultrasound 5.4 cm maximum dimension.  No pelvic free fluid.    History of urinary reflux    PONV (postoperative nausea and vomiting)    Problems with swallowing    can't swallow pills    Ruptured ovarian cyst    Visual impairment       Past Surgical History:  Past Surgical History:   Procedure Laterality Date    Hysterectomy  2024    TOTAL ABDOMINAL HYSTERECTOMY, BILATERAL SALPINGECTOMY, INTRAOPERATIVE SURGICAL CONSULT, SIGMOIDOSCOPY      8/3/99    Other surgical history      bladder surgery for reflux    Other surgical history      rupture ovarian cyst with bleeding from fallopian tube-still has both ovaries    Other surgical history  2024    ANAL EXAM UNDER ANESTHESIA, EXCISION OF RECTAL MASS, EXCISION AND FULGURATION OF PERIANAL CONDYLOMA       Social History:  Social History     Tobacco Use    Smoking status: Never     Passive exposure: Never    Smokeless tobacco: Never   Substance Use Topics    Alcohol use: Never        Family History:  Family History   Problem Relation Age of Onset    No Known Problems Father     Other (ovarian cysts) Mother     Alcohol abuse Mother     No Known Problems Daughter     Hypertension Maternal Grandmother     No Known  Problems Maternal Grandfather     No Known Problems Paternal Grandmother     No Known Problems Paternal Grandfather     Uterine Cancer Sister         dx age 30s    Pancreatic Cancer Neg     Colon Cancer Neg     Prostate Cancer Neg     Ovarian Cancer Neg     Breast Cancer Neg     Cancer Neg     Endometriosis Neg     Infertility Neg        Allergies/Medications:   Allergies:  Allergies[1]    Medications:  No current outpatient medications on file.    Physical Exam & Review of Systems:   Physical Exam:    BP (!) 144/94   Pulse 83   Temp 97.9 °F (36.6 °C) (Temporal)   Resp 18   Wt 195 lb   LMP 06/26/2024 (Exact Date)   SpO2 94%   BMI 35.43 kg/m²     General: NAD  Neck: No JVD  Lungs: CTA bilat  Heart: RRR, S1, S2  Abdomen: Soft, NT/ND, BS+x4  Extremities: Warm, dry, no LE edema bilat  Pulses: 2+ bilat DP    Results:   Labs:  Recent Labs   Lab 03/07/25  0803   RBC 4.89   HGB 13.8   HCT 40.9   MCV 83.6   MCH 28.2   MCHC 33.7   RDW 13.6   NEPRELIM 3.35   WBC 5.4   .0     Recent Labs   Lab 03/11/25  0850   INR 1.0     No results for input(s): \"GLU\", \"BUN\", \"CREATSERUM\", \"GFRAA\", \"GFRNAA\", \"CA\", \"NA\", \"K\", \"CL\", \"CO2\" in the last 168 hours.    Assessment/Plan:   Impression: Right chest port no longer needed    Recommendations: Proceed with right chest port removal    Heron Otero MD  3/12/2025  9:14 AM           [1]   Allergies  Allergen Reactions    Morphine NAUSEA AND VOMITING

## 2025-03-19 NOTE — PROGRESS NOTES
Patient here for Keytruda 400 mg infusion. Patient states she has an ongoing sporadic non-productive cough. Patient c/o new hip and sternum pain. Patient states she's been more active lately. Patient has no further concerns or complaints.     Education Record    Learner:  Patient    Disease / Diagnosis: squamous cell carcinoma of cervix    Barriers / Limitations:  None   Comments:    Method:  Discussion   Comments:    General Topics:  Medication, Pain, Side effects and symptom management, and Plan of care reviewed   Comments:    Outcome:  Shows understanding   Comments:

## 2025-03-19 NOTE — PROGRESS NOTES
Cancer Center Progress Note    Problem List:      Patient Active Problem List   Diagnosis    History of ovarian cyst    External hemorrhoid    Class 1 obesity due to excess calories without serious comorbidity with body mass index (BMI) of 32.0 to 32.9 in adult    Prediabetes    Elevated LDL cholesterol level    Iron deficiency anemia secondary to blood loss (chronic)    Fibroid, uterine    Uterine bleeding    Iron deficiency anemia due to chronic blood loss    Squamous cell carcinoma of cervix (HCC)         History of Present Illness  Rubia Ladd is a 44 year old female with squamous cell carcinoma of the cervix who presents for follow-up after treatment.    She has a history of squamous cell carcinoma of the cervix and completed a course of radiation with concurrent therapy, including interstitial brachytherapy at [place] from November 12 to November 13, 2024. She is currently on maintenance pembrolizumab 400 mg every six weeks. Her last PET scan on January 31, 2025, showed no abnormal uptake within the surgical bed, and a 1 cm right paratracheal node with increased uptake, likely reactive. She had her port removed last week, which previously caused increasing pain.    She experiences chest pain, particularly in the mid-lower sternal area, after the port removal, which she attributes to increased physical activity, such as moving items in her garage. The pain is associated with movement and not present during rest.    She frequently experiences xerostomia, particularly in the morning and at night, which improves with water intake. No pain, sores, fevers, or sweats are present. Her recent blood work, including CBC, kidney function, electrolytes, liver enzymes, and protein levels, are all normal.    She mentions a history of internal bleeding surgery approximately 20 years ago, during which she had difficulty ingesting barium for a CT scan, leading to vomiting. She is concerned about differentiating symptoms  related to her weight, cancer treatment, or menopause, noting that she is not working and is less active, which may contribute to aches and pains.      Review of Systems:   Constitutional: Negative for anorexia, fatigue, fevers, chills, night sweats and weight loss.  Eyes: Negative for visual disturbance, irritation and redness.  Respiratory: Negative for cough, hemoptysis, chest pain, or dyspnea.  Cardiovascular: Negative for angina, orthopnea or palpitations.  Gastrointestinal: Negative for nausea, vomiting, change in bowel habits, diarrhea, constipation and abdominal pain.  Integument/breast: Negative for rash, skin lesions, and pruritus.  Hematologic/lymphatic: Negative for easy bruising, bleeding, and lymphadenopathy.  Genitourinary: Negative for dysuria or hematuria  Neurological: Negative for headaches, dizziness, speech problems, gait problems and focal weakness.  Psychiatric: The patient's mood was calm and appropriate for this visit.  The pertinent positives and negatives were described. All other systems were negative.    PMH/PSH:  Past Medical History:    Anal condyloma    nausea post operatively    Asthma (HCC)    exercise induced as a child-no issues as an adult    Cervical cancer (HCC)    H/O pelvic ultrasound    CONCLUSION:  Stable uterine fibroids, sizable, the largest of which measures up to 7.3 cm.  Measurements given above.  Right ovarian cyst appears simple by ultrasound 5.4 cm maximum dimension.  No pelvic free fluid.    History of urinary reflux    PONV (postoperative nausea and vomiting)    Problems with swallowing    can't swallow pills    Ruptured ovarian cyst    Visual impairment       Past Surgical History:   Procedure Laterality Date    Hysterectomy  2024    TOTAL ABDOMINAL HYSTERECTOMY, BILATERAL SALPINGECTOMY, INTRAOPERATIVE SURGICAL CONSULT, SIGMOIDOSCOPY      8/3/99    Other surgical history      bladder surgery for reflux    Other surgical history      rupture ovarian cyst  with bleeding from fallopian tube-still has both ovaries    Other surgical history  05/24/2024    ANAL EXAM UNDER ANESTHESIA, EXCISION OF RECTAL MASS, EXCISION AND FULGURATION OF PERIANAL CONDYLOMA       Family History Reviewed:  Family History   Problem Relation Age of Onset    No Known Problems Father     Other (ovarian cysts) Mother     Alcohol abuse Mother     No Known Problems Daughter     Hypertension Maternal Grandmother     No Known Problems Maternal Grandfather     No Known Problems Paternal Grandmother     No Known Problems Paternal Grandfather     Uterine Cancer Sister         dx age 30s    Pancreatic Cancer Neg     Colon Cancer Neg     Prostate Cancer Neg     Ovarian Cancer Neg     Breast Cancer Neg     Cancer Neg     Endometriosis Neg     Infertility Neg        Allergies:     Allergies[1]    Medications:  No outpatient medications have been marked as taking for the 3/19/25 encounter (Office Visit) with Richard Teague MD.         Vital Signs:      Height: 158 cm (5' 2.21\") (03/19 0823)  Weight: 86.2 kg (190 lb) (03/19 0823)  BSA (Calculated - sq m): 1.87 sq meters (03/19 0823)  Pulse: 83 (03/19 0823)  BP: 169/108 (03/19 0823)  Temp: 97.3 °F (36.3 °C) (03/19 0823)  Do Not Use - Resp Rate: --  SpO2: 97 % (03/19 0823)      Performance Status:  ECOG 0: Fully active, able to carry on all pre-disease performance without restriction       Physical Examination:      Constitutional: Patient is alert and oriented x 3, not in acute distress.  Eyes: Anicteric sclera, pink conjunctiva.  HEENT:  Oropharynx is clear. Neck is supple.  Respiratory: Clear to auscultation and percussion. No rales.  No wheezes.  Cardiovascular: Regular rate and rhythm. No murmurs.  Gastrointestinal: Soft, non tender with good bowel sounds.  Musculoskeletal: No edema. No calf tenderness.  Neurological: Grossly intact without focal motor or sensory deficit.  Skin: No suspicious skin lesion, no rash, no ulceration.  Lymphatics: There is no  palpable lymphadenopathy throughout in the cervical, supraclavicular, or axillary regions.  Psychiatric: The patient's mood is calm and appropriate for this visit.       Results reviewed at this visit:     Lab Results   Component Value Date    WBC 4.2 03/19/2025    RBC 4.79 03/19/2025    HGB 13.8 03/19/2025    HCT 39.3 03/19/2025    MCV 82.0 03/19/2025    MCH 28.8 03/19/2025    MCHC 35.1 03/19/2025    RDW 13.3 03/19/2025    .0 03/19/2025     Lab Results   Component Value Date     03/19/2025    K 3.7 03/19/2025     03/19/2025    CO2 29.0 03/19/2025    BUN 11 03/19/2025    CREATSERUM 0.90 03/19/2025    GLU 93 03/19/2025    CA 9.9 03/19/2025    ALKPHO 76 03/19/2025    ALT 19 03/19/2025    AST 17 03/19/2025    BILT 0.5 03/19/2025    ALB 4.5 03/19/2025    TP 7.2 03/19/2025       Results  LABS  CBC: WBC 4.2, Hb 13.8,  (03/19/2025)  Thyroid levels: Normal (03/19/2025)  Cortisol: Normal (03/19/2025)    RADIOLOGY  PET scan: No abnormal uptake within the surgical bed, 1 cm right paratracheal node with increased uptake, likely reactive (01/31/2025)         Assessment & Plan  Squamous cell carcinoma of the cervix  HPV+  Clinical T2b staging with deep invasiion of the cervical stroma and parametrial invasion. LVI+    The patient has parametrial involvement which is at least FIGO stage IIB. She had PET scan with suspicious eliel uptake in the pelvis. MRI of pelvis showed left external iliac lymphadenopathy.     Completed radiation therapy with concurrent chemotherapy and interstitial brachytherapy. Currently on maintenance pembrolizumab 400 mg every six weeks. Last PET scan on January 31, 2025, showed no abnormal uptake within the surgical bed and a 1 cm right paratracheal node with increased uptake, likely reactive. Reports increased energy, no rash or immune therapy toxicity. CBC, kidney function, electrolytes, liver enzymes, and protein levels are normal. Discussed recurrence risk being higher within  the first two years. Decision to remove port due to discomfort, with understanding it can be reinserted if necessary.  - Continue maintenance pembrolizumab 400 mg every six weeks.  - Monitor with labs in six weeks.  - Consider PET scan in six months or interim CT of the chest if needed.    Chest pain  Reports chest pain in the mid-lower sternal area, likely due to physical activity after port removal. Pain occurs when turning while lying down, suggesting musculoskeletal origin.  - Monitor symptoms with increased activity.    Xerostomia  Experiences dry mouth, particularly in the morning and at night. Not associated with pain or sores. Not likely a side effect of pembrolizumab, as it usually causes mucositis with pain, redness, and ulceration.  - Advise hydration throughout the day.           The following individual(s) verbally consented to be recorded using ambient AI listening technology and understand that they can each withdraw their consent to this listening technology at any point by asking the clinician to turn off or pause the recording:    Patient name: Rubia Teague MD          [1]   Allergies  Allergen Reactions    Morphine NAUSEA AND VOMITING

## 2025-03-19 NOTE — PROGRESS NOTES
Pt here for C Maintanace D1 Drug(s)Keytruda.  Arrives Ambulating independently, accompanied by Self     Patient was evaluated today by MD and Treatment Nurse.    Oral medications included in this regimen:  no    Patient confirms comprehension of cancer treatment schedule:  yes    Pregnancy screening:  Denies possibility of pregnancy    Modifications in dose or schedule:  No    Medications appearance and physical integrity checked by RN: yes.    Chemotherapy IV pump settings verified by 2 RNs:  No due to targeted therapy IV administration.  Frequency of blood return and site check throughout administration: Prior to administration     Infusion/treatment outcome:  patient tolerated treatment without incident    Education Record    Learner:  Patient  Barriers / Limitations:  None  Method:  Brief focused  Education / instructions given:  patient  Outcome:  Shows understanding    Discharged Home, Ambulating independently, accompanied by:Self    Patient/family verbalized understanding of future appointments: by printed AVS    Tolerated well her Keytruda treatment. Patient had her port removed

## 2025-04-29 NOTE — PROGRESS NOTES
Nursing Follow-Up Note    Patient: Rubia Ladd  YOB: 1980  Age: 45 year old  Radiation Oncologist: Dr. Kulwinder Ortiz  Referring Physician: Kulwinder Ortiz  Chief Complaint:   Chief Complaint   Patient presents with    Follow - Up     Date: 4/29/2025    Toxicities: N/A    Vital Signs: BP (!) 140/103 (Patient Position: Sitting, Cuff Size: large)   Pulse 106   Temp 98.7 °F (37.1 °C) (Tympanic)   Resp 18   Wt 83.9 kg (185 lb)   LMP 06/26/2024 (Exact Date)   SpO2 98%   BMI 33.61 kg/m² ,   Wt Readings from Last 6 Encounters:   04/30/25 83.9 kg (185 lb)   03/19/25 86.2 kg (190 lb)   02/26/25 88.5 kg (195 lb)   02/05/25 86.2 kg (190 lb)   01/27/25 87.1 kg (192 lb)   01/06/25 85.9 kg (189 lb 6.4 oz)       Allergies:  Allergies[1]    Nursing Note: Hx of squamous cell carcinoma of cervix. S/p hysterectomy and bilateral salpingectomy on 7/5/24- path: invasive moderately differentiated SCC of cervix. Completed chemoRT to pelvis 10/28/24. Completed brachytherapy at [place]. RT to para-aortic LN completed 1/28/25. PET done 1/31/25. Here for follow up today. Remains on maintenance Keytruda. Pt saw Dr. Licona & Dr. Ornelas 2/25/25. Will see them again in May. Pt has URI since 4/28/25. Reports still has bowel urgency. Denies diarrhea. Has some stool leakage with coughing. Denies any urinary urgency or frequency. Doesn't use vaginal dilator and not sexually active.          [1]   Allergies  Allergen Reactions    Morphine NAUSEA AND VOMITING

## 2025-04-30 NOTE — PROGRESS NOTES
Pt here for Day 1 Maintenance  Drug(s) Keytruda.  Arrives Ambulating independently, accompanied by Self     Patient was evaluated today by MD.    Oral medications included in this regimen:  no    Patient confirms comprehension of cancer treatment schedule:  yes    Pregnancy screening:  Denies possibility of pregnancy    Modifications in dose or schedule:  No    Medications appearance and physical integrity checked by RN: yes.    Chemotherapy IV pump settings verified by 2 RNs:  No due to targeted therapy IV administration.  Frequency of blood return and site check throughout administration: Prior to administration     Infusion/treatment outcome:  patient tolerated treatment without incident    Education Record    Learner:  Patient  Barriers / Limitations:  None  Method:  Discussion  Education / instructions given:  today's regime  Outcome:  Shows understanding    Discharged Home, Ambulating independently, accompanied by:Self    Patient/family verbalized understanding of future appointments: by MyChart messaging

## 2025-04-30 NOTE — PATIENT INSTRUCTIONS
- FOLLOW-UP WITH DR. VALERIA MORALES ONLY IF NEEDED    - CALL THE NURSING LINE AT [phone] IF YOU HAVE ANY QUESTIONS/CONCERNS REGARDING RADIATION THERAPY

## 2025-04-30 NOTE — PROGRESS NOTES
Cancer Center Progress Note    Problem List:      Problem List[1]      History of Present Illness  Rubia Ladd is a 45 year old female with squamous cell carcinoma of the cervix who presents with a cough and congestion.    She has a history of squamous cell carcinoma of the cervix, FIGO stage 2B, and is HPV positive. She is currently on maintenance pembrolizumab after having undergone concurrent chemotherapy and radiation therapy.    She experienced a sudden onset of non-productive cough and congestion two days ago. Her  also recently became ill. She has no fever, sore throat, or body aches, and her appetite remains normal.    Recent blood tests show borderline low platelet levels and a slightly low total white count. Her chemistry panel and thyroid levels are normal, and her blood sugars are slightly elevated. Her lymphocyte count is low due to previous chemotherapy and radiation, but her neutrophil count is normal.    In her social history, she is active in gardening and raising chickens, with a current flock of five chickens and four new chicks.      Review of Systems:   Constitutional: Negative for anorexia, fatigue, fevers, chills, night sweats and weight loss.  Eyes: Negative for visual disturbance, irritation and redness.  Cardiovascular: Negative for angina, orthopnea or palpitations.  Gastrointestinal: Negative for nausea, vomiting, change in bowel habits, diarrhea, constipation and abdominal pain.  Integument/breast: Negative for rash, skin lesions, and pruritus.  Hematologic/lymphatic: Negative for easy bruising, bleeding, and lymphadenopathy.  Musculoskeletal: Negative for myalgias, arthralgias, muscle weakness.  Genitourinary: Negative for dysuria or hematuria  Psychiatric: The patient's mood was calm and appropriate for this visit.  The pertinent positives and negatives were described. All other systems were negative.    PMH/PSH:  Past Medical History[2]    Past Surgical  History[3]    Family History Reviewed:  Family History[4]    Allergies:     Allergies[5]    Medications:  Current Medications[6]       Vital Signs:      Height: --  Weight: 83.9 kg (185 lb) (04/30 0733)  BSA (Calculated - sq m): --  Pulse: 106 (04/30 0749)  BP: 140/103 (04/30 0749)  Temp: 98.7 °F (37.1 °C) (04/30 0749)  Do Not Use - Resp Rate: --  SpO2: 98 % (04/30 0749)      Performance Status:  ECOG 0: Fully active, able to carry on all pre-disease performance without restriction     Physical Examination:      Constitutional: Patient is alert and oriented x 3, not in acute distress.  Respiratory: Clear to auscultation and percussion. No rales.  No wheezes.  Cardiovascular: Regular rate and rhythm. No murmurs.  Gastrointestinal: Soft, non tender with good bowel sounds.  Musculoskeletal: No edema. No calf tenderness.  Lymphatics: There is no palpable lymphadenopathy throughout in the cervical, supraclavicular, or axillary regions.  Psychiatric: The patient's mood is calm and appropriate for this visit.         Results reviewed at this visit:     Lab Results   Component Value Date    WBC 3.1 (L) 04/30/2025    RBC 5.00 04/30/2025    HGB 14.2 04/30/2025    HCT 41.0 04/30/2025    MCV 82.0 04/30/2025    MCH 28.4 04/30/2025    MCHC 34.6 04/30/2025    RDW 13.7 04/30/2025    .0 (L) 04/30/2025     Lab Results   Component Value Date     04/30/2025    K 3.9 04/30/2025     04/30/2025    CO2 26.0 04/30/2025    BUN 9 04/30/2025    CREATSERUM 1.02 04/30/2025     (H) 04/30/2025    CA 9.8 04/30/2025    ALKPHO 66 04/30/2025    ALT 15 04/30/2025    AST 18 04/30/2025    BILT 0.5 04/30/2025    ALB 4.6 04/30/2025    TP 7.2 04/30/2025       Results  LABS  CBC: Leukopenia, Neutrophils normal, Lymphopenia (04/28/2025)  Platelets: Borderline thrombocytopenia, above 100 (04/28/2025)  Chemistry: Within normal limits (04/28/2025)  Blood Glucose: Mild hyperglycemia (04/28/2025)  TSH: Within normal limits  (04/28/2025)  Cortisol: Within normal limits (04/28/2025)         Assessment & Plan  Squamous cell carcinoma of the cervix  HPV+  Clinical T2b staging with deep invasiion of the cervical stroma and parametrial invasion. LVI+    FIGO stage IIB squamous cell carcinoma of the cervix, HPV positive. Currently on maintenance pembrolizumab following concurrent chemotherapy and radiation. Lymphocytes are low post-treatment, but neutrophils are normal, indicating adequate infection defense. Platelets are borderline low but not concerning. No symptoms of cancer progression.  - Continue maintenance pembrolizumab.  - Monitor blood counts, focusing on lymphocytes and platelets.  - Reassess in six weeks with labs and clinical evaluation.    Cough and congestion  Acute onset of cough and congestion since Monday, likely viral in etiology due to recent exposure to 's illness. No fever, sore throat, or other systemic symptoms. Differential includes viral infection versus immune therapy-related inflammation, but presentation is more consistent with viral infection.  - Monitor symptoms over the next week.  - If symptoms worsen or do not improve, order chest X-ray to rule out immune therapy-related inflammation.           The following individual(s) verbally consented to be recorded using ambient AI listening technology and understand that they can each withdraw their consent to this listening technology at any point by asking the clinician to turn off or pause the recording:    Patient name: Rubia REESE Julee Teague MD          [1]   Patient Active Problem List  Diagnosis    History of ovarian cyst    External hemorrhoid    Class 1 obesity due to excess calories without serious comorbidity with body mass index (BMI) of 32.0 to 32.9 in adult    Prediabetes    Elevated LDL cholesterol level    Iron deficiency anemia secondary to blood loss (chronic)    Fibroid, uterine    Uterine bleeding    Iron deficiency anemia  due to chronic blood loss    Squamous cell carcinoma of cervix (HCC)   [2]   Past Medical History:   Anal condyloma    nausea post operatively    Asthma (HCC)    exercise induced as a child-no issues as an adult    Cervical cancer (HCC)    H/O pelvic ultrasound    CONCLUSION:  Stable uterine fibroids, sizable, the largest of which measures up to 7.3 cm.  Measurements given above.  Right ovarian cyst appears simple by ultrasound 5.4 cm maximum dimension.  No pelvic free fluid.    History of urinary reflux    PONV (postoperative nausea and vomiting)    Problems with swallowing    can't swallow pills    Ruptured ovarian cyst    Visual impairment   [3]   Past Surgical History:  Procedure Laterality Date    Hysterectomy  2024    TOTAL ABDOMINAL HYSTERECTOMY, BILATERAL SALPINGECTOMY, INTRAOPERATIVE SURGICAL CONSULT, SIGMOIDOSCOPY      8/3/99    Other surgical history      bladder surgery for reflux    Other surgical history      rupture ovarian cyst with bleeding from fallopian tube-still has both ovaries    Other surgical history  2024    ANAL EXAM UNDER ANESTHESIA, EXCISION OF RECTAL MASS, EXCISION AND FULGURATION OF PERIANAL CONDYLOMA   [4]   Family History  Problem Relation Age of Onset    No Known Problems Father     Other (ovarian cysts) Mother     Alcohol abuse Mother     No Known Problems Daughter     Hypertension Maternal Grandmother     No Known Problems Maternal Grandfather     No Known Problems Paternal Grandmother     No Known Problems Paternal Grandfather     Uterine Cancer Sister         dx age 30s    Pancreatic Cancer Neg     Colon Cancer Neg     Prostate Cancer Neg     Ovarian Cancer Neg     Breast Cancer Neg     Cancer Neg     Endometriosis Neg     Infertility Neg    [5]   Allergies  Allergen Reactions    Morphine NAUSEA AND VOMITING   [6]   No outpatient medications have been marked as taking for the 25 encounter (Office Visit) with Richard Teague MD.

## 2025-04-30 NOTE — PROGRESS NOTES
Patient here for maintenance Keytruda infusion for cervical cancer. Patient states she has a cough d/t recent cold. Patient states that after her last keytruda infusion she experienced a \"deeper\" cough. Patient denies dyspnea, pain or fever. Patient has not had a recent cxr.     Education Record    Learner:  Patient    Disease / Diagnosis: cervical cancer     Barriers / Limitations:  None   Comments:    Method:  Discussion   Comments:    General Topics:  Medication, Side effects and symptom management, and Plan of care reviewed   Comments:    Outcome:  Shows understanding   Comments:

## 2025-05-01 NOTE — PROGRESS NOTES
[facility]    PATIENT'S NAME: DWIGHT MONTERO   RADIATION ONCOLOGIST: Kulwinder Ortiz M.D.   PATIENT ACCOUNT #: [id number] LOCATION: [facility]   [facility]   MEDICAL RECORD #: [id number] YOB: 1980   FOLLOW-UP DATE: 04/30/2025       RADIATION ONCOLOGY FOLLOW-UP NOTE    REFERRING PHYSICIAN:  Gibran Headley MD    DIAGNOSIS:  Squamous cell carcinoma of the cervix, pathologic T2bNX.    REGIONS TREATED:    1.   Pelvis, 5040 cGy, completed 10/28/2024.  2.   Interstitial vaginal brachytherapy, 2150 cGy, completed 11/13/2024.  3.   Periaortic lymph node, 900 cGy, completed 01/28/2025.    INTERVAL SINCE COMPLETION OF RADIATION THERAPY:  Most recently, 3 months.    PATIENT STATUS:  Good recovery from therapy with no evidence of disease.    HISTORY:  The patient is a 45-year-old female who presents today for a followup visit.  She has a history of a pathologic T2bNX squamous cell carcinoma of the cervix.  She had presented with vaginal bleeding with a hemoglobin of 6.3.  She was transfused and started on Provera.  An ultrasound of the pelvis at that time showed some sizable fibroids, and it was felt that her bleeding was a result of those.  She ultimately underwent total abdominal hysterectomy and bilateral salpingectomy on 07/05/2024.  The pathology gave the surprise diagnosis of an invasive, mildly differentiated squamous cell carcinoma of the cervix.  It was noted that the tumor invaded the deep one-third of the cervical stroma with involvement of the right parametrial tissue and focal lymphovascular invasion.  Adnexa was negative as were surgical margins.  There were no lymph nodes sampled.  She ultimately was treated with chemoradiotherapy to the pelvis with a boost to the primary tumor, and she then went to [place] for brachytherapy.  She underwent 5 treatments of high-dose rate vaginal brachytherapy to a total dose of 2150 cGy to the tumor, completing in November 2024.  Later, there had been a  periaortic lymph node originally noted, which was persistent and that area was then treated with an additional 900 cGy in 5 fractions, completing on 01/28/2025.    The patient presents today for her first followup visit since completing her most recent course of radiation.  Overall, she is doing reasonably well.  She denies any particular issues apart from some bowel urgency.  She states that on occasion, she has some urgency, though no incontinence.  Her stool is not watery, and she has never taken any Imodium to help with her symptoms.  She denies any urinary issues or other problems.  She does have an upper respiratory infection currently but states that this was only going on for the past few days.  She continues to follow with Dr. Ornelas and Dr. Licona at [place] and did have a pelvic exam in the last 4 to 6 weeks, which showed no evidence of progression.  She is scheduled to get another exam along with imaging in the next few weeks.    PHYSICAL EXAMINATION:    VITAL SIGNS:  On exam today, the patient is noted to have blood pressure of 140/103, pulse of 106, respiratory rate of 18, and temperature of 98.7.  She has a pain score of 0 and a weight of 185 pounds.  NECK:  Neck is supple with no lymphadenopathy.  CHEST:  Chest is clear to auscultation bilaterally.  HEART:  Cardiac exam showed regular rate and rhythm.  Normal S1, S2, and no audible murmurs.  LYMPHATICS:  No supraclavicular, axillary, inguinal lymphadenopathy.  ABDOMEN:  Benign.  PELVIC:  Pelvic exam was not done on today's examination.    IMPRESSION AND RECOMMENDATIONS:  Overall, the patient is doing rather well.  She does continue to have some bowel urgency.  I have told her that she should try some Imodium to see if that helps with her issue.  She does state that it is getting better over the last month or so.  I told her that if it does not continue to improve over the next 3 to 6 months, it may be reasonable to discuss pelvic floor therapy or  biofeedback techniques.    She continues to follow with numerous physicians for her condition including Dr. Teague, Dr. Ornelas, and Dr. Licona.  In light of the above, I told the patient that she really does not need to continue to see me for routine visits.  She continues to follow with both Dr. Licona and Dr. Ornelas who are doing pelvic exams and ordering her imaging.  I told her that if she should ever have any problems or questions at any point, she should contact my office and I would be happy to see her at that time.  Otherwise, I remain cautiously optimistic and I wished her well.    Thank you very much for allowing me the opportunity to participate in the care of this patient.  If there is questions regarding the radiotherapy, please feel free to contact me at any time.    Dictated By Kulwinder Ortiz M.D.  d: 04/30/2025 08:31:13  t: 04/30/2025 09:54:12  [facility] [id number]  NAD/    cc: SOLO Topete M.D.   Jr. Richard Huber M.D. Elizabeth I Semkiu, D.O.

## 2025-06-11 NOTE — PROGRESS NOTES
Education Record    Learner:  Patient    Disease / Diagnosis: keytruda    Barriers / Limitations:  None   Comments:    Method:  Brief focused   Comments:    General Topics:  Side effects and symptom management   Comments:    Outcome:  Shows understanding   Comments:

## 2025-06-11 NOTE — PROGRESS NOTES
Cancer Center Progress Note    Problem List:      Problem List[1]      History of Present Illness  Rubia Ladd is a 45 year old female with stage 3C squamous cell carcinoma of the cervix who presents for continued treatment.    She has stage 3C squamous cell carcinoma of the cervix with lymph node involvement below the bifurcation of the aorta. She has undergone radiation therapy with concurrent cisplatin and pembrolizumab and is currently on maintenance pembrolizumab. Since her last visit on April 30, 2025, she has followed up with her gynecologic oncologist.    Her recent blood work shows a white blood cell count of 4.4, hemoglobin of 14.2, and platelet count of 147, with a normal white blood cell differential. Her AM cortisol level was 9.6, and thyroid tests were normal.    She experiences changes in bowel movements, describing them as 'thin little worms' with urgency but no diarrhea. These symptoms were less frequent during a recent trip to [facility], where she was more active and consumed less food. She has lost 15 pounds and is back to her normal weight. She suspects lactose intolerance as a potential cause, noting worsened symptoms with increased milk consumption. No blood in her bowel movements and she has not had a colonoscopy.      Review of Systems:   Constitutional: Negative for anorexia, fatigue, fevers, chills, night sweats and weight loss.  Eyes: Negative for visual disturbance, irritation and redness.  Respiratory: Negative for cough, hemoptysis, chest pain, or dyspnea.  Cardiovascular: Negative for angina, orthopnea or palpitations.  Integument/breast: Negative for rash, skin lesions, and pruritus.  Hematologic/lymphatic: Negative for easy bruising, bleeding, and lymphadenopathy.  Musculoskeletal: Negative for myalgias, arthralgias, muscle weakness.  Genitourinary: Negative for dysuria or hematuria  Neurological: Negative for headaches, dizziness, speech problems, gait problems and focal  weakness.  Psychiatric: The patient's mood was calm and appropriate for this visit.  The pertinent positives and negatives were described. All other systems were negative.    PMH/PSH:  Past Medical History[2]    Past Surgical History[3]    Family History Reviewed:  Family History[4]    Allergies:     Allergies[5]    Medications:  Current Medications[6]       Vital Signs:      Height: 158 cm (5' 2.21\") (06/11 0834)  Weight: 82.1 kg (181 lb) (06/11 0834)  BSA (Calculated - sq m): 1.84 sq meters (06/11 0834)  Pulse: 85 (06/11 0834)  BP: 145/86 (06/11 0834)  Temp: 97.7 °F (36.5 °C) (06/11 0834)  Do Not Use - Resp Rate: --  SpO2: 96 % (06/11 0834)      Performance Status:  ECOG 0: Fully active, able to carry on all pre-disease performance without restriction     Physical Examination:      Constitutional: Patient is alert and oriented x 3, not in acute distress.  Respiratory: Clear to auscultation and percussion. No rales.  No wheezes.  Cardiovascular: Regular rate and rhythm. No murmurs.  Gastrointestinal: Soft, non tender with good bowel sounds.  Musculoskeletal: No edema. No calf tenderness.  Neurological: Grossly intact without focal motor or sensory deficit.  Skin: No suspicious skin lesion, no rash, no ulceration.  Lymphatics: There is no palpable lymphadenopathy throughout in the cervical, supraclavicular, or axillary regions.  Psychiatric: The patient's mood is calm and appropriate for this visit.           Results reviewed at this visit:     Lab Results   Component Value Date    WBC 4.4 06/11/2025    RBC 4.98 06/11/2025    HGB 14.2 06/11/2025    HCT 40.3 06/11/2025    MCV 80.9 06/11/2025    MCH 28.5 06/11/2025    MCHC 35.2 06/11/2025    RDW 13.6 06/11/2025    .0 (L) 06/11/2025     Lab Results   Component Value Date     06/11/2025    K 3.7 06/11/2025     06/11/2025    CO2 25.0 06/11/2025    BUN 14 06/11/2025    CREATSERUM 0.92 06/11/2025     (H) 06/11/2025    CA 9.6 06/11/2025    ALKPHO  73 06/11/2025    ALT 17 06/11/2025    AST 18 06/11/2025    BILT 0.4 06/11/2025    ALB 4.5 06/11/2025    TP 7.1 06/11/2025       Results  LABS  CBC: WBC 4.4, Hb 14.2,  (06/11/2025)  Cortisol AM: 9.6 (04/30/2025)  Blood Glucose: 109 (06/11/2025)         Assessment & Plan  Squamous cell carcinoma of the cervix, stage IIIC2  HPV+  Deep invasiion of the cervical stroma and parametrial invasion.   LVI+  Stage IIIC2 squamous cell carcinoma of the cervix with lymph node involvement below the bifurcation of the aorta. She is on maintenance pembrolizumab (Keytruda) following prior radiation and concurrent cisplatin and pembrolizumab. Recent follow-up with gynecologic oncology indicates a well-managed condition. Blood counts and thyroid function tests are within normal limits. A PET scan is due to assess disease status. Keytruda, a PD-L1 inhibitor, allows the immune system to recognize and attack cancer cells. Potential side effects include immune-related reactions such as colitis, but current symptoms do not suggest Keytruda-induced colitis.  - Order PET scan to be completed before the end of July, ideally just before the next appointment on July 23rd, to review results together.  - Continue maintenance pembrolizumab (Keytruda) therapy.  - Monitor blood counts and thyroid function tests regularly.    Bowel movement urgency  Intermittent bowel movement urgency with thin, worm-like stools. No diarrhea or blood in stools. Symptoms may be related to prior pelvic radiation or potential lactose intolerance. Keytruda can cause bowel issues, but symptoms do not align with typical Keytruda-induced colitis. Increased physical activity and dietary changes may be contributing to symptom improvement.  - Consider dietary modifications, including reducing lactose intake or using lactose-free products.  - Continue monitoring symptoms and maintain physical activity.  - Evaluate the need for colonoscopy if symptoms persist or  worsen.           The following individual(s) verbally consented to be recorded using ambient AI listening technology and understand that they can each withdraw their consent to this listening technology at any point by asking the clinician to turn off or pause the recording:    Patient name: Rubia Ladd        Richard Teague MD          [1]   Patient Active Problem List  Diagnosis    History of ovarian cyst    External hemorrhoid    Class 1 obesity due to excess calories without serious comorbidity with body mass index (BMI) of 32.0 to 32.9 in adult    Prediabetes    Elevated LDL cholesterol level    Iron deficiency anemia secondary to blood loss (chronic)    Fibroid, uterine    Uterine bleeding    Iron deficiency anemia due to chronic blood loss    Squamous cell carcinoma of cervix (HCC)   [2]   Past Medical History:   Anal condyloma    nausea post operatively    Asthma (HCC)    exercise induced as a child-no issues as an adult    Cervical cancer (HCC)    H/O pelvic ultrasound    CONCLUSION:  Stable uterine fibroids, sizable, the largest of which measures up to 7.3 cm.  Measurements given above.  Right ovarian cyst appears simple by ultrasound 5.4 cm maximum dimension.  No pelvic free fluid.    History of urinary reflux    PONV (postoperative nausea and vomiting)    Problems with swallowing    can't swallow pills    Ruptured ovarian cyst    Visual impairment   [3]   Past Surgical History:  Procedure Laterality Date    Hysterectomy  2024    TOTAL ABDOMINAL HYSTERECTOMY, BILATERAL SALPINGECTOMY, INTRAOPERATIVE SURGICAL CONSULT, SIGMOIDOSCOPY      8/3/99    Other surgical history      bladder surgery for reflux    Other surgical history      rupture ovarian cyst with bleeding from fallopian tube-still has both ovaries    Other surgical history  2024    ANAL EXAM UNDER ANESTHESIA, EXCISION OF RECTAL MASS, EXCISION AND FULGURATION OF PERIANAL CONDYLOMA   [4]   Family History  Problem Relation  Age of Onset    No Known Problems Father     Other (ovarian cysts) Mother     Alcohol abuse Mother     No Known Problems Daughter     Hypertension Maternal Grandmother     No Known Problems Maternal Grandfather     No Known Problems Paternal Grandmother     No Known Problems Paternal Grandfather     Uterine Cancer Sister         dx age 30s    Pancreatic Cancer Neg     Colon Cancer Neg     Prostate Cancer Neg     Ovarian Cancer Neg     Breast Cancer Neg     Cancer Neg     Endometriosis Neg     Infertility Neg    [5]   Allergies  Allergen Reactions    Morphine NAUSEA AND VOMITING   [6]   No outpatient medications have been marked as taking for the 6/11/25 encounter (Office Visit) with Richard Teague MD.

## 2025-06-11 NOTE — PROGRESS NOTES
Patient here for keytruda infusion and f/u for cervical cancer. Patient denies n/v/d, pain, cough, fever, dyspnea or fatigue. Patient states she has more energy and is increasing her physical activities. Patient has no further concerns or complaints at this time.     Education Record    Learner:  Patient    Disease / Diagnosis: cervical cancer     Barriers / Limitations:  None   Comments:    Method:  Discussion   Comments:    General Topics:  Medication, Side effects and symptom management, and Plan of care reviewed   Comments:    Outcome:  Shows understanding   Comments:

## 2025-07-23 NOTE — PROGRESS NOTES
Pt here for Cmaintenance cycleD1 Drug(s)keytruda.  Arrives Ambulating independently, accompanied by Self     Patient was evaluated today by MD.    Oral medications included in this regimen:  no    Patient confirms comprehension of cancer treatment schedule:  yes    Pregnancy screening:  Denies possibility of pregnancy    Modifications in dose or schedule:  No    Medications appearance and physical integrity checked by RN: yes.    Chemotherapy IV pump settings verified by 2 RNs:  No due to targeted therapy IV administration.  Frequency of blood return and site check throughout administration: Prior to administration     Infusion/treatment outcome:  patient tolerated treatment without incident    Education Record    Learner:  Patient  Barriers / Limitations:  None  Method:  Discussion  Education / instructions given:  AVS  Outcome:  Shows understanding    Discharged Home, Ambulating independently, accompanied by:Self    Patient/family verbalized understanding of future appointments: by [employer] messaging    Here for labs, MD, and keytruda. Tolerated infusion. Back in 6 weeks for next cycle.

## 2025-07-23 NOTE — PROGRESS NOTES
Patient here for Keytruda infusion. Patient states after her last Keytruda infusion she had mild fatigue and a non productive cough for a couple days and then it resolved. Patient denies fever or pain. Patient has a good appetite and adequate fluid intake. Patient completed PET scan yesterday.     Education Record    Learner:  Patient    Disease / Diagnosis: cervical cancer    Barriers / Limitations:  None   Comments:    Method:  Discussion   Comments:    General Topics:  Medication, Side effects and symptom management, and Plan of care reviewed   Comments:    Outcome:  Shows understanding   Comments:

## 2025-07-23 NOTE — PROGRESS NOTES
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    She has been receiving maintenance therapy with pembrolizumab every 42 days since September 2024. She experiences fatigue following each treatment, particularly on Fridays and Saturdays, which she describes as feeling 'bored, tired'.    A recent PET scan conducted yesterday showed persistent uptake in a right paratracheal lymph node, which has decreased in size from 1.0 cm to 0.8 cm, with an SUV reduction from 4.0 to 3.9. There was no abnormal uptake in the abdomen or pelvis, only physiological uptake.    Her complete blood count today showed a white blood cell count of 4.4, hemoglobin of 15.3, and a platelet count of 159. She noted a previous concern about a high red blood cell count, but her hemoglobin is within the normal range for women, and her mean corpuscular volume is 81, which is on the lower end of normal.    No new symptoms such as rash, breathing problems, or diarrhea. She receives a small amount of cortisol with each treatment. Her chemistry panel results are pending.      Review of Systems:   Constitutional: Negative for anorexia, fatigue, fevers, chills, night sweats and weight loss.  Eyes: Negative for visual disturbance, irritation and redness.  Respiratory: Negative for cough, hemoptysis, chest pain, or dyspnea.  Cardiovascular: Negative for angina, orthopnea or palpitations.  Gastrointestinal: Negative for nausea, vomiting, change in bowel habits, diarrhea, constipation and abdominal pain.  Integument/breast: Negative for rash, skin lesions, and pruritus.  Hematologic/lymphatic: Negative for easy bruising, bleeding, and lymphadenopathy.  Musculoskeletal: Negative for myalgias, arthralgias, muscle weakness.  Genitourinary: Negative for dysuria or  hematuria  Neurological: Negative for headaches, dizziness, speech problems, gait problems and focal weakness.  Psychiatric: The patient's mood was calm and appropriate for this visit.  The pertinent positives and negatives were described. All other systems were negative.    PMH/PSH:  Past Medical History[2]    Past Surgical History[3]    Family History Reviewed:  Family History[4]    Allergies:     Allergies[5]    Medications:  Current Medications[6]       Vital Signs:      Height: 158 cm (5' 2.21\") (07/23 0835)  Weight: 81.6 kg (180 lb) (07/23 0835)  BSA (Calculated - sq m): 1.83 sq meters (07/23 0835)  Pulse: 72 (07/23 0835)  BP: 144/87 (07/23 0835)  Temp: 97.7 °F (36.5 °C) (07/23 0835)  Do Not Use - Resp Rate: --  SpO2: 97 % (07/23 0835)      Performance Status:  ECOG 0: Fully active, able to carry on all pre-disease performance without restriction     Physical Examination:      Constitutional: Patient is alert and oriented x 3, not in acute distress.  Eyes: Anicteric sclera, pink conjunctiva.  HEENT:  Oropharynx is clear. Neck is supple.  Respiratory: Clear to auscultation and percussion. No rales.  No wheezes.  Cardiovascular: Regular rate and rhythm. No murmurs.  Gastrointestinal: Soft, non tender with good bowel sounds.  Musculoskeletal: No edema. No calf tenderness.  Skin: No suspicious skin lesion, no rash, no ulceration.  Lymphatics: There is no palpable lymphadenopathy throughout in the cervical, supraclavicular, or axillary regions.  Psychiatric: The patient's mood is calm and appropriate for this visit.         Results reviewed at this visit:     Lab Results   Component Value Date    WBC 4.4 07/23/2025    RBC 5.40 (H) 07/23/2025    HGB 15.3 07/23/2025    HCT 43.9 07/23/2025    MCV 81.3 07/23/2025    MCH 28.3 07/23/2025    MCHC 34.9 07/23/2025    RDW 13.5 07/23/2025    .0 07/23/2025     Lab Results   Component Value Date     07/23/2025    K 3.7 07/23/2025     07/23/2025    CO2 25.0  07/23/2025    BUN 7 (L) 07/23/2025    CREATSERUM 0.90 07/23/2025    GLU 98 07/23/2025    CA 9.6 07/23/2025    ALKPHO 77 07/23/2025    ALT 17 07/23/2025    AST 17 07/23/2025    BILT 0.5 07/23/2025    ALB 4.5 07/23/2025    TP 7.2 07/23/2025       Results  LABS    CBC: WBC 4.4, Hb 15.3, , MCV 81. (07/23/2025)    RADIOLOGY    PET scan: Persistent uptake in right paratracheal node, SUV 3.9, size 0.8 cm. Physiologic uptake in abdomen and pelvis, no abnormal uptake. (07/22/2025)    FINDINGS:    MEAN SUV MEDIASTINUM: 2.62    MEAN SUV RIGHT HEPATIC LOBE: 3.09    Standard uptake values reported below are the maximum for the lesion unless otherwise specified.    HEAD AND NECK: There is physiologic uptake in parapharyngeal tonsillar tissue.    CHEST: Persistent uptake involves a high right paratracheal node which measures 0.8 versus 1.0 cm on 1/31/2025 PET with a maximum SUV of 3.9 versus 4.    ABDOMEN: There is physiologic uptake in kidneys, bowel and bladder.    PELVIS: There is physiologic uptake in bowel and bladder.    SPINE AND EXTREMITIES: There is physiologic uptake in skeletal structures. Mild uptake, maximum SUV 3.3 adjacent to the greater trochanter is most likely represent greater trochanteric bursitis, correlate clinically.    CT FINDINGS: Uptake within the lower esophagus may be due to gastroesophageal reflux, correlate clinically.    Impression   CONCLUSION:    When compared to 1/31/2025 PET, right Port-A-Cath has been removed. There is persistent uptake involving a right high paratracheal node which is slightly decreased in size, correlate clinically.           Assessment & Plan  Squamous cell carcinoma of the cervix, stage IIIC2  HPV+  Deep invasiion of the cervical stroma and parametrial invasion.   LVI+  S/P cisplatin/RT and pembrolizumab (treatment started 9/17/2024)  Persistent uptake in the right paratracheal node with slight decrease in size and SUV. No new abdominal or pelvic abnormalities. Good  tolerance to pembrolizumab with mild fatigue post-treatment. Normal hemoglobin levels indicating well-managed anemia.  - Continue pembrolizumab maintenance therapy every six weeks. She has no evidence of immune toxicity  - Repeat PET scan in January 2026.  - Schedule follow-up appointments with Dr. Licona and Dr. Ornelas in three months.  - Monitor CBC and chemistry panel for potential late toxicity from pembrolizumab, focusing on liver enzymes and kidney function.  - Return in 6 weeks with repeat labs and continued treatment.           The following individual(s) verbally consented to be recorded using ambient AI listening technology and understand that they can each withdraw their consent to this listening technology at any point by asking the clinician to turn off or pause the recording:    Patient name: Rubia REESE Julee Teague MD            [1]   Patient Active Problem List  Diagnosis    History of ovarian cyst    External hemorrhoid    Class 1 obesity due to excess calories without serious comorbidity with body mass index (BMI) of 32.0 to 32.9 in adult    Prediabetes    Elevated LDL cholesterol level    Iron deficiency anemia secondary to blood loss (chronic)    Fibroid, uterine    Uterine bleeding    Iron deficiency anemia due to chronic blood loss    Squamous cell carcinoma of cervix (HCC)   [2]   Past Medical History:   Anal condyloma    nausea post operatively    Asthma (HCC)    exercise induced as a child-no issues as an adult    Cervical cancer (HCC)    H/O pelvic ultrasound    CONCLUSION:  Stable uterine fibroids, sizable, the largest of which measures up to 7.3 cm.  Measurements given above.  Right ovarian cyst appears simple by ultrasound 5.4 cm maximum dimension.  No pelvic free fluid.    History of urinary reflux    PONV (postoperative nausea and vomiting)    Problems with swallowing    can't swallow pills    Ruptured ovarian cyst    Visual impairment   [3]   Past Surgical  History:  Procedure Laterality Date    Hysterectomy  2024    TOTAL ABDOMINAL HYSTERECTOMY, BILATERAL SALPINGECTOMY, INTRAOPERATIVE SURGICAL CONSULT, SIGMOIDOSCOPY      8/3/99    Other surgical history      bladder surgery for reflux    Other surgical history      rupture ovarian cyst with bleeding from fallopian tube-still has both ovaries    Other surgical history  2024    ANAL EXAM UNDER ANESTHESIA, EXCISION OF RECTAL MASS, EXCISION AND FULGURATION OF PERIANAL CONDYLOMA   [4]   Family History  Problem Relation Age of Onset    No Known Problems Father     Other (ovarian cysts) Mother     Alcohol abuse Mother     No Known Problems Daughter     Hypertension Maternal Grandmother     No Known Problems Maternal Grandfather     No Known Problems Paternal Grandmother     No Known Problems Paternal Grandfather     Uterine Cancer Sister         dx age 30s    Pancreatic Cancer Neg     Colon Cancer Neg     Prostate Cancer Neg     Ovarian Cancer Neg     Breast Cancer Neg     Cancer Neg     Endometriosis Neg     Infertility Neg    [5]   Allergies  Allergen Reactions    Morphine NAUSEA AND VOMITING   [6]   No outpatient medications have been marked as taking for the 25 encounter (Office Visit) with Richard Teague MD.

## (undated) NOTE — LETTER
24    Patient: Rubia Ladd  : 3/29/1980 Visit date: 2024    Dear  Christina Loredo,     Thank you for referring Rubia Ladd to my practice.  Please find my assessment and plan below.     Assessment   1. Rectal mass    2. Anal condyloma        This is a very nice 44-year-old female who presents to me with concern for a \"hemorrhoid\".  She has noticed a mass of tissue outside of her anus for the last 5 years.  She calls it a \"super huge hemorrhoid\".  She has a small amount of bleeding with bowel movements and cleaning at times.  It does not cause her any pain.  She does see some mucus discharge at times.  She states it is always outside of her anus and pops right back out if she tries to reduce the tissue.  She denies any itching.  She has had 1 vaginal delivery with episiotomy.  She denies any fecal incontinence.  No prior anorectal surgery.  No blood thinners.  No prior colonoscopy.  No family history of colon or rectal cancer.  She has not sought medical care for many years due to lack of health insurance until recently.  No known history of sexually transmitted infections.  No history of chronic constipation and she occasionally takes fiber Gummies.    External exam of the anus reveals a 1-2 cm prolapsing condylomatous mass in the right anterior anal canal.  This mass is semipedunculated, friable friable and bleeds on contact.  There is a small approximately 5 mm condylomatous lesion just outside the posterior vagina and the perineum.  Digital rectal examination reveals fair tone without any more proximal palpable masses or lesions.  Anoscopy reveals the presence of the semipedunculated condylomatous lesion in the distal anal canal without any other obvious more proximal masses or lesions.    Plan  I counseled patient that I suspect she has a large, prolapsing anal condyloma and at least 1 additional small perineal condyloma.  It is also possible she could have underlying anal  dysplasia or even cancer though I think this is less likely.  I do recommend plan to go to the operating room for anal exam under anesthesia with anoscopy and excision of the rectal mass in addition to excision and fulguration of any additional perianal condyloma.  The details of this procedure were discussed including the expected recovery time, risks, benefits and alternatives.  Specifically, I counseled patient that condyloma is caused by human papilloma virus (HPV).  Unfortunately, surgery does not cure HPV.  Therefore, she is at risk for recurrent condyloma or even anal dysplasia.  She may require further surveillance procedures or work-up depending on pathology of the rectal mass.  I do recommend she establishes care with a gynecologist for vaginal and cervical examination.  I also advised her to use barrier protection with intercourse.      Patient expressed understanding and was agreeable to schedule surgery with me.  This has been scheduled for 5/24/2024.      Sincerely,       Lars Olvera MD   CC:   No Recipients

## (undated) NOTE — LETTER
Date & Time: 5/31/2024, 4:37 PM  Patient: Rubia Ladd  Encounter Provider(s):    Kavitha Guerra APRN         This certifies that I, Rubia Ladd, a patient at an [facility], am leaving the facility voluntarily and against the advice of my physician.    I acknowledge that I have been:    1. Recommended ambulance transfer.    I hereby release my physician, the facility, and its employees from all responsibility for any ill effects which may result from this action.        __________________________________  Patient or authorized caregiver signature    __________________________________  RN signature    If no patient or patient representative signature was obtained, sign below to acknowledge that the form was reviewed with the patient and that the patient refused to sign.    __________________________________  RN signature

## (undated) NOTE — LETTER
Patient Name: Rubia Ladd  -Age / Sex: 3/29/1980-A: 44 y  female   Medical Records: [id number] CSN: [id number]    ABNORMAL VALUES  Surgeon(s):  Tiny Zee DO  Anesthesia Type: General  Date of Surgery: 2024  Procedure Description: TOTAL ABDOMINAL HYSTERECTOMY, BILATERAL SALPINGECTOMY  Primary Surgeon:  Tiny Zee DO  Phone Number: [phone]    PLEASE NOTE THE FOLLOWING ABNORMALITIES:   Hematology  24- hgb=7.3  ________________________________________________________  Anesthesia to review patient's chart

## (undated) NOTE — LETTER
Date & Time: 3/7/2024, 4:31 PM  Patient: Rubia Ladd  Encounter Provider(s):    Bernabe Gillette MD       To Whom It May Concern:    Rubia Ladd was seen and treated in our department on 3/7/2024. She can return to work with these limitations: Limited use of the left hand .    If you have any questions or concerns, please do not hesitate to call.        _____________________________  Physician/APC Signature